# Patient Record
Sex: FEMALE | Race: WHITE | NOT HISPANIC OR LATINO | Employment: PART TIME | URBAN - METROPOLITAN AREA
[De-identification: names, ages, dates, MRNs, and addresses within clinical notes are randomized per-mention and may not be internally consistent; named-entity substitution may affect disease eponyms.]

---
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
## 2019-03-10 ENCOUNTER — HOSPITAL ENCOUNTER (EMERGENCY)
Facility: HOSPITAL | Age: 19
Discharge: HOME | End: 2019-03-10
Attending: EMERGENCY MEDICINE | Admitting: EMERGENCY MEDICINE
Payer: COMMERCIAL

## 2019-03-10 VITALS
RESPIRATION RATE: 18 BRPM | DIASTOLIC BLOOD PRESSURE: 88 MMHG | OXYGEN SATURATION: 97 % | HEART RATE: 89 BPM | TEMPERATURE: 98.4 F | BODY MASS INDEX: 22.13 KG/M2 | WEIGHT: 141 LBS | SYSTOLIC BLOOD PRESSURE: 130 MMHG | HEIGHT: 67 IN

## 2019-03-10 DIAGNOSIS — R21 RASH: Primary | ICD-10-CM

## 2019-03-10 PROCEDURE — 99282 EMERGENCY DEPT VISIT SF MDM: CPT

## 2019-03-10 RX ORDER — FAMOTIDINE 20 MG/1
20 TABLET, FILM COATED ORAL 2 TIMES DAILY
Qty: 10 TABLET | Refills: 0 | Status: SHIPPED | OUTPATIENT
Start: 2019-03-10 | End: 2019-03-10 | Stop reason: SDUPTHER

## 2019-03-10 RX ORDER — PREDNISONE 20 MG/1
60 TABLET ORAL ONCE
Status: COMPLETED | OUTPATIENT
Start: 2019-03-10 | End: 2019-03-10

## 2019-03-10 RX ORDER — PREDNISONE 20 MG/1
40 TABLET ORAL DAILY
Qty: 10 TABLET | Refills: 0 | Status: SHIPPED | OUTPATIENT
Start: 2019-03-10 | End: 2019-03-10 | Stop reason: SDUPTHER

## 2019-03-10 RX ORDER — PREDNISONE 20 MG/1
40 TABLET ORAL DAILY
Qty: 10 TABLET | Refills: 0 | Status: SHIPPED | OUTPATIENT
Start: 2019-03-10 | End: 2019-03-15

## 2019-03-10 RX ORDER — FAMOTIDINE 20 MG/1
20 TABLET, FILM COATED ORAL 2 TIMES DAILY
Qty: 10 TABLET | Refills: 0 | Status: SHIPPED | OUTPATIENT
Start: 2019-03-10 | End: 2019-12-10 | Stop reason: ALTCHOICE

## 2019-03-10 RX ORDER — FAMOTIDINE 20 MG/1
20 TABLET, FILM COATED ORAL ONCE
Status: COMPLETED | OUTPATIENT
Start: 2019-03-10 | End: 2019-03-10

## 2019-03-10 RX ADMIN — FAMOTIDINE 20 MG: 20 TABLET ORAL at 18:16

## 2019-03-10 RX ADMIN — PREDNISONE 60 MG: 20 TABLET ORAL at 18:16

## 2019-03-10 NOTE — DISCHARGE INSTRUCTIONS
Discontinue use of use new medication as an outpatient  Use Benadryl 25 mg every 6 hours as needed for itching  Return to ED if any change or worsening condition, difficulty breathing, shortness of breath or lip or tongue swelling

## 2019-03-10 NOTE — ED TRIAGE NOTES
Patient arrives with complaints of redness to face for the past 3 days with itching  Does state tongue was slightly swollen yesterday but diminished after benadryl  Did start Bactroban topically 4 days ago from lesion behind ear

## 2019-03-10 NOTE — ED PROVIDER NOTES
History  Chief Complaint   Patient presents with    Rash     30-year-old female with history of hyperlipidemia presents for evaluation of rash starting 1 day prior  Patient reports that she initiated use of Bactroban 1 day prior for skin infection and started having rash on face and neck shortly after  Patient with itching in the face at this time and reports swelling of tongue 1 day prior  Patient with no swelling, stridor or wheezing at this time but does continue to report itching  Patient took Benadryl at home with little relief  Otherwise patient no other new exposures, new foods, new detergents or perfumes  History provided by:  Patient   used: No    Rash   Associated symptoms: no abdominal pain, no diarrhea, no fever, no headaches, no myalgias, no nausea, no shortness of breath, no sore throat and not vomiting        Prior to Admission Medications   Prescriptions Last Dose Informant Patient Reported? Taking?    EPINEPHrine (EPIPEN) 0 3 mg/0 3 mL SOAJ   Yes No   Sig: Inject 0 3 mg into a muscle   fexofenadine (ALLEGRA) 180 MG tablet   Yes No   Sig: Take 180 mg by mouth   fluticasone (FLONASE) 50 mcg/act nasal spray   Yes No   Si sprays into each nostril   norgestimate-ethinyl estradiol (ORTHO TRI-CYCLEN,TRINESSA) 0 18/0 215/0 25 MG-35 MCG per tablet   No No   Sig: Take 1 tablet by mouth daily   pediatric multivitamin-iron (POLY-VI-SOL with IRON) 15 MG chewable tablet   Yes No   Sig: Chew 1 tablet      Facility-Administered Medications: None       Past Medical History:   Diagnosis Date    High cholesterol        Past Surgical History:   Procedure Laterality Date    TONSILECTOMY AND ADNOIDECTOMY      TYMPANOSTOMY TUBE PLACEMENT      WISDOM TOOTH EXTRACTION         Family History   Problem Relation Age of Onset    Fibroids Mother     GI problems Father     Seizures Sister     Arthritis Maternal Grandmother      I have reviewed and agree with the history as documented  Social History     Tobacco Use    Smoking status: Never Smoker    Smokeless tobacco: Never Used   Substance Use Topics    Alcohol use: Yes     Comment: socially     Drug use: No        Review of Systems   Constitutional: Negative for chills and fever  HENT: Negative for rhinorrhea and sore throat  Eyes: Negative for visual disturbance  Respiratory: Negative for cough and shortness of breath  Cardiovascular: Negative for chest pain and leg swelling  Gastrointestinal: Negative for abdominal pain, diarrhea, nausea and vomiting  Genitourinary: Negative for dysuria  Musculoskeletal: Negative for back pain and myalgias  Skin: Positive for rash  Neurological: Negative for dizziness and headaches  Psychiatric/Behavioral: Negative for confusion  All other systems reviewed and are negative  Physical Exam  Physical Exam   Constitutional: She is oriented to person, place, and time  She appears well-developed and well-nourished  HENT:   Nose: Nose normal    Mouth/Throat: Oropharynx is clear and moist  No oropharyngeal exudate  Eyes: Pupils are equal, round, and reactive to light  Conjunctivae and EOM are normal  No scleral icterus  Neck: Normal range of motion  Neck supple  No JVD present  No tracheal deviation present  Cardiovascular: Normal rate, regular rhythm and normal heart sounds  No murmur heard  Pulmonary/Chest: Effort normal and breath sounds normal  No respiratory distress  She has no wheezes  She has no rales  Abdominal: Soft  Bowel sounds are normal  There is no tenderness  There is no guarding  Musculoskeletal: Normal range of motion  She exhibits no edema or tenderness  Neurological: She is alert and oriented to person, place, and time  No cranial nerve deficit or sensory deficit  She exhibits normal muscle tone  5/5 motor, nl sens   Skin: Skin is warm and dry  Very mild erythematous rash on the face and neck     Psychiatric: She has a normal mood and affect  Her behavior is normal    Nursing note and vitals reviewed  Vital Signs  ED Triage Vitals [03/10/19 1800]   Temperature Pulse Respirations Blood Pressure SpO2   98 4 °F (36 9 °C) 89 18 130/88 97 %      Temp Source Heart Rate Source Patient Position - Orthostatic VS BP Location FiO2 (%)   Tympanic Monitor Sitting Left arm --      Pain Score       --           Vitals:    03/10/19 1800   BP: 130/88   Pulse: 89   Patient Position - Orthostatic VS: Sitting       Visual Acuity      ED Medications  Medications   predniSONE tablet 60 mg (60 mg Oral Given 3/10/19 1816)   famotidine (PEPCID) tablet 20 mg (20 mg Oral Given 3/10/19 1816)       Diagnostic Studies  Results Reviewed     None                 No orders to display              Procedures  Procedures       Phone Contacts  ED Phone Contact    ED Course  ED Course as of Mar 10 1819   Galo Rhodes Mar 10, 2019   1757 Patient seen and examined at bedside  Patient given dose of prednisone and Pepcid in ED  Patient took Benadryl prior to ED arrival       1810 Discussed with patient discharge plan and instructions  Patient to follow up with primary care physician as an outpatient  Patient to return to ED if any change or worsening condition: increased pain, difficulty breathing, shortness of breath, wheezing or tongue or lip swelling  MDM    Disposition  Final diagnoses:   Rash     Time reflects when diagnosis was documented in both MDM as applicable and the Disposition within this note     Time User Action Codes Description Comment    3/10/2019  6:08 PM Arturo Carpenter Add [R21] Rash       ED Disposition     ED Disposition Condition Date/Time Comment    Discharge Stable Sun Mar 10, 2019  6:08 PM Octavio Salgado discharge to home/self care              Follow-up Information     Follow up With Specialties Details Why Zeke Cisneros MD Family Medicine In 2 days As needed, If symptoms worsen [address]  [address] [facility] [address] [facility] Emergency Department Emergency Medicine  As needed, If symptoms worsen [address] [facility]  [phone]          Discharge Medication List as of 3/10/2019  6:10 PM      START taking these medications    Details   famotidine (PEPCID) 20 mg tablet Take 1 tablet (20 mg total) by mouth 2 (two) times a day, Starting Sun 3/10/2019, Normal      predniSONE 20 mg tablet Take 2 tablets (40 mg total) by mouth daily for 5 days, Starting Sun 3/10/2019, Until Fri 3/15/2019, Normal         CONTINUE these medications which have NOT CHANGED    Details   EPINEPHrine (EPIPEN) 0 3 mg/0 3 mL SOAJ Inject 0 3 mg into a muscle, Historical Med      fexofenadine (ALLEGRA) 180 MG tablet Take 180 mg by mouth, Historical Med      fluticasone (FLONASE) 50 mcg/act nasal spray 2 sprays into each nostril, Starting Tue 10/9/2018, Until Wed 10/9/2019, Historical Med      norgestimate-ethinyl estradiol (ORTHO TRI-CYCLEN,TRINESSA) 0 18/0 215/0 25 MG-35 MCG per tablet Take 1 tablet by mouth daily, Starting Wed 12/19/2018, Until Thu 12/19/2019, Normal      pediatric multivitamin-iron (POLY-VI-SOL with IRON) 15 MG chewable tablet Chew 1 tablet, Historical Med           No discharge procedures on file      ED Provider  Electronically Signed by           Tk Khan, DO  03/10/19 [address] [employer] [facility], [facility]  03/10/19 8318

## 2019-09-07 ENCOUNTER — APPOINTMENT (OUTPATIENT)
Dept: URGENT CARE | Facility: CLINIC | Age: 19
End: 2019-09-07

## 2019-09-07 DIAGNOSIS — Z02.1 PRE-EMPLOYMENT EXAMINATION: Primary | ICD-10-CM

## 2019-09-07 PROCEDURE — 86480 TB TEST CELL IMMUN MEASURE: CPT | Performed by: NURSE PRACTITIONER

## 2019-09-11 LAB
GAMMA INTERFERON BACKGROUND BLD IA-ACNC: 0.05 IU/ML
M TB IFN-G BLD-IMP: NEGATIVE
M TB IFN-G CD4+ BCKGRND COR BLD-ACNC: -0.01 IU/ML
M TB IFN-G CD4+ BCKGRND COR BLD-ACNC: 0 IU/ML
MITOGEN IGNF BCKGRD COR BLD-ACNC: >10 IU/ML

## 2019-10-20 ENCOUNTER — OFFICE VISIT (OUTPATIENT)
Dept: URGENT CARE | Facility: HOSPITAL | Age: 19
End: 2019-10-20
Payer: COMMERCIAL

## 2019-10-20 VITALS
SYSTOLIC BLOOD PRESSURE: 118 MMHG | HEIGHT: 67 IN | OXYGEN SATURATION: 95 % | TEMPERATURE: 98.6 F | HEART RATE: 89 BPM | BODY MASS INDEX: 22.08 KG/M2 | RESPIRATION RATE: 18 BRPM | WEIGHT: 140.65 LBS | DIASTOLIC BLOOD PRESSURE: 68 MMHG

## 2019-10-20 DIAGNOSIS — R11.0 NAUSEA: ICD-10-CM

## 2019-10-20 DIAGNOSIS — J02.9 SORE THROAT: Primary | ICD-10-CM

## 2019-10-20 LAB — S PYO AG THROAT QL: NEGATIVE

## 2019-10-20 PROCEDURE — 99213 OFFICE O/P EST LOW 20 MIN: CPT | Performed by: NURSE PRACTITIONER

## 2019-10-20 PROCEDURE — 87070 CULTURE OTHR SPECIMN AEROBIC: CPT | Performed by: NURSE PRACTITIONER

## 2019-10-20 PROCEDURE — 87880 STREP A ASSAY W/OPTIC: CPT | Performed by: NURSE PRACTITIONER

## 2019-10-20 RX ORDER — ONDANSETRON 4 MG/1
4 TABLET, FILM COATED ORAL EVERY 8 HOURS PRN
Qty: 20 TABLET | Refills: 0 | Status: SHIPPED | OUTPATIENT
Start: 2019-10-20 | End: 2019-12-10 | Stop reason: ALTCHOICE

## 2019-10-20 NOTE — PROGRESS NOTES
[facility]        NAME: Jack Kearns is a 22 y o  female  : 2000    MRN: [id number]  DATE: 2019  TIME: 2:40 PM    Assessment and Plan   Sore throat [J02 9]  1  Sore throat  POCT rapid strepA    Throat culture   2  Nausea  ondansetron (ZOFRAN) 4 mg tablet         Patient Instructions     Patient Instructions     Take medication as needed for nausea and vomiting  If he develops any abdominal pain prolonged vomiting or vomiting blood, fever or any concerning symptoms proceed to ER  Explained viral vs bacterial  Supportive treatment  Drink plenty of fluids, rest, saltwater gargles, lozenges, hot tea with honey  Tylenol or motrin for pain  Return precautions discussed  Acute Nausea and Vomiting   WHAT YOU NEED TO KNOW:   Acute nausea and vomiting start suddenly, worsen quickly, and last a short time  DISCHARGE INSTRUCTIONS:   Return to the emergency department if:   · You see blood in your vomit or your bowel movements  · You have sudden, severe pain in your chest and upper abdomen after hard vomiting or retching  · You have swelling in your neck and chest      · You are dizzy, cold, and thirsty and your eyes and mouth are dry  · You are urinating very little or not at all  · You have muscle weakness, leg cramps, and trouble breathing  · Your heart is beating much faster than normal      · You continue to vomit for more than 48 hours  Contact your healthcare provider if:   · You have frequent dry heaves (vomiting but nothing comes out)  · Your nausea and vomiting does not get better or go away after you use medicine  · You have questions or concerns about your condition or treatment  Medicines: You may need any of the following:  · Medicines  may be given to calm your stomach and stop your vomiting  You may also need medicines to help you feel more relaxed or to stop nausea and vomiting caused by motion sickness      · Gastrointestinal stimulants are used to help empty your stomach and bowels  This may help decrease nausea and vomiting  · Take your medicine as directed  Contact your healthcare provider if you think your medicine is not helping or if you have side effects  Tell him or her if you are allergic to any medicine  Keep a list of the medicines, vitamins, and herbs you take  Include the amounts, and when and why you take them  Bring the list or the pill bottles to follow-up visits  Carry your medicine list with you in case of an emergency  Prevent or manage acute nausea and vomiting:   · Do not drink alcohol  Alcohol may upset or irritate your stomach  Too much alcohol can also cause acute nausea and vomiting  · Control stress  Headaches due to stress may cause nausea and vomiting  Find ways to relax and manage your stress  Get more rest and sleep  · Drink more liquids as directed  Vomiting can lead to dehydration  It is important to drink more liquids to help replace lost body fluids  Ask your healthcare provider how much liquid to drink each day and which liquids are best for you  Your provider may recommend that you drink an oral rehydration solution (ORS)  ORS contains water, salts, and sugar that are needed to replace the lost body fluids  Ask what kind of ORS to use, how much to drink, and where to get it  · Eat smaller meals, more often  Eat small amounts of food every 2 to 3 hours, even if you are not hungry  Food in your stomach may decrease your nausea  · Talk to your healthcare provider before you take over-the-counter (OTC) medicines  These medicines can cause serious problems if you use certain other medicines, or you have a medical condition  You may have problems if you use too much or use them for longer than the label says  Follow directions on the label carefully  Follow up with your healthcare provider as directed:  Write down your questions so you remember to ask them during your follow-up visits    © 2017 [address] [facility] Information is for End User's use only and may not be sold, redistributed or otherwise used for commercial purposes  All illustrations and images included in CareNotes® are the copyrighted property of [employer]  or [employer]  The above information is an  only  It is not intended as medical advice for individual conditions or treatments  Talk to your doctor, nurse or pharmacist before following any medical regimen to see if it is safe and effective for you  Pharyngitis   WHAT YOU NEED TO KNOW:   Pharyngitis, or sore throat, is inflammation of the tissues and structures in your pharynx (throat)  Pharyngitis is most often caused by bacteria  It may also be caused by a cold or flu virus  Other causes include smoking, allergies, or acid reflux  DISCHARGE INSTRUCTIONS:   Call 911 for any of the following:   · You have trouble breathing or swallowing because your throat is swollen or sore  Return to the emergency department if:   · You are drooling because it hurts too much to swallow  · Your fever is higher than 102? F (39?C) or lasts longer than 3 days  · You are confused  · You taste blood in your throat  Contact your healthcare provider if:   · Your throat pain gets worse  · You have a painful lump in your throat that does not go away after 5 days  · Your symptoms do not improve after 5 days  · You have questions or concerns about your condition or care  Medicines:  Viral pharyngitis will go away on its own without treatment  Your sore throat should start to feel better in 3 to 5 days for both viral and bacterial infections  You may need any of the following:  · Antibiotics  treat a bacterial infection  · NSAIDs , such as ibuprofen, help decrease swelling, pain, and fever  NSAIDs can cause stomach bleeding or kidney problems in certain people   If you take blood thinner medicine, always ask your healthcare provider if NSAIDs are safe for you  Always read the medicine label and follow directions  · Acetaminophen  decreases pain and fever  It is available without a doctor's order  Ask how much to take and how often to take it  Follow directions  Acetaminophen can cause liver damage if not taken correctly  · Take your medicine as directed  Contact your healthcare provider if you think your medicine is not helping or if you have side effects  Tell him or her if you are allergic to any medicine  Keep a list of the medicines, vitamins, and herbs you take  Include the amounts, and when and why you take them  Bring the list or the pill bottles to follow-up visits  Carry your medicine list with you in case of an emergency  Manage your symptoms:   · Gargle salt water  Mix ¼ teaspoon salt in an 8 ounce glass of warm water and gargle  This may help decrease swelling in your throat  · Drink liquids as directed  You may need to drink more liquids than usual  Liquids may help soothe your throat and prevent dehydration  Ask how much liquid to drink each day and which liquids are best for you  · Use a cool-steam humidifier  to help moisten the air in your room and calm your cough  · Soothe your throat  with cough drops, ice, soft foods, or popsicles  Prevent the spread of pharyngitis:  Cover your mouth and nose when you cough or sneeze  Do not share food or drinks  Wash your hands often  Use soap and water  If soap and water are unavailable, use an alcohol based hand   Follow up with your healthcare provider as directed:  Write down your questions so you remember to ask them during your visits  © 2017 [facility] Information is for End User's use only and may not be sold, redistributed or otherwise used for commercial purposes  All illustrations and images included in CareNotes® are the copyrighted property of [employer] , [facility]  or [employer]  The above information is an  only   It is not intended as medical advice for individual conditions or treatments  Talk to your doctor, nurse or pharmacist before following any medical regimen to see if it is safe and effective for you  Follow up with PCP in 3-5 days  Proceed to  ER if symptoms worsen  Chief Complaint     Chief Complaint   Patient presents with    Vomiting     started today     Diarrhea    Sore Throat    Earache         History of Present Illness       Patient is a 67-year-old female presents with a couple hour history of sore throat, nausea, vomiting and diarrhea  Patient states that she called out of work today and needs a work note  Patient denies any difficulty swallowing, breathing or maintaining secretions  Patient denies any abdominal pain  Notes a few episodes of vomiting and diarrhea  Denies any blood in vomit or stool  Denies any urinary complaints  Patient denies any fever, chills, or body aches  Denies any earache nasal congestion cough, chest pain, shortness of breath or palpitations  Patient did not try any over-the-counter medication prior to arrival   Patient denies any recent sick contacts  Denies eating any undercooked food yesterday  Review of Systems   Review of Systems   Constitutional: Negative for chills, diaphoresis, fatigue and fever  HENT: Positive for sore throat  Negative for congestion, ear pain, facial swelling, mouth sores, postnasal drip, rhinorrhea, sinus pressure, sinus pain and trouble swallowing  Eyes: Negative for photophobia and visual disturbance  Respiratory: Negative for cough, chest tightness and shortness of breath  Cardiovascular: Negative for chest pain and palpitations  Gastrointestinal: Positive for diarrhea, nausea and vomiting  Negative for abdominal pain  Genitourinary: Negative  Musculoskeletal: Negative for arthralgias, back pain, joint swelling, myalgias, neck pain and neck stiffness  Skin: Negative for rash     Neurological: Negative for dizziness, facial asymmetry, weakness, light-headedness, numbness and headaches  Current Medications       Current Outpatient Medications:     EPINEPHrine (EPIPEN) 0 3 mg/0 3 mL SOAJ, Inject 0 3 mg into a muscle, Disp: , Rfl:     famotidine (PEPCID) 20 mg tablet, Take 1 tablet (20 mg total) by mouth 2 (two) times a day, Disp: 10 tablet, Rfl: 0    fexofenadine (ALLEGRA) 180 MG tablet, Take 180 mg by mouth, Disp: , Rfl:     fluticasone (FLONASE) 50 mcg/act nasal spray, 2 sprays into each nostril, Disp: , Rfl:     norgestimate-ethinyl estradiol (ORTHO TRI-CYCLEN,TRINESSA) 0 18/0 215/0 25 MG-35 MCG per tablet, Take 1 tablet by mouth daily, Disp: 90 tablet, Rfl: 3    pediatric multivitamin-iron (POLY-VI-SOL with IRON) 15 MG chewable tablet, Chew 1 tablet, Disp: , Rfl:     ondansetron (ZOFRAN) 4 mg tablet, Take 1 tablet (4 mg total) by mouth every 8 (eight) hours as needed for nausea or vomiting, Disp: 20 tablet, Rfl: 0    Current Allergies     Allergies as of 10/20/2019 - Reviewed 10/20/2019   Allergen Reaction Noted    Mupirocin  03/18/2019    Shellfish-derived products Rash 03/10/2019    Sulfa antibiotics Rash 12/19/2018            The following portions of the patient's history were reviewed and updated as appropriate: allergies, current medications, past family history, past medical history, past social history, past surgical history and problem list      Past Medical History:   Diagnosis Date    High cholesterol        Past Surgical History:   Procedure Laterality Date    TONSILECTOMY AND ADNOIDECTOMY      TYMPANOSTOMY TUBE PLACEMENT      WISDOM TOOTH EXTRACTION         Family History   Problem Relation Age of Onset    Fibroids Mother     GI problems Father     Seizures Sister     Arthritis Maternal Grandmother          Medications have been verified          Objective   /68   Pulse 89   Temp 98 6 °F (37 °C) (Oral)   Resp 18   Ht 5' 7" (1 702 m)   Wt 63 8 kg (140 lb 10 5 oz) LMP 10/18/2019 (Exact Date)   SpO2 95%   BMI 22 03 kg/m²        Physical Exam     Physical Exam   Constitutional: She is oriented to person, place, and time  She appears well-developed and well-nourished  No distress  HENT:   Head: Normocephalic and atraumatic  Right Ear: Hearing, tympanic membrane, external ear and ear canal normal    Left Ear: Hearing, tympanic membrane, external ear and ear canal normal    Nose: Nose normal  Right sinus exhibits no maxillary sinus tenderness and no frontal sinus tenderness  Left sinus exhibits no maxillary sinus tenderness and no frontal sinus tenderness  Mouth/Throat: Uvula is midline and mucous membranes are normal  Posterior oropharyngeal erythema present  No oropharyngeal exudate, posterior oropharyngeal edema or tonsillar abscesses  Tonsils are 0 on the right  Tonsils are 0 on the left  Eyes: Pupils are equal, round, and reactive to light  Conjunctivae are normal    Cardiovascular: Regular rhythm, S1 normal, S2 normal, normal heart sounds, intact distal pulses and normal pulses  Pulmonary/Chest: Effort normal and breath sounds normal    Abdominal: Soft  Normal appearance and bowel sounds are normal  She exhibits no distension, no ascites and no mass  There is no tenderness  There is no rigidity, no rebound, no guarding, no CVA tenderness, no tenderness at McBurney's point and negative Kellogg's sign  Lymphadenopathy:     She has no cervical adenopathy  Neurological: She is alert and oriented to person, place, and time  Skin: Skin is warm and dry  Capillary refill takes less than 2 seconds

## 2019-10-20 NOTE — PATIENT INSTRUCTIONS
Take medication as needed for nausea and vomiting  If he develops any abdominal pain prolonged vomiting or vomiting blood, fever or any concerning symptoms proceed to ER  Explained viral vs bacterial  Supportive treatment  Drink plenty of fluids, rest, saltwater gargles, lozenges, hot tea with honey  Tylenol or motrin for pain  Return precautions discussed  Acute Nausea and Vomiting   WHAT YOU NEED TO KNOW:   Acute nausea and vomiting start suddenly, worsen quickly, and last a short time  DISCHARGE INSTRUCTIONS:   Return to the emergency department if:   · You see blood in your vomit or your bowel movements  · You have sudden, severe pain in your chest and upper abdomen after hard vomiting or retching  · You have swelling in your neck and chest      · You are dizzy, cold, and thirsty and your eyes and mouth are dry  · You are urinating very little or not at all  · You have muscle weakness, leg cramps, and trouble breathing  · Your heart is beating much faster than normal      · You continue to vomit for more than 48 hours  Contact your healthcare provider if:   · You have frequent dry heaves (vomiting but nothing comes out)  · Your nausea and vomiting does not get better or go away after you use medicine  · You have questions or concerns about your condition or treatment  Medicines: You may need any of the following:  · Medicines  may be given to calm your stomach and stop your vomiting  You may also need medicines to help you feel more relaxed or to stop nausea and vomiting caused by motion sickness  · Gastrointestinal stimulants  are used to help empty your stomach and bowels  This may help decrease nausea and vomiting  · Take your medicine as directed  Contact your healthcare provider if you think your medicine is not helping or if you have side effects  Tell him or her if you are allergic to any medicine  Keep a list of the medicines, vitamins, and herbs you take  Include the amounts, and when and why you take them  Bring the list or the pill bottles to follow-up visits  Carry your medicine list with you in case of an emergency  Prevent or manage acute nausea and vomiting:   · Do not drink alcohol  Alcohol may upset or irritate your stomach  Too much alcohol can also cause acute nausea and vomiting  · Control stress  Headaches due to stress may cause nausea and vomiting  Find ways to relax and manage your stress  Get more rest and sleep  · Drink more liquids as directed  Vomiting can lead to dehydration  It is important to drink more liquids to help replace lost body fluids  Ask your healthcare provider how much liquid to drink each day and which liquids are best for you  Your provider may recommend that you drink an oral rehydration solution (ORS)  ORS contains water, salts, and sugar that are needed to replace the lost body fluids  Ask what kind of ORS to use, how much to drink, and where to get it  · Eat smaller meals, more often  Eat small amounts of food every 2 to 3 hours, even if you are not hungry  Food in your stomach may decrease your nausea  · Talk to your healthcare provider before you take over-the-counter (OTC) medicines  These medicines can cause serious problems if you use certain other medicines, or you have a medical condition  You may have problems if you use too much or use them for longer than the label says  Follow directions on the label carefully  Follow up with your healthcare provider as directed:  Write down your questions so you remember to ask them during your follow-up visits  © 2017 [address] [employer] Information is for End User's use only and may not be sold, redistributed or otherwise used for commercial purposes  All illustrations and images included in CareNotes® are the copyrighted property of [employer]  or [employer]  The above information is an  only   It is not intended as medical advice for individual conditions or treatments  Talk to your doctor, nurse or pharmacist before following any medical regimen to see if it is safe and effective for you  Pharyngitis   WHAT YOU NEED TO KNOW:   Pharyngitis, or sore throat, is inflammation of the tissues and structures in your pharynx (throat)  Pharyngitis is most often caused by bacteria  It may also be caused by a cold or flu virus  Other causes include smoking, allergies, or acid reflux  DISCHARGE INSTRUCTIONS:   Call 911 for any of the following:   · You have trouble breathing or swallowing because your throat is swollen or sore  Return to the emergency department if:   · You are drooling because it hurts too much to swallow  · Your fever is higher than 102? F (39?C) or lasts longer than 3 days  · You are confused  · You taste blood in your throat  Contact your healthcare provider if:   · Your throat pain gets worse  · You have a painful lump in your throat that does not go away after 5 days  · Your symptoms do not improve after 5 days  · You have questions or concerns about your condition or care  Medicines:  Viral pharyngitis will go away on its own without treatment  Your sore throat should start to feel better in 3 to 5 days for both viral and bacterial infections  You may need any of the following:  · Antibiotics  treat a bacterial infection  · NSAIDs , such as ibuprofen, help decrease swelling, pain, and fever  NSAIDs can cause stomach bleeding or kidney problems in certain people  If you take blood thinner medicine, always ask your healthcare provider if NSAIDs are safe for you  Always read the medicine label and follow directions  · Acetaminophen  decreases pain and fever  It is available without a doctor's order  Ask how much to take and how often to take it  Follow directions  Acetaminophen can cause liver damage if not taken correctly  · Take your medicine as directed    Contact your healthcare provider if you think your medicine is not helping or if you have side effects  Tell him or her if you are allergic to any medicine  Keep a list of the medicines, vitamins, and herbs you take  Include the amounts, and when and why you take them  Bring the list or the pill bottles to follow-up visits  Carry your medicine list with you in case of an emergency  Manage your symptoms:   · Gargle salt water  Mix ¼ teaspoon salt in an 8 ounce glass of warm water and gargle  This may help decrease swelling in your throat  · Drink liquids as directed  You may need to drink more liquids than usual  Liquids may help soothe your throat and prevent dehydration  Ask how much liquid to drink each day and which liquids are best for you  · Use a cool-steam humidifier  to help moisten the air in your room and calm your cough  · Soothe your throat  with cough drops, ice, soft foods, or popsicles  Prevent the spread of pharyngitis:  Cover your mouth and nose when you cough or sneeze  Do not share food or drinks  Wash your hands often  Use soap and water  If soap and water are unavailable, use an alcohol based hand   Follow up with your healthcare provider as directed:  Write down your questions so you remember to ask them during your visits  © 2017 [address] [employer] Information is for End User's use only and may not be sold, redistributed or otherwise used for commercial purposes  All illustrations and images included in CareNotes® are the copyrighted property of [employer]  or [employer]  The above information is an  only  It is not intended as medical advice for individual conditions or treatments  Talk to your doctor, nurse or pharmacist before following any medical regimen to see if it is safe and effective for you

## 2019-10-20 NOTE — LETTER
October 20, 2019     Patient: Mrieya Morrison   YOB: 2000   Date of Visit: 10/20/2019       To Whom it May Concern:    Keyla Pina was seen in my clinic on 10/20/2019  She may return to work on 10/21/2019  If you have any questions or concerns, please don't hesitate to call           Sincerely,          PABLITO Collins        CC: No Recipients

## 2019-10-22 LAB — BACTERIA THROAT CULT: NORMAL

## 2019-12-10 ENCOUNTER — ANNUAL EXAM (OUTPATIENT)
Dept: OBGYN CLINIC | Facility: CLINIC | Age: 19
End: 2019-12-10
Payer: COMMERCIAL

## 2019-12-10 VITALS
WEIGHT: 146 LBS | BODY MASS INDEX: 23.46 KG/M2 | DIASTOLIC BLOOD PRESSURE: 60 MMHG | HEIGHT: 66 IN | SYSTOLIC BLOOD PRESSURE: 120 MMHG

## 2019-12-10 DIAGNOSIS — Z01.419 ENCOUNTER FOR GYNECOLOGICAL EXAMINATION WITHOUT ABNORMAL FINDING: ICD-10-CM

## 2019-12-10 DIAGNOSIS — Z01.419 ENCNTR FOR GYN EXAM (GENERAL) (ROUTINE) W/O ABN FINDINGS: Primary | ICD-10-CM

## 2019-12-10 PROCEDURE — S0612 ANNUAL GYNECOLOGICAL EXAMINA: HCPCS | Performed by: PHYSICIAN ASSISTANT

## 2019-12-10 RX ORDER — NORGESTIMATE AND ETHINYL ESTRADIOL 7DAYSX3 28
1 KIT ORAL DAILY
Qty: 90 TABLET | Refills: 3 | Status: SHIPPED | OUTPATIENT
Start: 2019-12-10 | End: 2020-09-29 | Stop reason: SDUPTHER

## 2019-12-10 NOTE — PROGRESS NOTES
Lizet Caceres  2000      CC:  Yearly exam    S:  20 y o  female here for yearly exam  She has no complaints  Her cycles are regular, not heavy or crampy  Sexual activity: She is not currently sexually active and uses TriSprintec for contraception  STD testing: She does not want STD testing today  Gardasil: She has completed the Gardasil series  We reviewed ASCCP guidelines for Pap testing today       Current Outpatient Medications:     EPINEPHrine (EPIPEN) 0 3 mg/0 3 mL SOAJ, Inject 0 3 mg into a muscle, Disp: , Rfl:     fexofenadine (ALLEGRA) 180 MG tablet, Take 180 mg by mouth, Disp: , Rfl:     norgestimate-ethinyl estradiol (ORTHO TRI-CYCLEN,TRINESSA) 0 18/0 215/0 25 MG-35 MCG per tablet, Take 1 tablet by mouth daily, Disp: 90 tablet, Rfl: 3    pediatric multivitamin-iron (POLY-VI-SOL with IRON) 15 MG chewable tablet, Chew 1 tablet, Disp: , Rfl:     fluticasone (FLONASE) 50 mcg/act nasal spray, 2 sprays into each nostril, Disp: , Rfl:   Social History     Socioeconomic History    Marital status: Single     Spouse name: Not on file    Number of children: Not on file    Years of education: Not on file    Highest education level: Not on file   Occupational History    Not on file   Social Needs    Financial resource strain: Not on file    Food insecurity:     Worry: Not on file     Inability: Not on file    Transportation needs:     Medical: Not on file     Non-medical: Not on file   Tobacco Use    Smoking status: Never Smoker    Smokeless tobacco: Never Used   Substance and Sexual Activity    Alcohol use: Not Currently     Comment: socially     Drug use: No    Sexual activity: Yes     Partners: Male     Birth control/protection: OCP   Lifestyle    Physical activity:     Days per week: Not on file     Minutes per session: Not on file    Stress: Not on file   Relationships    Social connections:     Talks on phone: Not on file     Gets together: Not on file     Attends Yarsani service: Not on file     Active member of club or organization: Not on file     Attends meetings of clubs or organizations: Not on file     Relationship status: Not on file    Intimate partner violence:     Fear of current or ex partner: Not on file     Emotionally abused: Not on file     Physically abused: Not on file     Forced sexual activity: Not on file   Other Topics Concern    Not on file   Social History Narrative    Not on file     Family History   Problem Relation Age of Onset    Fibroids Mother     GI problems Father     Seizures Sister     Arthritis Maternal Grandmother      Past Medical History:   Diagnosis Date    High cholesterol        Review of Systems   Respiratory: Negative  Cardiovascular: Negative  Gastrointestinal: Negative for constipation and diarrhea  Genitourinary: Negative for difficulty urinating, pelvic pain, vaginal bleeding, vaginal discharge, itching or odor  O:   Blood pressure 120/60, height 5' 6 14" (1 68 m), weight 66 2 kg (146 lb), last menstrual period 11/27/2019  Patient appears well and is not in distress  Neck is supple without masses  Breasts are symmetrical without mass, tenderness, nipple discharge, skin changes or adenopathy  Abdomen is soft and nontender without masses  A:  Yearly exam      P:   Rx OrthoTriCyclen sent to pharmacy  Consistent condom use recommended if sexually active  She will return in one year for her yearly exam or sooner as needed

## 2020-05-13 ENCOUNTER — HOSPITAL ENCOUNTER (EMERGENCY)
Facility: HOSPITAL | Age: 20
Discharge: HOME | End: 2020-05-13
Attending: EMERGENCY MEDICINE
Payer: COMMERCIAL

## 2020-05-13 ENCOUNTER — APPOINTMENT (EMERGENCY)
Dept: RADIOLOGY | Facility: HOSPITAL | Age: 20
End: 2020-05-13
Payer: COMMERCIAL

## 2020-05-13 VITALS
DIASTOLIC BLOOD PRESSURE: 77 MMHG | SYSTOLIC BLOOD PRESSURE: 137 MMHG | RESPIRATION RATE: 20 BRPM | OXYGEN SATURATION: 96 % | TEMPERATURE: 97.2 F | BODY MASS INDEX: 21.19 KG/M2 | HEART RATE: 107 BPM | WEIGHT: 135 LBS | HEIGHT: 67 IN

## 2020-05-13 DIAGNOSIS — S63.501A WRIST SPRAIN, RIGHT, INITIAL ENCOUNTER: ICD-10-CM
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
## 2020-05-13 DIAGNOSIS — S30.0XXA CONTUSION OF COCCYX, INITIAL ENCOUNTER: ICD-10-CM

## 2020-05-13 PROCEDURE — 73110 X-RAY EXAM OF WRIST: CPT

## 2020-05-13 PROCEDURE — 99282 EMERGENCY DEPT VISIT SF MDM: CPT | Performed by: EMERGENCY MEDICINE

## 2020-05-13 PROCEDURE — 99283 EMERGENCY DEPT VISIT LOW MDM: CPT

## 2020-05-13 PROCEDURE — 72220 X-RAY EXAM SACRUM TAILBONE: CPT

## 2020-05-13 RX ORDER — ACETAMINOPHEN 325 MG/1
650 TABLET ORAL ONCE
Status: COMPLETED | OUTPATIENT
Start: 2020-05-13 | End: 2020-05-13

## 2020-05-13 RX ADMIN — ACETAMINOPHEN 650 MG: 325 TABLET ORAL at 21:38

## 2020-09-29 DIAGNOSIS — Z01.419 ENCOUNTER FOR GYNECOLOGICAL EXAMINATION WITHOUT ABNORMAL FINDING: ICD-10-CM

## 2020-09-29 RX ORDER — NORGESTIMATE AND ETHINYL ESTRADIOL 7DAYSX3 28
1 KIT ORAL DAILY
Qty: 90 TABLET | Refills: 0 | Status: SHIPPED | OUTPATIENT
Start: 2020-09-29 | End: 2020-12-30 | Stop reason: SDUPTHER

## 2020-11-15 ENCOUNTER — TRANSCRIBE ORDERS (OUTPATIENT)
Dept: URGENT CARE | Facility: CLINIC | Age: 20
End: 2020-11-15

## 2020-11-15 ENCOUNTER — APPOINTMENT (OUTPATIENT)
Dept: RADIOLOGY | Facility: CLINIC | Age: 20
End: 2020-11-15
Payer: COMMERCIAL

## 2020-11-15 ENCOUNTER — OFFICE VISIT (OUTPATIENT)
Dept: URGENT CARE | Facility: CLINIC | Age: 20
End: 2020-11-15
Payer: COMMERCIAL

## 2020-11-15 VITALS
RESPIRATION RATE: 18 BRPM | TEMPERATURE: 98 F | HEART RATE: 84 BPM | OXYGEN SATURATION: 97 % | SYSTOLIC BLOOD PRESSURE: 121 MMHG | DIASTOLIC BLOOD PRESSURE: 70 MMHG

## 2020-11-15 DIAGNOSIS — R07.9 CHEST PAIN, UNSPECIFIED TYPE: ICD-10-CM

## 2020-11-15 DIAGNOSIS — R07.9 CHEST PAIN, UNSPECIFIED TYPE: Primary | ICD-10-CM

## 2020-11-15 DIAGNOSIS — R07.89 CHEST PAIN, MUSCULOSKELETAL: Primary | ICD-10-CM

## 2020-11-15 PROCEDURE — 99212 OFFICE O/P EST SF 10 MIN: CPT | Performed by: PHYSICIAN ASSISTANT

## 2020-11-15 PROCEDURE — 71046 X-RAY EXAM CHEST 2 VIEWS: CPT

## 2020-11-16 PROCEDURE — U0003 INFECTIOUS AGENT DETECTION BY NUCLEIC ACID (DNA OR RNA); SEVERE ACUTE RESPIRATORY SYNDROME CORONAVIRUS 2 (SARS-COV-2) (CORONAVIRUS DISEASE [COVID-19]), AMPLIFIED PROBE TECHNIQUE, MAKING USE OF HIGH THROUGHPUT TECHNOLOGIES AS DESCRIBED BY CMS-2020-01-R: HCPCS | Performed by: INTERNAL MEDICINE

## 2020-11-17 ENCOUNTER — LAB REQUISITION (OUTPATIENT)
Dept: LAB | Facility: HOSPITAL | Age: 20
End: 2020-11-17
Payer: COMMERCIAL

## 2020-11-17 DIAGNOSIS — Z03.818 ENCOUNTER FOR OBSERVATION FOR SUSPECTED EXPOSURE TO OTHER BIOLOGICAL AGENTS RULED OUT: ICD-10-CM

## 2020-11-17 DIAGNOSIS — Z11.59 ENCOUNTER FOR SCREENING FOR OTHER VIRAL DISEASES: ICD-10-CM

## 2020-11-19 LAB — SARS-COV-2 RNA SPEC QL NAA+PROBE: NOT DETECTED

## 2020-11-30 ENCOUNTER — OFFICE VISIT (OUTPATIENT)
Dept: GASTROENTEROLOGY | Facility: MEDICAL CENTER | Age: 20
End: 2020-11-30
Payer: COMMERCIAL

## 2020-11-30 VITALS
TEMPERATURE: 48.6 F | WEIGHT: 128 LBS | BODY MASS INDEX: 20.09 KG/M2 | SYSTOLIC BLOOD PRESSURE: 115 MMHG | HEART RATE: 90 BPM | HEIGHT: 67 IN | DIASTOLIC BLOOD PRESSURE: 76 MMHG

## 2020-11-30 DIAGNOSIS — R07.89 ATYPICAL CHEST PAIN: Primary | ICD-10-CM

## 2020-11-30 PROCEDURE — 99203 OFFICE O/P NEW LOW 30 MIN: CPT | Performed by: INTERNAL MEDICINE

## 2020-11-30 RX ORDER — PREDNISONE 10 MG/1
TABLET ORAL
COMMUNITY
Start: 2020-11-18 | End: 2020-12-30 | Stop reason: ALTCHOICE

## 2020-11-30 RX ORDER — FAMOTIDINE 20 MG/1
20 TABLET, FILM COATED ORAL 2 TIMES DAILY
Qty: 60 TABLET | Refills: 3 | Status: SHIPPED | OUTPATIENT
Start: 2020-11-30 | End: 2020-12-30 | Stop reason: ALTCHOICE

## 2020-12-11 ENCOUNTER — LAB (OUTPATIENT)
Dept: LAB | Facility: CLINIC | Age: 20
End: 2020-12-11
Payer: COMMERCIAL

## 2020-12-11 DIAGNOSIS — R07.89 ATYPICAL CHEST PAIN: ICD-10-CM

## 2020-12-11 PROCEDURE — 87338 HPYLORI STOOL AG IA: CPT

## 2020-12-12 LAB — H PYLORI AG STL QL IA: NEGATIVE

## 2020-12-14 ENCOUNTER — TRANSCRIBE ORDERS (OUTPATIENT)
Dept: ADMINISTRATIVE | Facility: HOSPITAL | Age: 20
End: 2020-12-14

## 2020-12-16 ENCOUNTER — TELEPHONE (OUTPATIENT)
Dept: GASTROENTEROLOGY | Facility: MEDICAL CENTER | Age: 20
End: 2020-12-16

## 2020-12-22 ENCOUNTER — LAB REQUISITION (OUTPATIENT)
Dept: LAB | Facility: HOSPITAL | Age: 20
End: 2020-12-22
Payer: COMMERCIAL

## 2020-12-22 DIAGNOSIS — Z11.59 ENCOUNTER FOR SCREENING FOR OTHER VIRAL DISEASES: ICD-10-CM

## 2020-12-22 DIAGNOSIS — Z03.818 ENCOUNTER FOR OBSERVATION FOR SUSPECTED EXPOSURE TO OTHER BIOLOGICAL AGENTS RULED OUT: ICD-10-CM

## 2020-12-22 PROCEDURE — U0003 INFECTIOUS AGENT DETECTION BY NUCLEIC ACID (DNA OR RNA); SEVERE ACUTE RESPIRATORY SYNDROME CORONAVIRUS 2 (SARS-COV-2) (CORONAVIRUS DISEASE [COVID-19]), AMPLIFIED PROBE TECHNIQUE, MAKING USE OF HIGH THROUGHPUT TECHNOLOGIES AS DESCRIBED BY CMS-2020-01-R: HCPCS | Performed by: INTERNAL MEDICINE

## 2020-12-23 LAB — SARS-COV-2 RNA SPEC QL NAA+PROBE: NOT DETECTED

## 2020-12-24 ENCOUNTER — LAB REQUISITION (OUTPATIENT)
Dept: LAB | Facility: HOSPITAL | Age: 20
End: 2020-12-24
Payer: COMMERCIAL

## 2020-12-24 DIAGNOSIS — Z03.818 ENCOUNTER FOR OBSERVATION FOR SUSPECTED EXPOSURE TO OTHER BIOLOGICAL AGENTS RULED OUT: ICD-10-CM

## 2020-12-24 DIAGNOSIS — Z11.59 ENCOUNTER FOR SCREENING FOR OTHER VIRAL DISEASES: ICD-10-CM

## 2020-12-24 PROCEDURE — U0003 INFECTIOUS AGENT DETECTION BY NUCLEIC ACID (DNA OR RNA); SEVERE ACUTE RESPIRATORY SYNDROME CORONAVIRUS 2 (SARS-COV-2) (CORONAVIRUS DISEASE [COVID-19]), AMPLIFIED PROBE TECHNIQUE, MAKING USE OF HIGH THROUGHPUT TECHNOLOGIES AS DESCRIBED BY CMS-2020-01-R: HCPCS | Performed by: INTERNAL MEDICINE

## 2020-12-25 LAB — SARS-COV-2 RNA SPEC QL NAA+PROBE: NOT DETECTED

## 2020-12-30 ENCOUNTER — ANNUAL EXAM (OUTPATIENT)
Dept: OBGYN CLINIC | Facility: CLINIC | Age: 20
End: 2020-12-30
Payer: COMMERCIAL

## 2020-12-30 VITALS
WEIGHT: 128 LBS | SYSTOLIC BLOOD PRESSURE: 132 MMHG | BODY MASS INDEX: 20.57 KG/M2 | DIASTOLIC BLOOD PRESSURE: 70 MMHG | HEIGHT: 66 IN

## 2020-12-30 DIAGNOSIS — Z01.419 ENCOUNTER FOR GYNECOLOGICAL EXAMINATION WITHOUT ABNORMAL FINDING: ICD-10-CM

## 2020-12-30 DIAGNOSIS — Z01.419 ENCNTR FOR GYN EXAM (GENERAL) (ROUTINE) W/O ABN FINDINGS: Primary | ICD-10-CM

## 2020-12-30 PROCEDURE — G0145 SCR C/V CYTO,THINLAYER,RESCR: HCPCS | Performed by: PHYSICIAN ASSISTANT

## 2020-12-30 PROCEDURE — S0612 ANNUAL GYNECOLOGICAL EXAMINA: HCPCS | Performed by: PHYSICIAN ASSISTANT

## 2020-12-30 RX ORDER — NORGESTIMATE AND ETHINYL ESTRADIOL 7DAYSX3 28
1 KIT ORAL DAILY
Qty: 90 TABLET | Refills: 3 | Status: SHIPPED | OUTPATIENT
Start: 2020-12-30 | End: 2021-10-22 | Stop reason: SDUPTHER

## 2020-12-31 ENCOUNTER — LAB REQUISITION (OUTPATIENT)
Dept: LAB | Facility: HOSPITAL | Age: 20
End: 2020-12-31
Payer: COMMERCIAL

## 2020-12-31 ENCOUNTER — TELEPHONE (OUTPATIENT)
Dept: OBGYN CLINIC | Facility: CLINIC | Age: 20
End: 2020-12-31

## 2020-12-31 DIAGNOSIS — Z11.59 ENCOUNTER FOR SCREENING FOR OTHER VIRAL DISEASES: ICD-10-CM

## 2020-12-31 DIAGNOSIS — Z03.818 ENCOUNTER FOR OBSERVATION FOR SUSPECTED EXPOSURE TO OTHER BIOLOGICAL AGENTS RULED OUT: ICD-10-CM

## 2020-12-31 PROCEDURE — U0003 INFECTIOUS AGENT DETECTION BY NUCLEIC ACID (DNA OR RNA); SEVERE ACUTE RESPIRATORY SYNDROME CORONAVIRUS 2 (SARS-COV-2) (CORONAVIRUS DISEASE [COVID-19]), AMPLIFIED PROBE TECHNIQUE, MAKING USE OF HIGH THROUGHPUT TECHNOLOGIES AS DESCRIBED BY CMS-2020-01-R: HCPCS | Performed by: INTERNAL MEDICINE

## 2020-12-31 NOTE — TELEPHONE ENCOUNTER
Patient saw [facility] yesterday at [facility]  Brandon Rai was supposed to send in refill for birth control (which I do see was sent)  Patients mom said she called [facility] and they do not have the script  Patients mom Is calling on the patients behalf because she is at work and their new deductible starts tomorrow- want to get it picked up today   Mother- Stephanie Mckeon [phone]

## 2021-01-01 LAB — SARS-COV-2 RNA SPEC QL NAA+PROBE: NOT DETECTED

## 2021-01-04 LAB
LAB AP GYN PRIMARY INTERPRETATION: NORMAL
Lab: NORMAL

## 2021-06-12 ENCOUNTER — OFFICE VISIT (OUTPATIENT)
Dept: URGENT CARE | Facility: CLINIC | Age: 21
End: 2021-06-12
Payer: COMMERCIAL

## 2021-06-12 VITALS
OXYGEN SATURATION: 97 % | WEIGHT: 125 LBS | TEMPERATURE: 97.8 F | RESPIRATION RATE: 18 BRPM | HEIGHT: 67 IN | HEART RATE: 94 BPM | BODY MASS INDEX: 19.62 KG/M2

## 2021-06-12 DIAGNOSIS — J20.8 VIRAL BRONCHITIS: Primary | ICD-10-CM

## 2021-06-12 PROCEDURE — 99213 OFFICE O/P EST LOW 20 MIN: CPT | Performed by: PHYSICIAN ASSISTANT

## 2021-06-12 RX ORDER — BROMPHENIRAMINE MALEATE, PSEUDOEPHEDRINE HYDROCHLORIDE, AND DEXTROMETHORPHAN HYDROBROMIDE 2; 30; 10 MG/5ML; MG/5ML; MG/5ML
10 SYRUP ORAL 3 TIMES DAILY PRN
Qty: 120 ML | Refills: 0 | Status: SHIPPED | OUTPATIENT
Start: 2021-06-12 | End: 2022-04-29 | Stop reason: ALTCHOICE

## 2021-06-12 RX ORDER — ALBUTEROL SULFATE 90 UG/1
2 AEROSOL, METERED RESPIRATORY (INHALATION) EVERY 6 HOURS PRN
Qty: 8.5 G | Refills: 0 | Status: SHIPPED | OUTPATIENT
Start: 2021-06-12 | End: 2022-04-29 | Stop reason: ALTCHOICE

## 2021-06-12 NOTE — LETTER
June 12, 2021     Patient: Delmer Hernández   YOB: 2000   Date of Visit: 6/12/2021       To Whom It May Concern: It is my medical opinion that Trina Rolle should remain out of work until 6/15/2021  If you have any questions or concerns, please don't hesitate to call           Sincerely,        Violet Castrejon PA-C    CC: Delmer Edgar

## 2021-06-12 NOTE — PROGRESS NOTES
[facility]        NAME: Kana Lopez is a 23 y o  female  : 2000    MRN: [id number]  DATE: 2021  TIME: 5:45 PM    Assessment and Plan   Viral bronchitis [J20 8]  1  Viral bronchitis  albuterol (ProAir HFA) 90 mcg/act inhaler    brompheniramine-pseudoephedrine-DM 30-2-10 MG/5ML syrup       Declined COVID test      Patient Instructions     Take Bromfed DM and albuterol inhaler as prescribed  Take over the counter Mucinex during the day  Fluids and rest (Warm water with honey and lemon)  Tylenol/Ibuprofen for pain fever  Follow up with PCP in 3-5 days  Proceed to  ER if symptoms worsen  Chief Complaint     Chief Complaint   Patient presents with    Cough     chest congeston, started thursday     Sore Throat         History of Present Illness       Patient had received both COVID vaccines  URI   This is a new problem  The current episode started in the past 7 days  There has been no fever  Associated symptoms include coughing and a sore throat (resolved)  Pertinent negatives include no abdominal pain, congestion, diarrhea, ear pain, headaches, nausea, rash, rhinorrhea, sinus pain, sneezing, vomiting or wheezing  Treatments tried: advil cold and sinus  Review of Systems   Review of Systems   Constitutional: Positive for fatigue  Negative for activity change, appetite change, chills and fever  HENT: Positive for sore throat (resolved)  Negative for congestion, ear discharge, ear pain, postnasal drip, rhinorrhea, sinus pressure, sinus pain, sneezing and trouble swallowing  Respiratory: Positive for cough  Negative for chest tightness, shortness of breath and wheezing  Gastrointestinal: Negative for abdominal pain, diarrhea, nausea and vomiting  Musculoskeletal: Negative for myalgias  Skin: Negative for rash  Neurological: Negative for light-headedness and headaches           Current Medications       Current Outpatient Medications:     EPINEPHrine (EPIPEN) 0 3 mg/0 3 mL SOAJ, Inject 0 3 mg into a muscle, Disp: , Rfl:     fexofenadine (ALLEGRA) 180 MG tablet, Take 180 mg by mouth, Disp: , Rfl:     norgestimate-ethinyl estradiol (ORTHO TRI-CYCLEN,TRINESSA) 0 18/0 215/0 25 MG-35 MCG per tablet, Take 1 tablet by mouth daily, Disp: 90 tablet, Rfl: 3    pediatric multivitamin-iron (POLY-VI-SOL with IRON) 15 MG chewable tablet, Chew 1 tablet, Disp: , Rfl:     albuterol (ProAir HFA) 90 mcg/act inhaler, Inhale 2 puffs every 6 (six) hours as needed (bronchitis), Disp: 8 5 g, Rfl: 0    brompheniramine-pseudoephedrine-DM 30-2-10 MG/5ML syrup, Take 10 mL by mouth 3 (three) times a day as needed for cough, Disp: 120 mL, Rfl: 0    Current Allergies     Allergies as of 06/12/2021 - Reviewed 06/12/2021   Allergen Reaction Noted    Mupirocin  03/18/2019    Shellfish-derived products - food allergy Rash 03/10/2019    Sulfa antibiotics Rash 12/19/2018            The following portions of the patient's history were reviewed and updated as appropriate: allergies, current medications, past family history, past medical history, past social history, past surgical history and problem list      Past Medical History:   Diagnosis Date    High cholesterol        Past Surgical History:   Procedure Laterality Date    TONSILECTOMY AND ADNOIDECTOMY      TYMPANOSTOMY TUBE PLACEMENT      WISDOM TOOTH EXTRACTION         Family History   Problem Relation Age of Onset    Fibroids Mother     GI problems Father     Seizures Sister     Arthritis Maternal Grandmother          Medications have been verified  Objective   Pulse 94   Temp 97 8 °F (36 6 °C) (Temporal)   Resp 18   Ht 5' 7" (1 702 m)   Wt 56 7 kg (125 lb)   LMP 06/02/2021 (Approximate)   SpO2 97%   BMI 19 58 kg/m²   Patient's last menstrual period was 06/02/2021 (approximate)  Physical Exam     Physical Exam  Vitals signs reviewed  Constitutional:       General: She is not in acute distress       Appearance: She is well-developed  She is not diaphoretic  HENT:      Head: Normocephalic  Right Ear: Tympanic membrane and external ear normal       Left Ear: Tympanic membrane and external ear normal       Nose: Nose normal       Mouth/Throat:      Pharynx: No oropharyngeal exudate or posterior oropharyngeal erythema  Tonsils: No tonsillar exudate or tonsillar abscesses  Eyes:      Conjunctiva/sclera: Conjunctivae normal    Cardiovascular:      Rate and Rhythm: Normal rate and regular rhythm  Heart sounds: Normal heart sounds  No murmur  No friction rub  No gallop  Pulmonary:      Effort: Pulmonary effort is normal  No respiratory distress  Breath sounds: Normal breath sounds  No wheezing or rales  Chest:      Chest wall: No tenderness  Lymphadenopathy:      Cervical: No cervical adenopathy  Skin:     General: Skin is warm  Neurological:      Mental Status: She is alert and oriented to person, place, and time  Psychiatric:         Behavior: Behavior normal          Thought Content:  Thought content normal          Judgment: Judgment normal

## 2021-06-12 NOTE — PATIENT INSTRUCTIONS
Take Bromfed DM and albuterol inhaler as prescribed  Take over the counter Mucinex during the day  Fluids and rest (Warm water with honey and lemon)  Tylenol/Ibuprofen for pain fever  Follow up with PCP in 3-5 days  Proceed to  ER if symptoms worsen  Acute Bronchitis   WHAT YOU NEED TO KNOW:   Acute bronchitis is swelling and irritation in your lungs  It is usually caused by a virus and most often happens in the winter  Bronchitis may also be caused by bacteria or by a chemical irritant, such as smoke  DISCHARGE INSTRUCTIONS:   Return to the emergency department if:   · You cough up blood  · Your lips or fingernails turn blue  · You feel like you are not getting enough air when you breathe  Call your doctor if:   · Your symptoms do not go away or get worse, even after treatment  · Your cough does not get better within 4 weeks  · You have questions or concerns about your condition or care  Medicines: You may  need any of the following:  · Cough suppressants  decrease your urge to cough  · Decongestants  help loosen mucus in your lungs and make it easier to cough up  This can help you breathe easier  · Inhalers  may be given  Your healthcare provider may give you one or more inhalers to help you breathe easier and cough less  An inhaler gives your medicine to open your airways  Ask your healthcare provider to show you how to use your inhaler correctly  · Acetaminophen  decreases pain and fever  It is available without a doctor's order  Ask how much to take and how often to take it  Follow directions  Read the labels of all other medicines you are using to see if they also contain acetaminophen, or ask your doctor or pharmacist  Acetaminophen can cause liver damage if not taken correctly  Do not use more than 4 grams (4,000 milligrams) total of acetaminophen in one day  · NSAIDs  help decrease swelling and pain or fever   This medicine is available with or without a doctor's order  NSAIDs can cause stomach bleeding or kidney problems in certain people  If you take blood thinner medicine, always ask your healthcare provider if NSAIDs are safe for you  Always read the medicine label and follow directions  · Take your medicine as directed  Contact your healthcare provider if you think your medicine is not helping or if you have side effects  Tell him of her if you are allergic to any medicine  Keep a list of the medicines, vitamins, and herbs you take  Include the amounts, and when and why you take them  Bring the list or the pill bottles to follow-up visits  Carry your medicine list with you in case of an emergency  Self-care:   · Drink liquids as directed  You may need to drink more liquids than usual to stay hydrated  Ask how much liquid to drink each day and which liquids are best for you  · Use a cool mist humidifier  to increase air moisture in your home  This may make it easier for you to breathe and help decrease your cough  · Get more rest   Rest helps your body to heal  Slowly start to do more each day  Rest when you feel it is needed  · Avoid irritants in the air  Avoid chemicals, fumes, and dust  Wear a face mask if you must work around dust or fumes  Stay inside on days when air pollution levels are high  If you have allergies, stay inside when pollen counts are high  Do not use aerosol products, such as spray-on deodorant, bug spray, and hair spray  · Do not smoke or be around others who are smoking  Nicotine and other chemicals in cigarettes and cigars can cause lung damage  Ask your healthcare provider for information if you currently smoke and need help to quit  E-cigarettes or smokeless tobacco still contain nicotine  Talk to your healthcare provider before you use these products  Prevent acute bronchitis:       · Get the vaccinations you need  Ask your healthcare provider if you should get the flu or pneumonia vaccines      · Prevent the spread of germs  You can decrease your risk for acute bronchitis and other illnesses by doing the following:     ? Wash your hands often with soap and water  Carry germ-killing hand lotion or gel with you  You can use the lotion or gel to clean your hands when soap and water are not available  ? Do not touch your eyes, nose, or mouth unless you have washed your hands first     ? Always cover your mouth when you cough to prevent the spread of germs  It is best to cough into a tissue or your shirt sleeve instead of into your hand  Ask those around you to cover their mouths when they cough  ? Try to avoid people who have a cold or the flu  If you are sick, stay away from others as much as possible  Follow up with your doctor as directed:  Write down questions you have so you will remember to ask them during your follow-up visits  © Copyright [facility] [address] Information is for End User's use only and may not be sold, redistributed or otherwise used for commercial purposes  All illustrations and images included in CareNotes® are the copyrighted property of A D A M , Inc  or [facility] Dorina Clark   The above information is an  only  It is not intended as medical advice for individual conditions or treatments  Talk to your doctor, nurse or pharmacist before following any medical regimen to see if it is safe and effective for you

## 2021-06-13 ENCOUNTER — TELEPHONE (OUTPATIENT)
Dept: URGENT CARE | Facility: CLINIC | Age: 21
End: 2021-06-13

## 2021-06-13 NOTE — TELEPHONE ENCOUNTER
Patient called to ask for an abx for bronchitis  I informed her that she has viral bronchitis  Abx will not tx condition but may set patient up for C  Diff infection or abx resistance  The coughs worsen for the first 2-3 weeks but improve/linger for total 4-6 weeks  Monitor for new fever, new/worsening SOB, or sudden worsening cough  If this occurs, proceed to medical facility for further evaluation  Recommend continuing tx plan including albuterol, mucinex during the day and cough medication at night  Patient verbalized understanding

## 2021-10-22 ENCOUNTER — TELEPHONE (OUTPATIENT)
Dept: OBGYN CLINIC | Facility: CLINIC | Age: 21
End: 2021-10-22

## 2021-10-22 DIAGNOSIS — Z01.419 ENCOUNTER FOR GYNECOLOGICAL EXAMINATION WITHOUT ABNORMAL FINDING: ICD-10-CM

## 2021-10-22 RX ORDER — NORGESTIMATE AND ETHINYL ESTRADIOL 7DAYSX3 28
1 KIT ORAL DAILY
Qty: 90 TABLET | Refills: 1 | Status: SHIPPED | OUTPATIENT
Start: 2021-10-22 | End: 2022-04-04 | Stop reason: SDUPTHER

## 2022-01-06 ENCOUNTER — TELEPHONE (OUTPATIENT)
Dept: OBGYN CLINIC | Facility: CLINIC | Age: 22
End: 2022-01-06

## 2022-01-06 NOTE — TELEPHONE ENCOUNTER
Pt wants to know if she can get a sample of birth control, her prescription cant be filled until the 20th because of her HRA card and she is running out      Please advise

## 2022-04-04 ENCOUNTER — TELEPHONE (OUTPATIENT)
Dept: OBGYN CLINIC | Facility: CLINIC | Age: 22
End: 2022-04-04

## 2022-04-04 DIAGNOSIS — Z01.419 ENCOUNTER FOR GYNECOLOGICAL EXAMINATION WITHOUT ABNORMAL FINDING: ICD-10-CM

## 2022-04-04 RX ORDER — NORGESTIMATE AND ETHINYL ESTRADIOL 7DAYSX3 28
1 KIT ORAL DAILY
Qty: 28 TABLET | Refills: 0 | Status: SHIPPED | OUTPATIENT
Start: 2022-04-04 | End: 2022-04-29 | Stop reason: SDUPTHER

## 2022-04-29 ENCOUNTER — ANNUAL EXAM (OUTPATIENT)
Dept: OBGYN CLINIC | Facility: CLINIC | Age: 22
End: 2022-04-29
Payer: COMMERCIAL

## 2022-04-29 VITALS
BODY MASS INDEX: 21.75 KG/M2 | WEIGHT: 138.6 LBS | DIASTOLIC BLOOD PRESSURE: 62 MMHG | HEIGHT: 67 IN | SYSTOLIC BLOOD PRESSURE: 120 MMHG

## 2022-04-29 DIAGNOSIS — Z01.419 ENCNTR FOR GYN EXAM (GENERAL) (ROUTINE) W/O ABN FINDINGS: Primary | ICD-10-CM

## 2022-04-29 DIAGNOSIS — Z01.419 ENCOUNTER FOR GYNECOLOGICAL EXAMINATION WITHOUT ABNORMAL FINDING: ICD-10-CM

## 2022-04-29 PROCEDURE — S0612 ANNUAL GYNECOLOGICAL EXAMINA: HCPCS | Performed by: PHYSICIAN ASSISTANT

## 2022-04-29 RX ORDER — NORGESTIMATE AND ETHINYL ESTRADIOL 7DAYSX3 28
1 KIT ORAL DAILY
Qty: 90 TABLET | Refills: 4 | Status: SHIPPED | OUTPATIENT
Start: 2022-04-29 | End: 2023-04-29

## 2022-04-29 NOTE — PROGRESS NOTES
Domikaren Saldana  2000    CC:  Yearly exam    S:  24 y o  female here for yearly exam  Her cycles are regular, not heavy or crampy  Sexual activity: She is sexually active with a newer partner of 6 months without pain, bleeding or dryness  They met 6 months ago and then he immediately deployed with the [employer] and has been gone since  Contraception:  She uses OrthoTriCyclen for contraception  STD testing:  She does not request STD testing today  She had this done recently  Gardasil:  She has had the Gardasil series  We reviewed ASCCP guidelines for Pap testing       Last Pap 12/30/20 neg    Family hx of breast cancer: no  Family hx of ovarian cancer: no  Family hx of colon cancer: no      Current Outpatient Medications:     EPINEPHrine (EPIPEN) 0 3 mg/0 3 mL SOAJ, Inject 0 3 mg into a muscle, Disp: , Rfl:     fexofenadine (ALLEGRA) 180 MG tablet, Take 180 mg by mouth, Disp: , Rfl:     norgestimate-ethinyl estradiol (ORTHO TRI-CYCLEN,TRINESSA) 0 18/0 215/0 25 MG-35 MCG per tablet, Take 1 tablet by mouth daily, Disp: 28 tablet, Rfl: 0    pediatric multivitamin-iron (POLY-VI-SOL with IRON) 15 MG chewable tablet, Chew 1 tablet, Disp: , Rfl:   Social History     Socioeconomic History    Marital status: Single     Spouse name: Not on file    Number of children: Not on file    Years of education: Not on file    Highest education level: Not on file   Occupational History    Not on file   Tobacco Use    Smoking status: Never Smoker    Smokeless tobacco: Never Used   Vaping Use    Vaping Use: Never used   Substance and Sexual Activity    Alcohol use: Yes     Comment: socially     Drug use: No    Sexual activity: Yes     Partners: Male     Birth control/protection: OCP   Other Topics Concern    Not on file   Social History Narrative    Not on file     Social Determinants of Health     Financial Resource Strain: Not on file   Food Insecurity: Not on file   Transportation Needs: Not on file   Physical Activity: Not on file   Stress: Not on file   Social Connections: Not on file   Intimate Partner Violence: Not on file   Housing Stability: Not on file     Family History   Problem Relation Age of Onset    Fibroids Mother     GI problems Father     Seizures Sister     Arthritis Maternal Grandmother      Past Medical History:   Diagnosis Date    High cholesterol        Review of Systems   Respiratory: Negative  Cardiovascular: Negative  Gastrointestinal: Negative for constipation and diarrhea  Genitourinary: Negative for difficulty urinating, pelvic pain, vaginal bleeding, vaginal discharge, itching or odor  O:  Blood pressure 120/62, height 5' 6 93" (1 7 m), weight 62 9 kg (138 lb 9 6 oz), last menstrual period 04/29/2022  Patient appears well and is not in distress  Neck is supple without masses  Breasts are symmetrical without mass, tenderness, nipple discharge, skin changes or adenopathy  Abdomen is soft and nontender without masses  External genitals are normal without lesions or rashes  Urethra and urethral meatus are normal  Bladder is normal to palpation  Vagina is normal without discharge or bleeding  Cervix is normal without discharge or lesion  Uterus is normal, mobile, nontender without palpable mass  Adnexa are normal, nontender, without palpable mass  A:   Yearly exam      P:   Pap 2023   OrthoTriCyclen sent to pharmacy     RTO one year for yearly exam or sooner as needed

## 2022-05-04 ENCOUNTER — TELEPHONE (OUTPATIENT)
Dept: OBGYN CLINIC | Facility: CLINIC | Age: 22
End: 2022-05-04

## 2022-06-27 ENCOUNTER — APPOINTMENT (OUTPATIENT)
Dept: URGENT CARE | Facility: CLINIC | Age: 22
End: 2022-06-27
Payer: COMMERCIAL

## 2022-06-27 DIAGNOSIS — Z02.1 PRE-EMPLOYMENT HEALTH SCREENING EXAMINATION: Primary | ICD-10-CM

## 2022-06-27 LAB — RUBV IGG SERPL IA-ACNC: 35.7 IU/ML

## 2022-06-27 PROCEDURE — 86765 RUBEOLA ANTIBODY: CPT | Performed by: NURSE PRACTITIONER

## 2022-06-27 PROCEDURE — 86480 TB TEST CELL IMMUN MEASURE: CPT | Performed by: NURSE PRACTITIONER

## 2022-06-27 PROCEDURE — 86787 VARICELLA-ZOSTER ANTIBODY: CPT | Performed by: NURSE PRACTITIONER

## 2022-06-27 PROCEDURE — 86762 RUBELLA ANTIBODY: CPT | Performed by: NURSE PRACTITIONER

## 2022-06-27 PROCEDURE — 86735 MUMPS ANTIBODY: CPT | Performed by: NURSE PRACTITIONER

## 2022-06-28 LAB
GAMMA INTERFERON BACKGROUND BLD IA-ACNC: 0.02 IU/ML
M TB IFN-G BLD-IMP: NEGATIVE
M TB IFN-G CD4+ BCKGRND COR BLD-ACNC: 0.01 IU/ML
M TB IFN-G CD4+ BCKGRND COR BLD-ACNC: 0.01 IU/ML
MEV IGG SER QL IA: ABNORMAL
MITOGEN IGNF BCKGRD COR BLD-ACNC: >10 IU/ML
MUV IGG SER QL IA: NORMAL
VZV IGG SER QL IA: ABNORMAL

## 2022-07-28 ENCOUNTER — TELEPHONE (OUTPATIENT)
Dept: OBGYN CLINIC | Facility: CLINIC | Age: 22
End: 2022-07-28

## 2022-07-28 NOTE — TELEPHONE ENCOUNTER
Per communication consent lm for pt that pharmacy might have subbed it for a different generic or  but should be the same dose

## 2022-07-28 NOTE — TELEPHONE ENCOUNTER
Pt's mother, Carol Gonzales, called on Sheila's behalf bc pt is at work about [facility] refill  Pt's dad picked up [facility] and mother realized that it is a different name medicine then previous and wanted to make sure it was correct  Carol Gonzales said the prescription number is the same  Please call pt's mother to discuss as pt is at work

## 2022-09-09 ENCOUNTER — APPOINTMENT (OUTPATIENT)
Dept: URGENT CARE | Facility: CLINIC | Age: 22
End: 2022-09-09

## 2022-09-09 DIAGNOSIS — Z23 ENCOUNTER FOR IMMUNIZATION: Primary | ICD-10-CM

## 2022-09-09 PROCEDURE — 90715 TDAP VACCINE 7 YRS/> IM: CPT

## 2022-10-19 ENCOUNTER — OFFICE VISIT (OUTPATIENT)
Dept: FAMILY MEDICINE CLINIC | Facility: CLINIC | Age: 22
End: 2022-10-19
Payer: COMMERCIAL

## 2022-10-19 VITALS
SYSTOLIC BLOOD PRESSURE: 126 MMHG | TEMPERATURE: 98.6 F | HEIGHT: 67 IN | OXYGEN SATURATION: 98 % | BODY MASS INDEX: 23.39 KG/M2 | WEIGHT: 149 LBS | HEART RATE: 86 BPM | DIASTOLIC BLOOD PRESSURE: 78 MMHG

## 2022-10-19 DIAGNOSIS — H60.501 ACUTE OTITIS EXTERNA OF RIGHT EAR, UNSPECIFIED TYPE: ICD-10-CM

## 2022-10-19 DIAGNOSIS — H66.90 ACUTE OTITIS MEDIA, UNSPECIFIED OTITIS MEDIA TYPE: Primary | ICD-10-CM

## 2022-10-19 DIAGNOSIS — Z86.69 HISTORY OF RECURRENT EAR INFECTION: ICD-10-CM

## 2022-10-19 DIAGNOSIS — F41.9 ANXIETY: ICD-10-CM

## 2022-10-19 PROCEDURE — 99204 OFFICE O/P NEW MOD 45 MIN: CPT | Performed by: FAMILY MEDICINE

## 2022-10-19 RX ORDER — OFLOXACIN 3 MG/ML
10 SOLUTION AURICULAR (OTIC) DAILY
Qty: 10 ML | Refills: 0 | Status: SHIPPED | OUTPATIENT
Start: 2022-10-19 | End: 2022-10-26

## 2022-10-19 RX ORDER — AMOXICILLIN AND CLAVULANATE POTASSIUM 875; 125 MG/1; MG/1
1 TABLET, FILM COATED ORAL EVERY 12 HOURS SCHEDULED
Qty: 14 TABLET | Refills: 0 | Status: SHIPPED | OUTPATIENT
Start: 2022-10-19 | End: 2022-10-26

## 2022-10-19 RX ORDER — ESCITALOPRAM OXALATE 10 MG/1
10 TABLET ORAL DAILY
Qty: 30 TABLET | Refills: 1 | Status: SHIPPED | OUTPATIENT
Start: 2022-10-19
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
## 2022-10-22 PROBLEM — F41.9 ANXIETY: Status: ACTIVE | Noted: 2022-10-22

## 2022-10-22 PROBLEM — Z86.69 HISTORY OF RECURRENT EAR INFECTION: Status: ACTIVE | Noted: 2022-10-22

## 2022-10-26 DIAGNOSIS — Z86.69 HISTORY OF RECURRENT EAR INFECTION: Primary | ICD-10-CM

## 2022-11-04 ENCOUNTER — OFFICE VISIT (OUTPATIENT)
Dept: INTERNAL MEDICINE CLINIC | Facility: CLINIC | Age: 22
End: 2022-11-04

## 2022-11-04 VITALS
DIASTOLIC BLOOD PRESSURE: 82 MMHG | SYSTOLIC BLOOD PRESSURE: 122 MMHG | TEMPERATURE: 98.5 F | BODY MASS INDEX: 22.82 KG/M2 | OXYGEN SATURATION: 98 % | WEIGHT: 142 LBS | HEIGHT: 66 IN | HEART RATE: 84 BPM

## 2022-11-04 DIAGNOSIS — Z00.00 ANNUAL PHYSICAL EXAM: Primary | ICD-10-CM

## 2022-11-04 DIAGNOSIS — Z86.69 HISTORY OF RECURRENT EAR INFECTION: ICD-10-CM

## 2022-11-04 DIAGNOSIS — F41.1 GAD (GENERALIZED ANXIETY DISORDER): ICD-10-CM

## 2022-11-04 DIAGNOSIS — F90.9 ADULT ADHD: ICD-10-CM

## 2022-11-04 DIAGNOSIS — Z13.220 SCREENING FOR HYPERLIPIDEMIA: ICD-10-CM

## 2022-11-04 RX ORDER — DEXTROAMPHETAMINE SACCHARATE, AMPHETAMINE ASPARTATE MONOHYDRATE, DEXTROAMPHETAMINE SULFATE AND AMPHETAMINE SULFATE 1.25; 1.25; 1.25; 1.25 MG/1; MG/1; MG/1; MG/1
5 CAPSULE, EXTENDED RELEASE ORAL EVERY MORNING
Qty: 30 CAPSULE | Refills: 0 | Status: SHIPPED | OUTPATIENT
Start: 2022-11-04 | End: 2022-11-07 | Stop reason: SDUPTHER

## 2022-11-04 NOTE — ASSESSMENT & PLAN NOTE
Patient has Based upon the Diagnostic and Statistical Manual of Mental Health Disorders (DSM-5 guide for mental health disorders)    Patient has positive findings as listed on her Adult ADHD-RS-IV with Adult Prompts Document  How to take this test:    Choose from: no/none; mild; moderate; severe  [place] the choice that applies  Once you have taken the test, resubmit the answers to us and we will enter them into your chart  1  He/She has had ________ findings listed under the carelessness topic:  Is making increased mistakes, rushing through work, not checking work, messy workspace  [place]:  mild  2  He/She has had _________ findings listed under the difficulty sustaining attention in activities topics:    Where he/she has trouble paying attention, able to stay focused on his/her work, takes longer to complete his/her tasks as normal, and has trouble remembering what he/she has read  [place]:  severe  3  He/She has had _________ findings listed under the does not listen topics:   Others have complained that he/she does not seem to listen or respond when spoken to, he/she needs people to repeat directions, and has noticed that he/she misses key parts of conversations  [place]: mild;   4  He/She has ________ findings listed under the no follow-through topic:    He/She has trouble finishing things such as work and chores, he/she does leave things half done or finished, he/she has trouble following instructions which are complex and multi steps, and he/she needs to frequently write things down or he/she will forget them  [place]:  moderate;   5  He/She has _________ findings listed under the can not organized topic:    He/She has trouble organizing tasks and prioritizing work and chores, he/she does require assistance of others to help plan for him/her, he/she does have trouble with time management, and he/she does procrastinate  [place]: moderate;   6   He/She has _________ findings listed under the avoid/dislikes task requiring sustained mental effort topic:    He/She avoids tasks that are difficult or lengthy, he/she has to force himself/herself to finish these tasks, he/she finds it extremely hard, and he/she continues to procrastinate  [place]:  moderate;   7  He/She has __________ findings listed under the lose important items topic:  He/She does frequently lose things, and is always looking for important items  He/She does seem to get in trouble for this  He/She also seems to put items in a safe place that he/she is unable to find later  [place]: no/none;   8  He/She has severe findings listed under the easily distractible topic:  He/She is easily distracted by other conversations, television, radio  He/She does have to remain in isolation of some sort in order to get any work done  Finds it difficult to get back on track once he/she is off track, and will find other things to do rather than his/her chores or work  [place]:  moderate;   9  He/She has severe findings listed under the forgetful in daily activities topic:  He/She forgets his/her daily routine items, and forgets to bring things to and from work on a daily basis  He/She does have to make numerous notes  [place]: no/none;   10  He/She has _________ findings listed under the score was and fidgets topic:  He/She is unable to sit still, and always is fidgeting in his/her chair  He/She does tap and move his/her feet or pen or pencil  He/She does always seem to be fussing either with his/her hair or clothing and he/she states that he/she cannot stop moving it seems automatic  [place]:  moderate;  11  He/She has _________ findings listed under the can not stay seated topic:  He/She does have trouble sitting in 1 section/seat for any length of time  He/She states that he/she would rather be up and standing or able to walk rather than sitting  He/She does have to force himself/herself to stay seated    He/She does usually stay away from any requirements where he/she will need to be seated for long periods of time  [place]:  mild;    12  He/She has _________ findings listed under the runs/climbs excessively topic:  He/She was feels physically restless, and does seem more agitated when he/she has made to sit still  [place]:  mild;   13  He/She has severe findings listed under the can not play/work quietly topic:  He/She is unable to sit and read a book, needs to be doing some type of physical activity  [place]:  moderate;  14  He/She has severe findings listed under the on the go, driven by a motor topic:  He/She feels hard to slow down, and that he/she always has a lot of energy and is always on the go  He/She is unable to relax  [place]:  moderate;   15  He/She has severe findings listed under the talks excessively topic:  He/She talks a lot, all the time, definitely more than other people  He/She also finds herself to be much louder than other people  [place]:  mild;    16  He/She has _________ findings listed under the blurts out answers topic:  He/She states that he/she does blurred out answers, is always having a hard time finding the rationale to wait his/her turn  [place]: no/none;  16  He/She has __________ findings listed under the can not wait for turn topic:  Again he/she finds it hard to weight his/her turn, he/she feels frustrated when there are delays or when he/she has to wait in lines  [place]: no/none;   18  He/She has ___________ findings listed under the intrudes/interrupts others topic:  He/She does butt into other people's conversations and seems to interrupt others  [place]:  mild; This patient was educated on side effects, risks of taking this type of medication which include abuse, misuse, dependence, addiction and tolerance  All questions were answered including different dosing levels, increasing and decreasing of of this medication    We discussed their continued open discussions with the PCP in order to make sure that they are on the correct dose  We reviewed the potential side effects of the medications including, but are not limited to, constipation, diarrhea, nausea/upset stomach, drowsiness, fatigue, dizziness, urinary retention, visual changes, insomnia, headaches, nightmares, slowed breathing while sleeping, UTI issues, impaired judgment, addiction issues and the risk of fatal overdose if not taken as prescribed  We discussed the importance of notifying the office/provider immediately of any side effects  We discussed the importance of immediate notification if there are any feelings of suicidality thoughts or behaviors   We discussed the importance of contacting the office if he has any tachycardia or fainting situations  The patient was warned against driving while taking sedation medications  We discussed that sharing medications is a felony  We discussed other side effects such as QT prolongation, risks of Myocardial Infarction (heart attack), stroke and sudden death  We discussed immediate notification if there is any seizure-like activity  We discussed issues with angioedema as an anaphylactic reactions  I have personally reviewed the [facility] website prior to prescribing this medication  The patient understands and agrees to use these medications only as prescribed  At this point in time, the patient is showing no signs of addiction, abuse, diversion or suicidal ideation  All the patient's questions were answered to their satisfaction  [address] [place] [address] Prescription Drug Monitoring Program report was reviewed and was appropriate   All the patient's questions were answered to his/her satisfaction      · Will start the patient on adderall and monitor

## 2022-11-04 NOTE — PROGRESS NOTES
[id number]    NAME: Paul Sifuentes  AGE: 24 y o  SEX: female  : 2000     DATE: 2022     Assessment and Plan:     Problem List Items Addressed This Visit        Other    History of recurrent ear infection     2022  Left ear without issues  Right ear with significant pain 8/10 while lying on her ear on her pillow  Right sided posterior auricular lymph nodes have been enlarged with harness and non-moveable areas in the past  Positive Right sided TMJ area pains - especially with jaw movement  Positive submandibular pain, anterior cervical pain, posterior cervical pain and preauricular pain  Will order her cortisporin ear drops as this has helped her in the recent past  Will also order her a CT scan soft tissue and neck and CT head   Okay to take tylenol and/or motrin          Relevant Medications    neomycin-polymyxin-hydrocortisone (CORTISPORIN) otic solution    Other Relevant Orders    CT head wo contrast    CT soft tissue neck w wo contrast    CIARA (generalized anxiety disorder)     Continue lexapro         Relevant Medications    amphetamine-dextroamphetamine (ADDERALL XR, 5MG,) 5 MG 24 hr capsule    Annual physical exam - Primary     Lifestyle modification, diet and exercise discussed  Medications discussed and refilled appropriately  Labs discussed and ordered   Hepatitis C screening discussed  HIV screening discussed  Depression screening performed  Anxiety screening performed  BMI discussed           Relevant Orders    CBC and differential    Comprehensive metabolic panel    Screening for hyperlipidemia    Relevant Orders    Lipid panel    Adult ADHD     Patient has Based upon the Diagnostic and Statistical Manual of Mental Health Disorders (DSM-5 guide for mental health disorders)    Patient has positive findings as listed on her Adult ADHD-RS-IV with Adult Prompts Document      How to take this test:    Choose from: no/none; mild; moderate; severe  [place] the choice that applies  Once you have taken the test, resubmit the answers to us and we will enter them into your chart  1  He/She has had ________ findings listed under the carelessness topic:  Is making increased mistakes, rushing through work, not checking work, messy workspace  [place]:  mild  2  He/She has had _________ findings listed under the difficulty sustaining attention in activities topics:    Where he/she has trouble paying attention, able to stay focused on his/her work, takes longer to complete his/her tasks as normal, and has trouble remembering what he/she has read  [place]:  severe  3  He/She has had _________ findings listed under the does not listen topics:   Others have complained that he/she does not seem to listen or respond when spoken to, he/she needs people to repeat directions, and has noticed that he/she misses key parts of conversations  [place]: mild;   4  He/She has ________ findings listed under the no follow-through topic:    He/She has trouble finishing things such as work and chores, he/she does leave things half done or finished, he/she has trouble following instructions which are complex and multi steps, and he/she needs to frequently write things down or he/she will forget them  [place]:  moderate;   5  He/She has _________ findings listed under the can not organized topic:    He/She has trouble organizing tasks and prioritizing work and chores, he/she does require assistance of others to help plan for him/her, he/she does have trouble with time management, and he/she does procrastinate  [place]: moderate;   6  He/She has _________ findings listed under the avoid/dislikes task requiring sustained mental effort topic:    He/She avoids tasks that are difficult or lengthy, he/she has to force himself/herself to finish these tasks, he/she finds it extremely hard, and he/she continues to procrastinate    [place]:  moderate; 7  He/She has __________ findings listed under the lose important items topic:  He/She does frequently lose things, and is always looking for important items  He/She does seem to get in trouble for this  He/She also seems to put items in a safe place that he/she is unable to find later  [place]: no/none;   8  He/She has severe findings listed under the easily distractible topic:  He/She is easily distracted by other conversations, television, radio  He/She does have to remain in isolation of some sort in order to get any work done  Finds it difficult to get back on track once he/she is off track, and will find other things to do rather than his/her chores or work  [place]:  moderate;   9  He/She has severe findings listed under the forgetful in daily activities topic:  He/She forgets his/her daily routine items, and forgets to bring things to and from work on a daily basis  He/She does have to make numerous notes  [place]: no/none;   10  He/She has _________ findings listed under the score was and fidgets topic:  He/She is unable to sit still, and always is fidgeting in his/her chair  He/She does tap and move his/her feet or pen or pencil  He/She does always seem to be fussing either with his/her hair or clothing and he/she states that he/she cannot stop moving it seems automatic  [place]:  moderate;  11  He/She has _________ findings listed under the can not stay seated topic:  He/She does have trouble sitting in 1 section/seat for any length of time  He/She states that he/she would rather be up and standing or able to walk rather than sitting  He/She does have to force himself/herself to stay seated  He/She does usually stay away from any requirements where he/she will need to be seated for long periods of time    [place]:  mild;    12  He/She has _________ findings listed under the runs/climbs excessively topic:  He/She was feels physically restless, and does seem more agitated when he/she has made to sit still  [place]:  mild;   13  He/She has severe findings listed under the can not play/work quietly topic:  He/She is unable to sit and read a book, needs to be doing some type of physical activity  [place]:  moderate;  14  He/She has severe findings listed under the on the go, driven by a motor topic:  He/She feels hard to slow down, and that he/she always has a lot of energy and is always on the go  He/She is unable to relax  [place]:  moderate;   15  He/She has severe findings listed under the talks excessively topic:  He/She talks a lot, all the time, definitely more than other people  He/She also finds herself to be much louder than other people  [place]:  mild;    16  He/She has _________ findings listed under the blurts out answers topic:  He/She states that he/she does blurred out answers, is always having a hard time finding the rationale to wait his/her turn  [place]: no/none;  16  He/She has __________ findings listed under the can not wait for turn topic:  Again he/she finds it hard to weight his/her turn, he/she feels frustrated when there are delays or when he/she has to wait in lines  [place]: no/none;   18  He/She has ___________ findings listed under the intrudes/interrupts others topic:  He/She does butt into other people's conversations and seems to interrupt others  [place]:  mild; This patient was educated on side effects, risks of taking this type of medication which include abuse, misuse, dependence, addiction and tolerance  All questions were answered including different dosing levels, increasing and decreasing of of this medication  We discussed their continued open discussions with the PCP in order to make sure that they are on the correct dose       We reviewed the potential side effects of the medications including, but are not limited to, constipation, diarrhea, nausea/upset stomach, drowsiness, fatigue, dizziness, urinary retention, visual changes, insomnia, headaches, nightmares, slowed breathing while sleeping, UTI issues, impaired judgment, addiction issues and the risk of fatal overdose if not taken as prescribed  We discussed the importance of notifying the office/provider immediately of any side effects  We discussed the importance of immediate notification if there are any feelings of suicidality thoughts or behaviors   We discussed the importance of contacting the office if he has any tachycardia or fainting situations  The patient was warned against driving while taking sedation medications  We discussed that sharing medications is a felony  We discussed other side effects such as QT prolongation, risks of Myocardial Infarction (heart attack), stroke and sudden death  We discussed immediate notification if there is any seizure-like activity  We discussed issues with angioedema as an anaphylactic reactions  I have personally reviewed the [address] [place] website prior to prescribing this medication  The patient understands and agrees to use these medications only as prescribed  At this point in time, the patient is showing no signs of addiction, abuse, diversion or suicidal ideation  All the patient's questions were answered to their satisfaction  [place] Prescription Drug Monitoring Program report was reviewed and was appropriate   All the patient's questions were answered to his/her satisfaction  Will start the patient on adderall and monitor          Relevant Medications    amphetamine-dextroamphetamine (ADDERALL XR, 5MG,) 5 MG 24 hr capsule          Immunizations and preventive care screenings were discussed with patient today  Appropriate education was printed on patient's after visit summary  Counseling:  Alcohol/drug use: discussed moderation in alcohol intake, the recommendations for healthy alcohol use, and avoidance of illicit drug use    Dental Health: discussed importance of regular tooth brushing, flossing, and dental visits  Injury prevention: discussed safety/seat belts, safety helmets, smoke detectors, carbon dioxide detectors, and smoking near bedding or upholstery  Sexual health: discussed sexually transmitted diseases, partner selection, use of condoms, avoidance of unintended pregnancy, and contraceptive alternatives  Exercise: the importance of regular exercise/physical activity was discussed  Recommend exercise 3-5 times per week for at least 30 minutes  BMI Counseling: Body mass index is 22 92 kg/m²  The BMI is above normal  Nutrition recommendations include decreasing portion sizes, encouraging healthy choices of fruits and vegetables, decreasing fast food intake, consuming healthier snacks, limiting drinks that contain sugar, moderation in carbohydrate intake, increasing intake of lean protein, reducing intake of saturated and trans fat and reducing intake of cholesterol  Exercise recommendations include exercising 3-5 times per week  No pharmacotherapy was ordered  Rationale for BMI follow-up plan is due to patient being overweight or obese  Depression Screening and Follow-up Plan: Patient was screened for depression during today's encounter  They screened negative with a PHQ-2 score of 0  Return in about 4 weeks (around 12/2/2022) for Recheck right ear pain and ADHD  Chief Complaint:     Chief Complaint   Patient presents with   • Annual Exam     Pt has bilateral ear pain,  discuss meds      History of Present Illness:     Adult Annual Physical   Patient here for a comprehensive physical exam  The patient reports problems - as discussed   Diet and Physical Activity  Diet/Nutrition: well balanced diet  Exercise: 3-4 times a week on average        Depression Screening  PHQ-2/9 Depression Screening    Little interest or pleasure in doing things: 0 - not at all  Feeling down, depressed, or hopeless: 0 - not at all  PHQ-2 Score: 0  PHQ-2 Interpretation: Negative depression screen       General Health  Sleep: sleeps well  Hearing: decreased - right  Vision: wears glasses  Dental: regular dental visits  /GYN Health  Last menstrual period: 10/18/2022  Contraceptive method: oral contraceptives  History of STDs?: no      Review of Systems:     Review of Systems   Constitutional: Negative for activity change, chills, fatigue and fever  HENT: Positive for ear pain (right > left )  Negative for rhinorrhea and sore throat  Eyes: Negative for pain  Respiratory: Negative for cough and shortness of breath  Cardiovascular: Negative for chest pain, palpitations and leg swelling  Gastrointestinal: Negative for abdominal pain, constipation, diarrhea, nausea and vomiting  Genitourinary: Negative for difficulty urinating, flank pain, frequency and urgency  Musculoskeletal: Positive for neck pain (right side)  Negative for gait problem, joint swelling and myalgias  Skin: Negative for color change  Neurological: Negative for dizziness, weakness, light-headedness and headaches  Psychiatric/Behavioral: Negative for sleep disturbance  The patient is not nervous/anxious  All other systems reviewed and are negative  Past Medical History:     History reviewed  No pertinent past medical history     Past Surgical History:     Past Surgical History:   Procedure Laterality Date   • TONSILECTOMY AND ADNOIDECTOMY     • TYMPANOSTOMY TUBE PLACEMENT      6 sets - last time as a child   • WISDOM TOOTH EXTRACTION        Social History:     Social History     Socioeconomic History   • Marital status: Single     Spouse name: None   • Number of children: None   • Years of education: None   • Highest education level: None   Occupational History   • None   Tobacco Use   • Smoking status: Never Smoker   • Smokeless tobacco: Never Used   Vaping Use   • Vaping Use: Never used   Substance and Sexual Activity   • Alcohol use: Yes     Comment: socially    • Drug use: No   • Sexual activity: Yes     Partners: Male     Birth control/protection: OCP   Other Topics Concern   • None   Social History Narrative   • None     Social Determinants of Health     Financial Resource Strain: Not on file   Food Insecurity: Not on file   Transportation Needs: Not on file   Physical Activity: Not on file   Stress: Not on file   Social Connections: Not on file   Intimate Partner Violence: Not on file   Housing Stability: Not on file      Family History:     Family History   Problem Relation Age of Onset   • Fibroids Mother    • GI problems Father    • Seizures Sister    • Arthritis Maternal Grandmother       Current Medications:     Current Outpatient Medications   Medication Sig Dispense Refill   • amphetamine-dextroamphetamine (ADDERALL XR, 5MG,) 5 MG 24 hr capsule Take 1 capsule (5 mg total) by mouth every morning Max Daily Amount: 5 mg 30 capsule 0   • EPINEPHrine (EPIPEN) 0 3 mg/0 3 mL SOAJ Inject 0 3 mg into a muscle     • escitalopram (Lexapro) 10 mg tablet Take 1 tablet (10 mg total) by mouth daily 30 tablet 1   • fexofenadine (ALLEGRA) 180 MG tablet Take 180 mg by mouth     • mometasone (ELOCON) 0 1 % cream Apply topically daily 45 g 0   • neomycin-polymyxin-hydrocortisone (CORTISPORIN) otic solution Administer 4 drops into both ears every 6 (six) hours 10 mL 1   • norgestimate-ethinyl estradiol (ORTHO TRI-CYCLEN,TRINESSA) 0 18/0 215/0 25 MG-35 MCG per tablet Take 1 tablet by mouth daily 90 tablet 4     No current facility-administered medications for this visit  Allergies: Allergies   Allergen Reactions   • Mupirocin      tongue swollen   • Sulfa Antibiotics Rash      Physical Exam:     /82 (BP Location: Left arm, Patient Position: Sitting, Cuff Size: Standard)   Pulse 84   Temp 98 5 °F (36 9 °C)   Ht 5' 6" (1 676 m)   Wt 64 4 kg (142 lb)   SpO2 98%   BMI 22 92 kg/m²     Physical Exam  Vitals and nursing note reviewed  Constitutional:       General: She is awake   She is not in acute distress  Appearance: Normal appearance  She is well-developed and overweight  HENT:      Head: Normocephalic and atraumatic  Comments: Locations of pain      Right Ear: Decreased hearing noted  Swelling and tenderness present  No drainage  Tympanic membrane is erythematous and bulging  Nose: Nose normal       Mouth/Throat:      Mouth: Mucous membranes are moist    Eyes:      Conjunctiva/sclera: Conjunctivae normal    Cardiovascular:      Rate and Rhythm: Normal rate and regular rhythm  Pulses: Normal pulses  Heart sounds: Normal heart sounds  No murmur heard  Pulmonary:      Effort: Pulmonary effort is normal  No respiratory distress  Breath sounds: Normal breath sounds  Abdominal:      General: Bowel sounds are normal       Palpations: Abdomen is soft  Tenderness: There is no abdominal tenderness  Musculoskeletal:      Cervical back: Neck supple  Right lower leg: No edema  Left lower leg: No edema  Skin:     General: Skin is warm and dry  Neurological:      Mental Status: She is alert and oriented to person, place, and time  Psychiatric:         Attention and Perception: Attention normal          Mood and Affect: Mood is anxious  Speech: Speech normal          Behavior: Behavior normal  Behavior is cooperative            PABLITO Land   [facility]

## 2022-11-04 NOTE — ASSESSMENT & PLAN NOTE
Lifestyle modification, diet and exercise discussed  Medications discussed and refilled appropriately  Labs discussed and ordered   Hepatitis C screening discussed  HIV screening discussed  Depression screening performed  Anxiety screening performed  BMI discussed

## 2022-11-04 NOTE — PATIENT INSTRUCTIONS
Problem List Items Addressed This Visit          Other    History of recurrent ear infection     11/4/2022  Left ear without issues  Right ear with significant pain 8/10 while lying on her ear on her pillow  Right sided posterior auricular lymph nodes have been enlarged with harness and non-moveable areas in the past  Positive Right sided TMJ area pains - especially with jaw movement  Positive submandibular pain, anterior cervical pain, posterior cervical pain and preauricular pain  Will order her cortisporin ear drops as this has helped her in the recent past  Will also order her a CT scan soft tissue and neck and CT head   Okay to take tylenol and/or motrin            Relevant Medications    neomycin-polymyxin-hydrocortisone (CORTISPORIN) otic solution    Other Relevant Orders    CT head wo contrast    CT soft tissue neck w wo contrast    CIARA (generalized anxiety disorder)     Continue lexapro           Relevant Medications    amphetamine-dextroamphetamine (ADDERALL XR, 5MG,) 5 MG 24 hr capsule    Annual physical exam - Primary     Lifestyle modification, diet and exercise discussed  Medications discussed and refilled appropriately  Labs discussed and ordered   Hepatitis C screening discussed  HIV screening discussed  Depression screening performed  Anxiety screening performed  BMI discussed             Relevant Orders    CBC and differential    Comprehensive metabolic panel    Screening for hyperlipidemia    Relevant Orders    Lipid panel    Adult ADHD     Patient has Based upon the Diagnostic and Statistical Manual of Mental Health Disorders (DSM-5 guide for mental health disorders)    Patient has positive findings as listed on her Adult ADHD-RS-IV with Adult Prompts Document  How to take this test:    Choose from: no/none; mild; moderate; severe  [place] the choice that applies  Once you have taken the test, resubmit the answers to us and we will enter them into your chart         1  He/She has had ________ findings listed under the carelessness topic:  Is making increased mistakes, rushing through work, not checking work, messy workspace  [place]:  mild  2  He/She has had _________ findings listed under the difficulty sustaining attention in activities topics:    Where he/she has trouble paying attention, able to stay focused on his/her work, takes longer to complete his/her tasks as normal, and has trouble remembering what he/she has read  [place]:  severe  3  He/She has had _________ findings listed under the does not listen topics:   Others have complained that he/she does not seem to listen or respond when spoken to, he/she needs people to repeat directions, and has noticed that he/she misses key parts of conversations  [place]: mild;   4  He/She has ________ findings listed under the no follow-through topic:    He/She has trouble finishing things such as work and chores, he/she does leave things half done or finished, he/she has trouble following instructions which are complex and multi steps, and he/she needs to frequently write things down or he/she will forget them  [place]:  moderate;   5  He/She has _________ findings listed under the can not organized topic:    He/She has trouble organizing tasks and prioritizing work and chores, he/she does require assistance of others to help plan for him/her, he/she does have trouble with time management, and he/she does procrastinate  [place]: moderate;   6  He/She has _________ findings listed under the avoid/dislikes task requiring sustained mental effort topic:    He/She avoids tasks that are difficult or lengthy, he/she has to force himself/herself to finish these tasks, he/she finds it extremely hard, and he/she continues to procrastinate  [place]:  moderate;   7  He/She has __________ findings listed under the lose important items topic:  He/She does frequently lose things, and is always looking for important items    He/She does seem to get in trouble for this  He/She also seems to put items in a safe place that he/she is unable to find later  [place]: no/none;   8  He/She has severe findings listed under the easily distractible topic:  He/She is easily distracted by other conversations, television, radio  He/She does have to remain in isolation of some sort in order to get any work done  Finds it difficult to get back on track once he/she is off track, and will find other things to do rather than his/her chores or work  [place]:  moderate;   9  He/She has severe findings listed under the forgetful in daily activities topic:  He/She forgets his/her daily routine items, and forgets to bring things to and from work on a daily basis  He/She does have to make numerous notes  [place]: no/none;   10  He/She has _________ findings listed under the score was and fidgets topic:  He/She is unable to sit still, and always is fidgeting in his/her chair  He/She does tap and move his/her feet or pen or pencil  He/She does always seem to be fussing either with his/her hair or clothing and he/she states that he/she cannot stop moving it seems automatic  [place]:  moderate;  11  He/She has _________ findings listed under the can not stay seated topic:  He/She does have trouble sitting in 1 section/seat for any length of time  He/She states that he/she would rather be up and standing or able to walk rather than sitting  He/She does have to force himself/herself to stay seated  He/She does usually stay away from any requirements where he/she will need to be seated for long periods of time  [place]:  mild;    12  He/She has _________ findings listed under the runs/climbs excessively topic:  He/She was feels physically restless, and does seem more agitated when he/she has made to sit still    [place]:  mild;   13  He/She has severe findings listed under the can not play/work quietly topic:  He/She is unable to sit and read a book, needs to be doing some type of physical activity  [place]:  moderate;  14  He/She has severe findings listed under the on the go, driven by a motor topic:  He/She feels hard to slow down, and that he/she always has a lot of energy and is always on the go  He/She is unable to relax  [place]:  moderate;   15  He/She has severe findings listed under the talks excessively topic:  He/She talks a lot, all the time, definitely more than other people  He/She also finds herself to be much louder than other people  [place]:  mild;    16  He/She has _________ findings listed under the blurts out answers topic:  He/She states that he/she does blurred out answers, is always having a hard time finding the rationale to wait his/her turn  [place]: no/none;  16  He/She has __________ findings listed under the can not wait for turn topic:  Again he/she finds it hard to weight his/her turn, he/she feels frustrated when there are delays or when he/she has to wait in lines  [place]: no/none;   18  He/She has ___________ findings listed under the intrudes/interrupts others topic:  He/She does butt into other people's conversations and seems to interrupt others  [place]:  mild; This patient was educated on side effects, risks of taking this type of medication which include abuse, misuse, dependence, addiction and tolerance  All questions were answered including different dosing levels, increasing and decreasing of of this medication  We discussed their continued open discussions with the PCP in order to make sure that they are on the correct dose  We reviewed the potential side effects of the medications including, but are not limited to, constipation, diarrhea, nausea/upset stomach, drowsiness, fatigue, dizziness, urinary retention, visual changes, insomnia, headaches, nightmares, slowed breathing while sleeping, UTI issues, impaired judgment, addiction issues and the risk of fatal overdose if not taken as prescribed     We discussed the importance of notifying the office/provider immediately of any side effects  We discussed the importance of immediate notification if there are any feelings of suicidality thoughts or behaviors   We discussed the importance of contacting the office if he has any tachycardia or fainting situations  The patient was warned against driving while taking sedation medications  We discussed that sharing medications is a felony  We discussed other side effects such as QT prolongation, risks of Myocardial Infarction (heart attack), stroke and sudden death  We discussed immediate notification if there is any seizure-like activity  We discussed issues with angioedema as an anaphylactic reactions  I have personally reviewed the [address] [place] [address] ([facility]) website prior to prescribing this medication  The patient understands and agrees to use these medications only as prescribed  At this point in time, the patient is showing no signs of addiction, abuse, diversion or suicidal ideation  All the patient's questions were answered to their satisfaction  [place] Prescription Drug Monitoring Program report was reviewed and was appropriate   All the patient's questions were answered to his/her satisfaction      Will start the patient on adderall and monitor          Relevant Medications    amphetamine-dextroamphetamine (ADDERALL XR, 5MG,) 5 MG 24 hr capsule

## 2022-11-04 NOTE — ASSESSMENT & PLAN NOTE
· 11/4/2022  · Left ear without issues  · Right ear with significant pain 8/10 while lying on her ear on her pillow  · Right sided posterior auricular lymph nodes have been enlarged with harness and non-moveable areas in the past  · Positive Right sided TMJ area pains - especially with jaw movement  · Positive submandibular pain, anterior cervical pain, posterior cervical pain and preauricular pain  · Will order her cortisporin ear drops as this has helped her in the recent past  · Will also order her a CT scan soft tissue and neck and CT head   · Okay to take tylenol and/or motrin

## 2022-11-07 DIAGNOSIS — F90.9 ADULT ADHD: ICD-10-CM

## 2022-11-08 DIAGNOSIS — F90.9 ADULT ADHD: ICD-10-CM

## 2022-11-08 RX ORDER — DEXTROAMPHETAMINE SACCHARATE, AMPHETAMINE ASPARTATE MONOHYDRATE, DEXTROAMPHETAMINE SULFATE AND AMPHETAMINE SULFATE 1.25; 1.25; 1.25; 1.25 MG/1; MG/1; MG/1; MG/1
5 CAPSULE, EXTENDED RELEASE ORAL EVERY MORNING
Qty: 30 CAPSULE | Refills: 0 | Status: SHIPPED | OUTPATIENT
Start: 2022-11-08 | End: 2022-11-08

## 2022-11-08 RX ORDER — DEXTROAMPHETAMINE SACCHARATE, AMPHETAMINE ASPARTATE MONOHYDRATE, DEXTROAMPHETAMINE SULFATE AND AMPHETAMINE SULFATE 1.25; 1.25; 1.25; 1.25 MG/1; MG/1; MG/1; MG/1
5 CAPSULE, EXTENDED RELEASE ORAL EVERY MORNING
Qty: 30 CAPSULE | Refills: 0 | Status: SHIPPED | OUTPATIENT
Start: 2022-11-08

## 2022-11-11 DIAGNOSIS — F41.9 ANXIETY: ICD-10-CM

## 2022-11-11 RX ORDER — ESCITALOPRAM OXALATE 10 MG/1
TABLET ORAL
Qty: 90 TABLET | Refills: 1 | Status: SHIPPED | OUTPATIENT
Start: 2022-11-11

## 2022-12-02 ENCOUNTER — OFFICE VISIT (OUTPATIENT)
Dept: INTERNAL MEDICINE CLINIC | Facility: CLINIC | Age: 22
End: 2022-12-02

## 2022-12-02 VITALS
BODY MASS INDEX: 23.89 KG/M2 | WEIGHT: 148 LBS | TEMPERATURE: 99 F | HEART RATE: 95 BPM | SYSTOLIC BLOOD PRESSURE: 110 MMHG | DIASTOLIC BLOOD PRESSURE: 78 MMHG | OXYGEN SATURATION: 98 %

## 2022-12-02 DIAGNOSIS — F41.9 ANXIETY: ICD-10-CM

## 2022-12-02 DIAGNOSIS — F41.1 GAD (GENERALIZED ANXIETY DISORDER): ICD-10-CM

## 2022-12-02 DIAGNOSIS — F90.9 ADULT ADHD: ICD-10-CM

## 2022-12-02 DIAGNOSIS — Z86.69 HISTORY OF RECURRENT EAR INFECTION: Primary | ICD-10-CM

## 2022-12-02 RX ORDER — DEXTROAMPHETAMINE SACCHARATE, AMPHETAMINE ASPARTATE MONOHYDRATE, DEXTROAMPHETAMINE SULFATE AND AMPHETAMINE SULFATE 2.5; 2.5; 2.5; 2.5 MG/1; MG/1; MG/1; MG/1
10 CAPSULE, EXTENDED RELEASE ORAL EVERY MORNING
Qty: 30 CAPSULE | Refills: 0 | Status: SHIPPED | OUTPATIENT
Start: 2022-12-02

## 2022-12-02 RX ORDER — ESCITALOPRAM OXALATE 10 MG/1
10 TABLET ORAL DAILY
Qty: 90 TABLET | Refills: 3 | Status: SHIPPED | OUTPATIENT
Start: 2022-12-02

## 2022-12-02 RX ORDER — PENICILLIN V POTASSIUM 500 MG/1
500 TABLET ORAL EVERY 12 HOURS
Qty: 20 TABLET | Refills: 0 | Status: SHIPPED | OUTPATIENT
Start: 2022-12-02 | End: 2022-12-12

## 2022-12-02 NOTE — PROGRESS NOTES
Assessment/Plan:     Problem List Items Addressed This Visit        Other    History of recurrent ear infection - Primary     • 11/4/2022  • Left ear without issues  • Right ear with significant pain 8/10 while lying on her ear on her pillow  • Right sided posterior auricular lymph nodes have been enlarged with harness and non-moveable areas in the past  • Positive Right sided TMJ area pains - especially with jaw movement  • Positive submandibular pain, anterior cervical pain, posterior cervical pain and preauricular pain  • Will order her cortisporin ear drops as this has helped her in the recent past  • Will also order her a CT scan soft tissue and neck and CT head - these were denied by her insurance provider  • Okay to take tylenol and/or motrin   • 12/2/2022  • Beginning on 11/24/2022 she started with right ear pain, sinus pain, sore throat and head congestion  • This has been an issue every 3 weeks despite being on oral antibiotics and ear drops  • After reviewing the patient's chart further; it is determined that she has a strong history for allergic rhinitis  I have reviewed this with the patient in detail  • The patient has failed treatment with over-the-counter and prescription medications  · There are signs of acute ear infection  · Treatment options include:  Intranasal steroids, intranasal antihistamines, oral antihistamines, oral antibiotics, oral antifungals, control of underlying allergies, nasal decongestants (short term), oral decongestants, and oral steroids  · She also has chronic right ear and head pain for the past 3 months duration of uncertain etiology  · I ordered a ct scan for further workup  · Her right ear canal is much smaller than her left canal  · Her left ear is not red, the TM is normal  · Her right hear is erythematous, tender to the touch, her outer ear feels warm, and her TM is bulging    · I am concerned that she has a mastoiditis due to the increased number of acute otitis media infections she has had in the last 3-4 months  · She has followed with ENT for this situation already  · She also has had eye pain due to the pain in her ear  · Will start her on penicillin and monitor her pain level which is currently 8/10              Relevant Medications    penicillin V potassium (VEETID) 500 mg tablet    Other Relevant Orders    CT orbits/temporal bones/skull base wo contrast    CIARA (generalized anxiety disorder)     · Continue lexapro          Relevant Medications    escitalopram (LEXAPRO) 10 mg tablet    amphetamine-dextroamphetamine (ADDERALL XR, 10MG,) 10 MG 24 hr capsule    Adult ADHD     · Continue adderall, but increase her dose to 10mg an continue to montior her  Relevant Medications    escitalopram (LEXAPRO) 10 mg tablet    amphetamine-dextroamphetamine (ADDERALL XR, 10MG,) 10 MG 24 hr capsule   Other Visit Diagnoses     Anxiety        Relevant Medications    escitalopram (LEXAPRO) 10 mg tablet          Subjective:      Patient ID: Bruce Marshall is a 20 y o  female  The patient is here today to discuss her constant right ear pain that radiates into her sinuses and into her throat  Please continue to the [facility] section of the note for details of today's visit  The following portions of the patient's history were reviewed and updated as appropriate:     Past Medical History:  She has no past medical history on file ,  _______________________________________________________________________  Medical Problems:  does not have any pertinent problems on file ,  _______________________________________________________________________  Past Surgical History:   has a past surgical history that includes Tympanostomy tube placement; TONSILECTOMY AND ADNOIDECTOMY; and [place] tooth extraction  ,  _______________________________________________________________________  Family History:  family history includes Arthritis in her maternal grandmother; Fibroids in her mother; GI problems in her father; Seizures in her sister  ,  _______________________________________________________________________  Social History:   reports that she has never smoked  She has never used smokeless tobacco  She reports current alcohol use  She reports that she does not use drugs  ,  _______________________________________________________________________  Allergies:  is allergic to mupirocin and sulfa antibiotics     _______________________________________________________________________  Current Outpatient Medications   Medication Sig Dispense Refill   • amphetamine-dextroamphetamine (ADDERALL XR, 10MG,) 10 MG 24 hr capsule Take 1 capsule (10 mg total) by mouth every morning Max Daily Amount: 10 mg 30 capsule 0   • escitalopram (LEXAPRO) 10 mg tablet Take 1 tablet (10 mg total) by mouth daily 90 tablet 3   • fexofenadine (ALLEGRA) 180 MG tablet Take 180 mg by mouth     • mometasone (ELOCON) 0 1 % cream Apply topically daily 45 g 0   • neomycin-polymyxin-hydrocortisone (CORTISPORIN) otic solution Administer 4 drops into both ears every 6 (six) hours 10 mL 1   • norgestimate-ethinyl estradiol (ORTHO TRI-CYCLEN,TRINESSA) 0 18/0 215/0 25 MG-35 MCG per tablet Take 1 tablet by mouth daily 90 tablet 4   • penicillin V potassium (VEETID) 500 mg tablet Take 1 tablet (500 mg total) by mouth every 12 (twelve) hours for 10 days 20 tablet 0   • EPINEPHrine (EPIPEN) 0 3 mg/0 3 mL SOAJ Inject 0 3 mg into a muscle (Patient not taking: Reported on 12/2/2022)       No current facility-administered medications for this visit      _______________________________________________________________________      Review of Systems   Constitutional: Negative for activity change, chills, fatigue and fever  HENT: Positive for congestion, ear discharge, ear pain, hearing loss, sinus pressure, sinus pain and sore throat  Negative for rhinorrhea  Eyes: Positive for pain     Respiratory: Negative for cough and shortness of breath  Cardiovascular: Negative for chest pain, palpitations and leg swelling  Gastrointestinal: Negative for abdominal pain, constipation, diarrhea, nausea and vomiting  Genitourinary: Negative for difficulty urinating, flank pain, frequency and urgency  Musculoskeletal: Negative for gait problem, joint swelling and myalgias  Skin: Negative for color change  Neurological: Negative for dizziness, weakness, light-headedness and headaches  Psychiatric/Behavioral: Positive for decreased concentration  Negative for sleep disturbance  The patient is not nervous/anxious  All other systems reviewed and are negative  Objective:  Vitals:    12/02/22 1255   BP: 110/78   BP Location: Left arm   Patient Position: Sitting   Cuff Size: Standard   Pulse: 95   Temp: 99 °F (37 2 °C)   SpO2: 98%   Weight: 67 1 kg (148 lb)     Body mass index is 23 89 kg/m²  Physical Exam  Vitals reviewed  Constitutional:       General: She is awake  Appearance: Normal appearance  She is well-developed and normal weight  HENT:      Head: Normocephalic and atraumatic  Right Ear: Decreased hearing noted  Swelling and tenderness present  Tympanic membrane is erythematous and bulging  Left Ear: Hearing, tympanic membrane, ear canal and external ear normal       Nose: Nose normal       Mouth/Throat:      Mouth: Mucous membranes are moist       Comments: Right side sore throat  Eyes:      Extraocular Movements: Extraocular movements intact  Comments: Right eye pain    Cardiovascular:      Rate and Rhythm: Normal rate and regular rhythm  Pulses: Normal pulses  Heart sounds: Normal heart sounds  Pulmonary:      Effort: Pulmonary effort is normal       Breath sounds: Normal breath sounds  Abdominal:      General: Bowel sounds are normal       Palpations: Abdomen is soft  Musculoskeletal:         General: Normal range of motion  Cervical back: Normal range of motion        Right lower leg: No edema  Left lower leg: No edema  Skin:     General: Skin is warm and dry  Neurological:      Mental Status: She is alert and oriented to person, place, and time  Psychiatric:         Attention and Perception: Attention normal          Mood and Affect: Mood normal          Speech: Speech normal          Behavior: Behavior normal  Behavior is cooperative

## 2022-12-02 NOTE — ASSESSMENT & PLAN NOTE
• 11/4/2022  • Left ear without issues  • Right ear with significant pain 8/10 while lying on her ear on her pillow  • Right sided posterior auricular lymph nodes have been enlarged with harness and non-moveable areas in the past  • Positive Right sided TMJ area pains - especially with jaw movement  • Positive submandibular pain, anterior cervical pain, posterior cervical pain and preauricular pain  • Will order her cortisporin ear drops as this has helped her in the recent past  • Will also order her a CT scan soft tissue and neck and CT head - these were denied by her insurance provider  • Okay to take tylenol and/or motrin   • 12/2/2022  • Beginning on 11/24/2022 she started with right ear pain, sinus pain, sore throat and head congestion  • This has been an issue every 3 weeks despite being on oral antibiotics and ear drops  • After reviewing the patient's chart further; it is determined that she has a strong history for allergic rhinitis  I have reviewed this with the patient in detail  • The patient has failed treatment with over-the-counter and prescription medications  · There are signs of acute ear infection  · Treatment options include:  Intranasal steroids, intranasal antihistamines, oral antihistamines, oral antibiotics, oral antifungals, control of underlying allergies, nasal decongestants (short term), oral decongestants, and oral steroids  · She also has chronic right ear and head pain for the past 3 months duration of uncertain etiology  · I ordered a ct scan for further workup  · Her right ear canal is much smaller than her left canal  · Her left ear is not red, the TM is normal  · Her right hear is erythematous, tender to the touch, her outer ear feels warm, and her TM is bulging    · I am concerned that she has a mastoiditis due to the increased number of acute otitis media infections she has had in the last 3-4 months  · She has followed with ENT for this situation already  · She also has had eye pain due to the pain in her ear     · Will start her on penicillin and monitor her pain level which is currently 8/10

## 2022-12-02 NOTE — PATIENT INSTRUCTIONS
Problem List Items Addressed This Visit          Other    History of recurrent ear infection - Primary     11/4/2022  Left ear without issues  Right ear with significant pain 8/10 while lying on her ear on her pillow  Right sided posterior auricular lymph nodes have been enlarged with harness and non-moveable areas in the past  Positive Right sided TMJ area pains - especially with jaw movement  Positive submandibular pain, anterior cervical pain, posterior cervical pain and preauricular pain  Will order her cortisporin ear drops as this has helped her in the recent past  Will also order her a CT scan soft tissue and neck and CT head - these were denied by her insurance provider  Okay to take tylenol and/or motrin   12/2/2022  Beginning on 11/24/2022 she started with right ear pain, sinus pain, sore throat and head congestion  This has been an issue every 3 weeks despite being on oral antibiotics and ear drops  After reviewing the patient's chart further; it is determined that she has a strong history for allergic rhinitis  I have reviewed this with the patient in detail  The patient has failed treatment with over-the-counter and prescription medications  There are signs of acute ear infection  Treatment options include:  Intranasal steroids, intranasal antihistamines, oral antihistamines, oral antibiotics, oral antifungals, control of underlying allergies, nasal decongestants (short term), oral decongestants, and oral steroids  She also has chronic right ear and head pain for the past 3 months duration of uncertain etiology  I ordered a ct scan for further workup  Her right ear canal is much smaller than her left canal  Her left ear is not red, the TM is normal  Her right hear is erythematous, tender to the touch, her outer ear feels warm, and her TM is bulging    I am concerned that she has a mastoiditis due to the increased number of acute otitis media infections she has had in the last 3-4 months  She has followed with ENT for this situation already  She also has had eye pain due to the pain in her ear  Will start her on penicillin and monitor her pain level which is currently 8/10  Relevant Medications    penicillin V potassium (VEETID) 500 mg tablet    Other Relevant Orders    CT orbits/temporal bones/skull base wo contrast    CIARA (generalized anxiety disorder)     Continue lexapro          Relevant Medications    escitalopram (LEXAPRO) 10 mg tablet    amphetamine-dextroamphetamine (ADDERALL XR, 10MG,) 10 MG 24 hr capsule    Adult ADHD     Continue adderall, but increase her dose to 10mg an continue to montior her           Relevant Medications    escitalopram (LEXAPRO) 10 mg tablet    amphetamine-dextroamphetamine (ADDERALL XR, 10MG,) 10 MG 24 hr capsule     Other Visit Diagnoses       Anxiety        Relevant Medications    escitalopram (LEXAPRO) 10 mg tablet

## 2022-12-14 ENCOUNTER — HOSPITAL ENCOUNTER (OUTPATIENT)
Dept: CT IMAGING | Facility: HOSPITAL | Age: 22
Discharge: HOME | End: 2022-12-14

## 2022-12-14 DIAGNOSIS — Z86.69 HISTORY OF RECURRENT EAR INFECTION: ICD-10-CM

## 2022-12-22 ENCOUNTER — OFFICE VISIT (OUTPATIENT)
Dept: INTERNAL MEDICINE CLINIC | Facility: CLINIC | Age: 22
End: 2022-12-22

## 2022-12-22 VITALS
BODY MASS INDEX: 24.27 KG/M2 | TEMPERATURE: 98.2 F | OXYGEN SATURATION: 98 % | HEIGHT: 66 IN | WEIGHT: 151 LBS | DIASTOLIC BLOOD PRESSURE: 80 MMHG | HEART RATE: 100 BPM | SYSTOLIC BLOOD PRESSURE: 110 MMHG

## 2022-12-22 DIAGNOSIS — Z86.69 HISTORY OF RECURRENT EAR INFECTION: ICD-10-CM

## 2022-12-22 DIAGNOSIS — F90.9 ADULT ADHD: Primary | ICD-10-CM

## 2022-12-22 DIAGNOSIS — B02.21 NEURALGIA, GENICULATE: Primary | ICD-10-CM

## 2022-12-22 DIAGNOSIS — H66.006 RECURRENT ACUTE SUPPURATIVE OTITIS MEDIA WITHOUT SPONTANEOUS RUPTURE OF TYMPANIC MEMBRANE OF BOTH SIDES: ICD-10-CM

## 2022-12-22 DIAGNOSIS — J02.8 ACUTE PHARYNGITIS DUE TO OTHER SPECIFIED ORGANISMS: ICD-10-CM

## 2022-12-22 PROBLEM — G51.8 NEURALGIA, GENICULATE: Status: ACTIVE | Noted: 2022-12-22

## 2022-12-22 PROBLEM — R07.89 CHEST TIGHTNESS: Status: ACTIVE | Noted: 2022-12-22

## 2022-12-22 RX ORDER — PREDNISONE 10 MG/1
10 TABLET ORAL EVERY 8 HOURS SCHEDULED
Qty: 30 TABLET | Refills: 0 | Status: SHIPPED | OUTPATIENT
Start: 2022-12-22 | End: 2023-01-01

## 2022-12-22 RX ORDER — PENICILLIN V POTASSIUM 500 MG/1
500 TABLET ORAL EVERY 8 HOURS SCHEDULED
Qty: 30 TABLET | Refills: 0 | Status: SHIPPED | OUTPATIENT
Start: 2022-12-22 | End: 2023-01-01

## 2022-12-22 RX ORDER — DEXTROAMPHETAMINE SACCHARATE, AMPHETAMINE ASPARTATE MONOHYDRATE, DEXTROAMPHETAMINE SULFATE AND AMPHETAMINE SULFATE 3.75; 3.75; 3.75; 3.75 MG/1; MG/1; MG/1; MG/1
15 CAPSULE, EXTENDED RELEASE ORAL EVERY MORNING
Qty: 30 CAPSULE | Refills: 0 | Status: SHIPPED | OUTPATIENT
Start: 2022-12-22

## 2022-12-22 RX ORDER — METRONIDAZOLE 500 MG/1
500 TABLET ORAL EVERY 8 HOURS SCHEDULED
Qty: 30 TABLET | Refills: 0 | Status: SHIPPED | OUTPATIENT
Start: 2022-12-22 | End: 2023-01-01

## 2022-12-22 NOTE — LETTER
December 22, 2022     Patient: Sylvain York  YOB: 2000  Date of Visit: 12/22/2022      To Whom it May Concern:    William Lafleur is under my professional care  Sarah Beth Quiroz was seen in my office on 12/22/2022  Due to her issue at hand, I would advise her to not return to work today (12/22/2022)  Sarah Beth Quiroz may return to work on 12/27/2022  If you have any questions or concerns, please don't hesitate to call           Sincerely,          PABLITO Colon        CC: No Recipients

## 2022-12-22 NOTE — ASSESSMENT & PLAN NOTE
• 11/4/2022  • Left ear without issues  • Right ear with significant pain 8/10 while lying on her ear on her pillow  • Right sided posterior auricular lymph nodes have been enlarged with harness and non-moveable areas in the past  • Positive Right sided TMJ area pains - especially with jaw movement  • Positive submandibular pain, anterior cervical pain, posterior cervical pain and preauricular pain  • Will order her cortisporin ear drops as this has helped her in the recent past  • Will also order her a CT scan soft tissue and neck and CT head - these were denied by her insurance provider  • Okay to take tylenol and/or motrin   • 12/2/2022  • Beginning on 11/24/2022 she started with right ear pain, sinus pain, sore throat and head congestion  • This has been an issue every 3 weeks despite being on oral antibiotics and ear drops  • After reviewing the patient's chart further; it is determined that she has a strong history for allergic rhinitis  I have reviewed this with the patient in detail     • The patient has failed treatment with over-the-counter and prescription medications  • There are signs of acute ear infection  • Treatment options include:  Intranasal steroids, intranasal antihistamines, oral antihistamines, oral antibiotics, oral antifungals, control of underlying allergies, nasal decongestants (short term), oral decongestants, and oral steroids  • She also has chronic right ear and head pain for the past 3 months duration of uncertain etiology  • I ordered a ct scan for further workup  • Her right ear canal is much smaller than her left canal  • Her left ear is not red, the TM is normal  • Her right hear is erythematous, tender to the touch, her outer ear feels warm, and her TM is bulging    • I am concerned that she has a mastoiditis due to the increased number of acute otitis media infections she has had in the last 3-4 months  • She has followed with ENT for this situation already  • She also has had eye pain due to the pain in her ear  • Will start her on penicillin and monitor her pain level which is currently 8/10

## 2022-12-22 NOTE — PATIENT INSTRUCTIONS
Problem List Items Addressed This Visit          Digestive    Acute pharyngitis due to other specified organisms    Relevant Medications    penicillin V potassium (VEETID) 500 mg tablet    metroNIDAZOLE (FLAGYL) 500 mg tablet    predniSONE 10 mg tablet       Nervous and Auditory    Neuralgia, geniculate - Primary    Relevant Medications    penicillin V potassium (VEETID) 500 mg tablet    metroNIDAZOLE (FLAGYL) 500 mg tablet    predniSONE 10 mg tablet    Recurrent acute suppurative otitis media without spontaneous rupture of tympanic membrane of both sides     12/22/2022  With right > left pain of her outer ear with some pain of the inner ear  Positive right sided lymphadenopathy   Left ear is slightly TM bulging  Right ear canal is swollen with TM bulging  Will order her penicillin, flagyl and prednisone    She should continue with the ear drops          Relevant Medications    penicillin V potassium (VEETID) 500 mg tablet    metroNIDAZOLE (FLAGYL) 500 mg tablet    predniSONE 10 mg tablet       Other    History of recurrent ear infection     11/4/2022  Left ear without issues  Right ear with significant pain 8/10 while lying on her ear on her pillow  Right sided posterior auricular lymph nodes have been enlarged with harness and non-moveable areas in the past  Positive Right sided TMJ area pains - especially with jaw movement  Positive submandibular pain, anterior cervical pain, posterior cervical pain and preauricular pain  Will order her cortisporin ear drops as this has helped her in the recent past  Will also order her a CT scan soft tissue and neck and CT head - these were denied by her insurance provider  Okay to take tylenol and/or motrin   12/2/2022  Beginning on 11/24/2022 she started with right ear pain, sinus pain, sore throat and head congestion  This has been an issue every 3 weeks despite being on oral antibiotics and ear drops  After reviewing the patient's chart further; it is determined that she has a strong history for allergic rhinitis  I have reviewed this with the patient in detail  The patient has failed treatment with over-the-counter and prescription medications  There are signs of acute ear infection  Treatment options include:  Intranasal steroids, intranasal antihistamines, oral antihistamines, oral antibiotics, oral antifungals, control of underlying allergies, nasal decongestants (short term), oral decongestants, and oral steroids  She also has chronic right ear and head pain for the past 3 months duration of uncertain etiology  I ordered a ct scan for further workup  Her right ear canal is much smaller than her left canal  Her left ear is not red, the TM is normal  Her right hear is erythematous, tender to the touch, her outer ear feels warm, and her TM is bulging  I am concerned that she has a mastoiditis due to the increased number of acute otitis media infections she has had in the last 3-4 months  She has followed with ENT for this situation already  She also has had eye pain due to the pain in her ear  Will start her on penicillin and monitor her pain level which is currently 8/10            Relevant Medications    penicillin V potassium (VEETID) 500 mg tablet    metroNIDAZOLE (FLAGYL) 500 mg tablet    predniSONE 10 mg tablet

## 2022-12-22 NOTE — PROGRESS NOTES
Assessment/Plan:     Problem List Items Addressed This Visit        Digestive    Acute pharyngitis due to other specified organisms    Relevant Medications    penicillin V potassium (VEETID) 500 mg tablet    metroNIDAZOLE (FLAGYL) 500 mg tablet    predniSONE 10 mg tablet       Nervous and Auditory    Neuralgia, geniculate - Primary    Relevant Medications    penicillin V potassium (VEETID) 500 mg tablet    metroNIDAZOLE (FLAGYL) 500 mg tablet    predniSONE 10 mg tablet    Recurrent acute suppurative otitis media without spontaneous rupture of tympanic membrane of both sides     · 12/22/2022  · With right > left pain of her outer ear with some pain of the inner ear  · Positive right sided lymphadenopathy   · Left ear is slightly TM bulging  · Right ear canal is swollen with TM bulging  · Will order her penicillin, flagyl and prednisone    · She should continue with the ear drops          Relevant Medications    penicillin V potassium (VEETID) 500 mg tablet    metroNIDAZOLE (FLAGYL) 500 mg tablet    predniSONE 10 mg tablet       Other    History of recurrent ear infection     • 11/4/2022  • Left ear without issues  • Right ear with significant pain 8/10 while lying on her ear on her pillow  • Right sided posterior auricular lymph nodes have been enlarged with harness and non-moveable areas in the past  • Positive Right sided TMJ area pains - especially with jaw movement  • Positive submandibular pain, anterior cervical pain, posterior cervical pain and preauricular pain  • Will order her cortisporin ear drops as this has helped her in the recent past  • Will also order her a CT scan soft tissue and neck and CT head - these were denied by her insurance provider  • Okay to take tylenol and/or motrin   • 12/2/2022  • Beginning on 11/24/2022 she started with right ear pain, sinus pain, sore throat and head congestion  • This has been an issue every 3 weeks despite being on oral antibiotics and ear drops  • After reviewing the patient's chart further; it is determined that she has a strong history for allergic rhinitis  I have reviewed this with the patient in detail     • The patient has failed treatment with over-the-counter and prescription medications  • There are signs of acute ear infection  • Treatment options include:  Intranasal steroids, intranasal antihistamines, oral antihistamines, oral antibiotics, oral antifungals, control of underlying allergies, nasal decongestants (short term), oral decongestants, and oral steroids  • She also has chronic right ear and head pain for the past 3 months duration of uncertain etiology  • I ordered a ct scan for further workup  • Her right ear canal is much smaller than her left canal  • Her left ear is not red, the TM is normal  • Her right hear is erythematous, tender to the touch, her outer ear feels warm, and her TM is bulging  • I am concerned that she has a mastoiditis due to the increased number of acute otitis media infections she has had in the last 3-4 months  • She has followed with ENT for this situation already  • She also has had eye pain due to the pain in her ear  • Will start her on penicillin and monitor her pain level which is currently 8/10  Relevant Medications    penicillin V potassium (VEETID) 500 mg tablet    metroNIDAZOLE (FLAGYL) 500 mg tablet    predniSONE 10 mg tablet       Subjective:      Patient ID: Anuj Negron is a 20 y o  female  The patient is here today to discuss her recurrent right ear and throat pain  Please continue to the [facility] section of the note for details of today's visit          The following portions of the patient's history were reviewed and updated as appropriate:     Past Medical History:  She has no past medical history on file ,  _______________________________________________________________________  Medical Problems:  does not have any pertinent problems on file ,  _______________________________________________________________________  Past Surgical History:   has a past surgical history that includes Tympanostomy tube placement; TONSILECTOMY AND ADNOIDECTOMY; and [place] tooth extraction  ,  _______________________________________________________________________  Family History:  family history includes Arthritis in her maternal grandmother; Fibroids in her mother; GI problems in her father; Seizures in her sister  ,  _______________________________________________________________________  Social History:   reports that she has never smoked  She has never used smokeless tobacco  She reports current alcohol use  She reports that she does not use drugs  ,  _______________________________________________________________________  Allergies:  is allergic to mupirocin and sulfa antibiotics     _______________________________________________________________________  Current Outpatient Medications   Medication Sig Dispense Refill   • amphetamine-dextroamphetamine (ADDERALL XR, 15MG,) 15 MG 24 hr capsule Take 1 capsule (15 mg total) by mouth every morning Max Daily Amount: 15 mg 30 capsule 0   • escitalopram (LEXAPRO) 10 mg tablet Take 1 tablet (10 mg total) by mouth daily 90 tablet 3   • fexofenadine (ALLEGRA) 180 MG tablet Take 180 mg by mouth     • metroNIDAZOLE (FLAGYL) 500 mg tablet Take 1 tablet (500 mg total) by mouth every 8 (eight) hours for 10 days 30 tablet 0   • mometasone (ELOCON) 0 1 % cream Apply topically daily 45 g 0   • neomycin-polymyxin-hydrocortisone (CORTISPORIN) otic solution Administer 4 drops into both ears every 6 (six) hours 10 mL 1   • norgestimate-ethinyl estradiol (ORTHO TRI-CYCLEN,TRINESSA) 0 18/0 215/0 25 MG-35 MCG per tablet Take 1 tablet by mouth daily 90 tablet 4   • penicillin V potassium (VEETID) 500 mg tablet Take 1 tablet (500 mg total) by mouth every 8 (eight) hours for 10 days 30 tablet 0   • predniSONE 10 mg tablet Take 1 tablet (10 mg total) by mouth every 8 (eight) hours for 10 days 30 tablet 0   • EPINEPHrine (EPIPEN) 0 3 mg/0 3 mL SOAJ Inject 0 3 mg into a muscle (Patient not taking: Reported on 12/2/2022)       No current facility-administered medications for this visit      _______________________________________________________________________      Review of Systems   HENT: Positive for ear pain, facial swelling, sore throat and trouble swallowing  Objective:  Vitals:    12/22/22 1240   BP: 110/80   BP Location: Left arm   Patient Position: Sitting   Cuff Size: Standard   Pulse: 100   Temp: 98 2 °F (36 8 °C)   SpO2: 98%   Weight: 68 5 kg (151 lb)   Height: 5' 6" (1 676 m)     Body mass index is 24 37 kg/m²  Physical Exam  HENT:      Right Ear: Swelling and tenderness present  Tympanic membrane is erythematous and bulging  Left Ear: Swelling and tenderness present  Tympanic membrane is erythematous and bulging  Mouth/Throat:      Pharynx: Pharyngeal swelling and posterior oropharyngeal erythema present

## 2022-12-22 NOTE — ASSESSMENT & PLAN NOTE
· 12/22/2022  · With right > left pain of her outer ear with some pain of the inner ear  · Positive right sided lymphadenopathy   · Left ear is slightly TM bulging  · Right ear canal is swollen with TM bulging  · Will order her penicillin, flagyl and prednisone    · She should continue with the ear drops

## 2023-01-03 PROBLEM — Z13.220 SCREENING FOR HYPERLIPIDEMIA: Status: RESOLVED | Noted: 2022-11-04 | Resolved: 2023-01-03

## 2023-01-05 DIAGNOSIS — H83.01 LABYRINTHITIS OF RIGHT EAR: ICD-10-CM

## 2023-01-05 DIAGNOSIS — H83.01 LABYRINTHITIS OF RIGHT EAR: Primary | ICD-10-CM

## 2023-02-03 DIAGNOSIS — F90.9 ADULT ADHD: ICD-10-CM

## 2023-02-03 RX ORDER — DEXTROAMPHETAMINE SACCHARATE, AMPHETAMINE ASPARTATE MONOHYDRATE, DEXTROAMPHETAMINE SULFATE AND AMPHETAMINE SULFATE 3.75; 3.75; 3.75; 3.75 MG/1; MG/1; MG/1; MG/1
15 CAPSULE, EXTENDED RELEASE ORAL EVERY MORNING
Qty: 30 CAPSULE | Refills: 0 | Status: SHIPPED | OUTPATIENT
Start: 2023-02-03

## 2023-02-14 DIAGNOSIS — R68.89 FLU-LIKE SYMPTOMS: ICD-10-CM

## 2023-02-14 DIAGNOSIS — U07.1 COVID-19: Primary | ICD-10-CM

## 2023-02-15 LAB
FLUAV RNA RESP QL NAA+PROBE: NEGATIVE
FLUBV RNA RESP QL NAA+PROBE: NEGATIVE
SARS-COV-2 RNA RESP QL NAA+PROBE: NEGATIVE

## 2023-02-20 PROBLEM — J02.8 ACUTE PHARYNGITIS DUE TO OTHER SPECIFIED ORGANISMS: Status: RESOLVED | Noted: 2022-12-22 | Resolved: 2023-02-20

## 2023-02-20 PROBLEM — H66.006 RECURRENT ACUTE SUPPURATIVE OTITIS MEDIA WITHOUT SPONTANEOUS RUPTURE OF TYMPANIC MEMBRANE OF BOTH SIDES: Status: RESOLVED | Noted: 2022-12-22 | Resolved: 2023-02-20

## 2023-03-03 ENCOUNTER — OFFICE VISIT (OUTPATIENT)
Dept: NEUROSURGERY | Facility: CLINIC | Age: 23
End: 2023-03-03

## 2023-03-03 VITALS
BODY MASS INDEX: 22.76 KG/M2 | HEART RATE: 90 BPM | TEMPERATURE: 98.2 F | WEIGHT: 141.6 LBS | DIASTOLIC BLOOD PRESSURE: 74 MMHG | SYSTOLIC BLOOD PRESSURE: 109 MMHG | RESPIRATION RATE: 16 BRPM | HEIGHT: 66 IN

## 2023-03-03 DIAGNOSIS — Z86.69 HISTORY OF RECURRENT EAR INFECTION: ICD-10-CM

## 2023-03-03 DIAGNOSIS — H92.03 EAR PAIN, BILATERAL: Primary | ICD-10-CM

## 2023-03-03 DIAGNOSIS — B02.21 NEURALGIA, GENICULATE: ICD-10-CM

## 2023-03-03 NOTE — PROGRESS NOTES
Neurosurgery Office Note  Vijaya Corey 20 y o  female MRN: [id number]      Assessment/Plan     History of recurrent ear infection  Patient seen in outpatient office today as a referral by PCP for further work-up and evaluation of recurrent ear infections with unknown etiology  · Patient states she has had recurrent ear infections as a young child and had at least 6 tympanostomy tube placements last one as a child  · She reports at least getting 3 ear infections a month and currently has a ear infection in her right ear following with ENT  Imaging:    CT orbit/temporal bone/skull base without 12/14/2022: There is no abnormality evident  Plan:  · Reviewed imaging with patient  · Patient requires further work-up and evaluation of recurrent ear infections  Ordered MRI brain IAC for further evaluation of possible etiology given recurrent ear infections  · We will follow-up in the next few weeks once imaging completed or sooner symptoms worsen  · Patient made aware to seek care sooner if she develops any new or worsening neurological change or flag signs  · Patient made aware to contact neurosurgery with any further questions or concerns  Diagnoses and all orders for this visit:    Ear pain, bilateral  -     MRI brain IAC wo and w contrast; Future    Neuralgia, geniculate  -     Ambulatory Referral to Neurosurgery    History of recurrent ear infection  -     Ambulatory Referral to Neurosurgery  -     MRI brain IAC wo and w contrast; Future          I have spent a total time of 40 minutes on 03/05/23 in caring for this patient including Diagnostic results, Patient and family education, Impressions, Counseling / Coordination of care, Documenting in the medical record, Reviewing / ordering tests, medicine, procedures   and Obtaining or reviewing history          CHIEF COMPLAINT    Chief Complaint   Patient presents with   • Consult     Brain Intake, referral in chart as per anne faustin with [facility] HISTORY    History of Present Illness     20y o  year old female with past medical history significant for allergies, anxiety, ADHD, tympanostomy tube placement x6 and recurrent ear infections  Patient seen in outpatient office today as referral by PCP for further work-up and evaluation given recurrent ear infections  Patient states she was told she had recurrent ear infections as a young child and has had at least 6 tympanostomy tube placements last one as a child because they kept falling out  She states she at least gets 3 ear infections a month and currently has 1 in her right ear and is following with ENT  Patient endorses bilateral ear pain right worse than left with current infection in her right ear with some loss of hearing secondary to infection  Patient states her right ear when pain severe is tender to touch  She also endorses a headache and reports her bilateral ear pain 6/10  She reports occasional headaches in between her eyes  She also endorses some abdominal pain but did recently get over the stomach flu  She denies any recent falls or traumas or difficulty with her balance  She denies any dizziness, blurry vision, chest pain, shortness of breath, nausea, vomiting, diarrhea, no problems with bowel or bladder, no new weakness or numbness/tingling  Unclear etiology of patient's recurrent ear infections, she requires further work-up and evaluation  HPI    See Discussion    REVIEW OF SYSTEMS    Review of Systems   Constitutional: Negative  HENT: Positive for ear pain (bilateral ear pain, more right then left, rates 6/10 pain) and hearing loss (right ear only during the time of an ear infection)  Current ear infection in right ear   Eyes: Negative  Respiratory: Negative  Cardiovascular: Negative  Gastrointestinal: Negative  Endocrine: Negative  Genitourinary: Negative  Musculoskeletal: Negative  Skin: Negative      Allergic/Immunologic: Positive for environmental allergies  Neurological: Positive for headaches  Negative for dizziness, tremors, seizures, syncope, speech difficulty, weakness, light-headedness and numbness  Hematological: Negative  Psychiatric/Behavioral: Positive for sleep disturbance (due to headaches and ear pain)  ROS reviewed with patient and agree and changes were made as needed    Meds/Allergies     Current Outpatient Medications   Medication Sig Dispense Refill   • amphetamine-dextroamphetamine (ADDERALL XR, 15MG,) 15 MG 24 hr capsule Take 1 capsule (15 mg total) by mouth every morning Max Daily Amount: 15 mg 30 capsule 0   • EPINEPHrine (EPIPEN) 0 3 mg/0 3 mL SOAJ Inject 0 3 mg into a muscle as needed     • escitalopram (LEXAPRO) 10 mg tablet Take 1 tablet (10 mg total) by mouth daily 90 tablet 3   • fexofenadine (ALLEGRA) 180 MG tablet Take 180 mg by mouth as needed     • norgestimate-ethinyl estradiol (ORTHO TRI-CYCLEN,TRINESSA) 0 18/0 215/0 25 MG-35 MCG per tablet Take 1 tablet by mouth daily 90 tablet 4   • mometasone (ELOCON) 0 1 % cream Apply topically daily (Patient not taking: Reported on 3/3/2023) 45 g 0   • Pramoxine-HC-Chloroxylenol 10-10-1 MG/ML SOLN Administer 4 drops into ears 4 (four) times a day Into right ear (Patient not taking: Reported on 3/3/2023) 15 mL 3     No current facility-administered medications for this visit  Allergies   Allergen Reactions   • Mupirocin      tongue swollen   • Sulfa Antibiotics Rash       PAST HISTORY    History reviewed  No pertinent past medical history      Past Surgical History:   Procedure Laterality Date   • TONSILECTOMY AND ADNOIDECTOMY     • TYMPANOSTOMY TUBE PLACEMENT      6 sets - last time as a child   • WISDOM TOOTH EXTRACTION         Social History     Tobacco Use   • Smoking status: Never   • Smokeless tobacco: Never   Vaping Use   • Vaping Use: Never used   Substance Use Topics   • Alcohol use: Yes     Comment: socially    • Drug use: No       Family History Problem Relation Age of Onset   • Fibroids Mother    • GI problems Father    • Seizures Sister    • Arthritis Maternal Grandmother          Above history personally reviewed  EXAM    Vitals:Blood pressure 109/74, pulse 90, temperature 98 2 °F (36 8 °C), resp  rate 16, height 5' 6" (1 676 m), weight 64 2 kg (141 lb 9 6 oz)  ,Body mass index is 22 85 kg/m²  Physical Exam  Vitals reviewed  Constitutional:       General: She is awake  She is not in acute distress  Appearance: Normal appearance  She is not ill-appearing  HENT:      Head: Normocephalic and atraumatic  Ears:      Comments: Looked in patient's bilateral ears no obvious abnormality some wax noted  Eyes:      Extraocular Movements: Extraocular movements intact and EOM normal       Conjunctiva/sclera: Conjunctivae normal       Pupils: Pupils are equal, round, and reactive to light  Cardiovascular:      Rate and Rhythm: Normal rate  Pulmonary:      Effort: Pulmonary effort is normal  No respiratory distress  Chest:      Chest wall: No tenderness  Abdominal:      General: There is no distension  Palpations: Abdomen is soft  Tenderness: There is no abdominal tenderness  Musculoskeletal:         General: Normal range of motion  Cervical back: Normal range of motion and neck supple  Skin:     General: Skin is warm and dry  Neurological:      Mental Status: She is alert and oriented to person, place, and time  Motor: Motor strength is normal       Coordination: Finger-Nose-Finger Test normal       Gait: Gait is intact  Deep Tendon Reflexes:      Reflex Scores:       Bicep reflexes are 2+ on the right side and 2+ on the left side  Patellar reflexes are 2+ on the right side and 2+ on the left side    Psychiatric:         Attention and Perception: Attention and perception normal          Mood and Affect: Mood and affect normal          Speech: Speech normal          Behavior: Behavior normal  Behavior is cooperative  Thought Content: Thought content normal          Cognition and Memory: Cognition and memory normal          Judgment: Judgment normal          Neurologic Exam     Mental Status   Oriented to person, place, and time  Follows 2 step commands  Attention: normal  Concentration: normal    Speech: speech is normal   Level of consciousness: alert  Knowledge: good  Able to perform simple calculations  Able to name object  Normal comprehension  Cranial Nerves     CN III, IV, VI   Pupils are equal, round, and reactive to light  Extraocular motions are normal    CN III: no CN III palsy  CN VI: no CN VI palsy  Nystagmus: none   Diplopia: none  Conjugate gaze: present    CN V   Facial sensation intact  CN VII   Facial expression full, symmetric  CN VIII   CN VIII normal    Hearing: intact    CN IX, X   CN IX normal      CN XI   CN XI normal      CN XII   CN XII normal      Motor Exam   Muscle bulk: normal  Overall muscle tone: normal  Right arm pronator drift: absent  Left arm pronator drift: absent    Strength   Strength 5/5 throughout  Sensory Exam   Light touch normal    Proprioception normal    JPS and DST intact     Gait, Coordination, and Reflexes     Gait  Gait: normal    Coordination   Finger to nose coordination: normal    Tremor   Resting tremor: absent  Intention tremor: absent  Action tremor: absent    Reflexes   Right biceps: 2+  Left biceps: 2+  Right patellar: 2+  Left patellar: 2+  Right Basilio: absent  Left Basilio: absent  Right ankle clonus: absent  Left ankle clonus: absent        MEDICAL DECISION MAKING    Imaging Studies:     No results found  I have personally reviewed pertinent reports     and I have personally reviewed pertinent films in PACS

## 2023-03-05 NOTE — PATIENT INSTRUCTIONS
Patient will follow-up in the next few weeks once MRI brain completed or sooner symptoms worsen    Continue follow-up with ENT for ear infection

## 2023-03-05 NOTE — ASSESSMENT & PLAN NOTE
Patient seen in outpatient office today as a referral by PCP for further work-up and evaluation of recurrent ear infections with unknown etiology  · Patient states she has had recurrent ear infections as a young child and had at least 6 tympanostomy tube placements last one as a child  · She reports at least getting 3 ear infections a month and currently has a ear infection in her right ear following with ENT  Imaging:    CT orbit/temporal bone/skull base without 12/14/2022: There is no abnormality evident  Plan:  · Reviewed imaging with patient  · Patient requires further work-up and evaluation of recurrent ear infections  Ordered MRI brain IAC for further evaluation of possible etiology given recurrent ear infections  · We will follow-up in the next few weeks once imaging completed or sooner symptoms worsen  · Patient made aware to seek care sooner if she develops any new or worsening neurological change or flag signs  · Patient made aware to contact neurosurgery with any further questions or concerns

## 2023-03-07 ENCOUNTER — OFFICE VISIT (OUTPATIENT)
Dept: GASTROENTEROLOGY | Facility: CLINIC | Age: 23
End: 2023-03-07

## 2023-03-07 VITALS
BODY MASS INDEX: 22.66 KG/M2 | DIASTOLIC BLOOD PRESSURE: 76 MMHG | WEIGHT: 141 LBS | SYSTOLIC BLOOD PRESSURE: 128 MMHG | RESPIRATION RATE: 18 BRPM | OXYGEN SATURATION: 99 % | HEIGHT: 66 IN | HEART RATE: 99 BPM

## 2023-03-07 DIAGNOSIS — R10.9 ABDOMINAL PAIN, UNSPECIFIED ABDOMINAL LOCATION: Primary | ICD-10-CM

## 2023-03-07 DIAGNOSIS — R19.4 CHANGE IN BOWEL HABITS: ICD-10-CM

## 2023-03-07 DIAGNOSIS — R11.0 NAUSEA: ICD-10-CM

## 2023-03-07 RX ORDER — DICYCLOMINE HYDROCHLORIDE 10 MG/1
10 CAPSULE ORAL 3 TIMES DAILY PRN
Qty: 30 CAPSULE | Refills: 2 | Status: SHIPPED | OUTPATIENT
Start: 2023-03-07

## 2023-03-07 NOTE — PROGRESS NOTES
[facility] - Outpatient Consultation  Anuj Negron 20 y o  female MRN: [id number]  Encounter: [id number]          ASSESSMENT AND PLAN:      1  Abdominal pain, unspecified abdominal location  - Giardia Antigen with Reflex to Ova and Parasites; Future  - Celiac Disease Panel; Future  - Calprotectin,Fecal; Future  - US abdomen complete; Future  - dicyclomine (BENTYL) 10 mg capsule; Take 1 capsule (10 mg total) by mouth 3 (three) times a day as needed (abdominal pain)  Dispense: 30 capsule; Refill: 2  2  Change in bowel habits  - Giardia Antigen with Reflex to Ova and Parasites; Future  - Celiac Disease Panel; Future  - Calprotectin,Fecal; Future  - dicyclomine (BENTYL) 10 mg capsule; Take 1 capsule (10 mg total) by mouth 3 (three) times a day as needed (abdominal pain)  Dispense: 30 capsule; Refill: 2  3  Nausea    Overall 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, less likely Celiac disease, giardia, inflammatory bowel disease  Given intermittent RLQ pain unrelated to bowel movements, would also want to rule out ovarian/appendiceal etiology for pain  Plan for abdominal US, and non-invasive testing to start including stool studies and Celiac disease panel  Prior H pylori testing negative in 2020  If non-invasive testing unremarkable and persistent symptoms particularly nausea/vomiting, may then plan for EGD  Can also consider trial of Xifaxin if symptoms continue  Will plan for as needed Bentyl    ______________________________________________________________________    HPI:  Gilda Gill is a pleastant 19 y/o female presenting for evaluation of persistent abdominal pain, nausea with occasional vomiting, and change in bowel habits after recent gastroenteritis  Roughly 3-4 weeks ago she had gastroenteritis that recovered quickly   However she has had persistent intolerances to food resulting in upset stomach, lower abdominal pain and intermittent nausea with vomiting  In addition she has had change in bowel habits with more persistent loose stools  Even prior to bout of gastroenteritis she has had intermittent abdominal pain and fluctuation in bowel habits  Denies rectal bleeding  Reports mother with stomach issues, no family history of GI malignancies  REVIEW OF SYSTEMS:    CONSTITUTIONAL: Denies any fever, chills, rigors, and weight loss  HEENT: Denies odynophagia, tinnitus  CARDIOVASCULAR: No chest pain or palpitations  RESPIRATORY: Denies any cough, hemoptysis, shortness of breath or dyspnea on exertion  GASTROINTESTINAL: As noted in the History of Present Illness  GENITOURINARY: No problems with urination  Denies any hematuria or dysuria  NEUROLOGIC: No dizziness or vertigo, denies headaches  MUSCULOSKELETAL: Denies any muscle or joint pain  SKIN: Denies skin rashes or itching  ENDOCRINE:  Denies intolerance to heat or cold  PSYCHOSOCIAL: Denies depression or anxiety  Denies any recent memory loss  Historical Information   History reviewed  No pertinent past medical history    Past Surgical History:   Procedure Laterality Date   • TONSILECTOMY AND ADNOIDECTOMY     • TYMPANOSTOMY TUBE PLACEMENT      6 sets - last time as a child   • WISDOM TOOTH EXTRACTION       Social History   Social History     Substance and Sexual Activity   Alcohol Use Yes    Comment: socially      Social History     Substance and Sexual Activity   Drug Use No     Social History     Tobacco Use   Smoking Status Never   Smokeless Tobacco Never     Family History   Problem Relation Age of Onset   • Fibroids Mother    • GI problems Father    • Seizures Sister    • Arthritis Maternal Grandmother        Meds/Allergies       Current Outpatient Medications:   •  amphetamine-dextroamphetamine (ADDERALL XR, 15MG,) 15 MG 24 hr capsule  •  dicyclomine (BENTYL) 10 mg capsule  •  EPINEPHrine (EPIPEN) 0 3 mg/0 3 mL SOAJ  •  escitalopram (LEXAPRO) 10 mg tablet  •  fexofenadine (ALLEGRA) 180 MG tablet  •  norgestimate-ethinyl estradiol (ORTHO TRI-CYCLEN,TRINESSA) 0 18/0 215/0 25 MG-35 MCG per tablet  •  mometasone (ELOCON) 0 1 % cream  •  Pramoxine-HC-Chloroxylenol 10-10-1 MG/ML SOLN    Allergies   Allergen Reactions   • Mupirocin      tongue swollen   • Sulfa Antibiotics Rash           Objective     Blood pressure 128/76, pulse 99, resp  rate 18, height 5' 6" (1 676 m), weight 64 kg (141 lb), SpO2 99 %  Body mass index is 22 76 kg/m²  PHYSICAL EXAM:      General Appearance:   Alert, cooperative, no distress   HEENT:   Normocephalic, atraumatic, anicteric  Neck:  Supple, symmetrical, trachea midline   Lungs:   Clear to auscultation bilaterally; no rales, rhonchi or wheezing; respirations unlabored    Heart[de-identified]   Regular rate and rhythm; no murmur, rub, or gallop  Abdomen:   Soft, non-tender, non-distended; normal bowel sounds; no masses, no organomegaly    Genitalia:   Deferred    Rectal:   Deferred    Extremities:  No cyanosis, clubbing or edema    Pulses:  2+ and symmetric    Skin:  No jaundice, rashes, or lesions          Lab Results:   No visits with results within 1 Day(s) from this visit  Latest known visit with results is:   Orders Only on 02/14/2023   Component Date Value   • SARS-CoV-2 02/14/2023 Negative    • INFLUENZA A PCR 02/14/2023 Negative    • INFLUENZA B PCR 02/14/2023 Negative          Radiology Results:   No results found

## 2023-03-20 ENCOUNTER — TELEPHONE (OUTPATIENT)
Dept: OBGYN CLINIC | Facility: CLINIC | Age: 23
End: 2023-03-20

## 2023-03-20 ENCOUNTER — TELEPHONE (OUTPATIENT)
Dept: NEUROSURGERY | Facility: CLINIC | Age: 23
End: 2023-03-20

## 2023-03-20 NOTE — TELEPHONE ENCOUNTER
Pt told to stop ocs and have menses this week   Restart new pack on Sun  Pt going on vacation again next week  She presently is on 5th week of active pills

## 2023-03-20 NOTE — TELEPHONE ENCOUNTER
3/20/23- LMOM TO R/S CX'ED 3/28 F/U APT  PT NO SHOW HER 3/17 MRI IAC   THIS IS WILL NEED TO BE COMPLETED PRIOR TO NEXT OFFICE VISIT    Appointment canceled for Eugene Castanon ([id number])   Visit Type: [facility]   Date        Time      Length    Provider                  Department   3/28/2023   10:00 AM  15 mins  Wood Olvera MD [facility] [place]   3/28/2023    9:15 AM  45 mins   PABLITO Griffiths  [facility]      Reason for Cancellation: Canceled via [address]

## 2023-03-29 ENCOUNTER — TELEPHONE (OUTPATIENT)
Dept: NEUROSURGERY | Facility: CLINIC | Age: 23
End: 2023-03-29

## 2023-03-29 NOTE — TELEPHONE ENCOUNTER
"3/29/23:  LMOM FOR PATIENT TO CALL OFFICE TO RESCHEDULE    WAS \"NO SHOW\" FOR MRI BRAIN ON 3/17/22    CANCELLED [facility] LILLIAM GRAHAM/ [facility] ON 3/28/23 - VIA [facility]    "

## 2023-03-30 DIAGNOSIS — F90.9 ADULT ADHD: ICD-10-CM

## 2023-03-30 RX ORDER — DEXTROAMPHETAMINE SACCHARATE, AMPHETAMINE ASPARTATE MONOHYDRATE, DEXTROAMPHETAMINE SULFATE AND AMPHETAMINE SULFATE 3.75; 3.75; 3.75; 3.75 MG/1; MG/1; MG/1; MG/1
15 CAPSULE, EXTENDED RELEASE ORAL EVERY MORNING
Qty: 30 CAPSULE | Refills: 0 | Status: SHIPPED | OUTPATIENT
Start: 2023-03-30

## 2023-05-16 ENCOUNTER — OFFICE VISIT (OUTPATIENT)
Dept: INTERNAL MEDICINE CLINIC | Facility: CLINIC | Age: 23
End: 2023-05-16

## 2023-05-16 VITALS
WEIGHT: 140.4 LBS | TEMPERATURE: 98.3 F | OXYGEN SATURATION: 97 % | HEART RATE: 112 BPM | BODY MASS INDEX: 22.66 KG/M2 | SYSTOLIC BLOOD PRESSURE: 116 MMHG | DIASTOLIC BLOOD PRESSURE: 74 MMHG

## 2023-05-16 DIAGNOSIS — Z92.89: ICD-10-CM

## 2023-05-16 DIAGNOSIS — R07.89 CHEST TIGHTNESS: ICD-10-CM

## 2023-05-16 DIAGNOSIS — F90.9 ADULT ADHD: ICD-10-CM

## 2023-05-16 DIAGNOSIS — F41.1 GAD (GENERALIZED ANXIETY DISORDER): Primary | ICD-10-CM

## 2023-05-16 DIAGNOSIS — R10.84 GENERALIZED ABDOMINAL PAIN: ICD-10-CM

## 2023-05-16 DIAGNOSIS — H66.006 RECURRENT ACUTE SUPPURATIVE OTITIS MEDIA WITHOUT SPONTANEOUS RUPTURE OF TYMPANIC MEMBRANE OF BOTH SIDES: ICD-10-CM

## 2023-05-16 DIAGNOSIS — Z86.69 HISTORY OF RECURRENT EAR INFECTION: ICD-10-CM

## 2023-05-16 DIAGNOSIS — J30.1 SEASONAL ALLERGIC RHINITIS DUE TO POLLEN: ICD-10-CM

## 2023-05-16 RX ORDER — ALBUTEROL SULFATE 90 UG/1
2 AEROSOL, METERED RESPIRATORY (INHALATION) EVERY 6 HOURS PRN
Qty: 18 G | Refills: 5 | Status: SHIPPED | OUTPATIENT
Start: 2023-05-16

## 2023-05-16 RX ORDER — DEXTROAMPHETAMINE SACCHARATE, AMPHETAMINE ASPARTATE MONOHYDRATE, DEXTROAMPHETAMINE SULFATE AND AMPHETAMINE SULFATE 5; 5; 5; 5 MG/1; MG/1; MG/1; MG/1
20 CAPSULE, EXTENDED RELEASE ORAL EVERY MORNING
Qty: 30 CAPSULE | Refills: 0 | Status: SHIPPED | OUTPATIENT
Start: 2023-05-16

## 2023-05-16 RX ORDER — PENICILLIN V POTASSIUM 500 MG/1
500 TABLET ORAL EVERY 8 HOURS SCHEDULED
Qty: 30 TABLET | Refills: 0 | Status: SHIPPED | OUTPATIENT
Start: 2023-05-16 | End: 2023-05-26

## 2023-05-16 RX ORDER — METRONIDAZOLE 500 MG/1
500 TABLET ORAL EVERY 8 HOURS SCHEDULED
Qty: 30 TABLET | Refills: 0 | Status: SHIPPED | OUTPATIENT
Start: 2023-05-16 | End: 2023-05-26

## 2023-05-16 NOTE — ASSESSMENT & PLAN NOTE
· Most likely due to seasonal allergies   · Will order her albuterol inhaler   · Continue to monitor her

## 2023-05-16 NOTE — ASSESSMENT & PLAN NOTE
· 12/14/2022 CT Orbits/Temporal Bones  FINDINGS:   RIGHT TEMPORAL BONE:  PERIAURICULAR SOFT TISSUES:  Normal    EXTERNAL AUDITORY CANAL: Normal    MIDDLE EAR: Normal    OSSICLES:Normal    MASTOID AIR CELLS: Normal    COCHLEA: Normal    OTIC CAPSULE: Normal mineralization    VESTIBULE: Normal    VESTIBULAR AND COCHLEAR AQUEDUCT: Normal   SEMICIRCULAR CANALS: Normal    INTERNAL AUDITORY CANAL: Normal    FACIAL NERVE CANAL: Normal    CAROTID CANAL: Normal    JUGULAR FORAMEN: Normal    LEFT TEMPORAL BONE:  PERIAURICULAR SOFT TISSUES:  Normal    EXTERNAL AUDITORY CANAL: Normal    MIDDLE EAR: Normal    OSSICLES:Normal    MASTOID AIR CELLS: Normal    COCHLEA: Normal    OTIC CAPSULE: Normal mineralization    VESTIBULE: Normal    VESTIBULAR AND COCHLEAR AQUEDUCT: Normal   SEMICIRCULAR CANALS: Normal    INTERNAL AUDITORY CANAL: Normal    FACIAL NERVE CANAL: Normal    CAROTID CANAL: Normal    JUGULAR FORAMEN: Normal    OTHER FINDINGS:  None    IMPRESSION:   There is no abnormality evident

## 2023-05-16 NOTE — ASSESSMENT & PLAN NOTE
· 10/19/2022  · Started on Lexapro 10mg    · 4/21/2023  · Started on Buspar 10mg  · You start with 1 pill daily for 3- 4 days, then take 1 pill twice a day for about 7 days  Add the 'middle of the day' pill only if you are still feeling anxious  Now, there are times when this medication is also used as a prn for when you start feeling anxious, you could try that as well       · 5/16/2023  · Doing well

## 2023-05-16 NOTE — ASSESSMENT & PLAN NOTE
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, less likely Celiac disease, giardia, inflammatory bowel disease  Given intermittent RLQ pain unrelated to bowel movements, would also want to rule out ovarian/appendiceal etiology for pain  ·    · Plan for abdominal US, and non-invasive testing to start including stool studies and Celiac disease panel  Prior H pylori testing negative in 2020  · If non-invasive testing unremarkable and persistent symptoms particularly nausea/vomiting, may then plan for EGD  · Can also consider trial of Xifaxin if symptoms continue    · Will plan for as needed Bentyl    · Ultrasound ordered - not complete as of today  · Stool studies ordered - not complete as of today    · 5/16/2023  · Doing well at this time

## 2023-05-16 NOTE — ASSESSMENT & PLAN NOTE
· 11/4/2022   · Started on Adderall 5mg  · 12/2/2022  · Increased to Adderall 10mg  · 12/22/2022  · Increased to Adderall 15mg  · 5/16/2023  · Increase Adderall 20mg   · As the patient has been noticing some decreased ability to concentrate

## 2023-05-16 NOTE — PROGRESS NOTES
Assessment/Plan:     Problem List Items Addressed This Visit        Respiratory    Allergic rhinitis due to pollen    Relevant Orders    Ambulatory Referral to Otolaryngology       Nervous and Auditory    Recurrent acute suppurative otitis media without spontaneous rupture of tympanic membrane of both sides     · 3/3/2023 Neurology  · Patient seen in outpatient office today as a referral by PCP for further work-up and evaluation of recurrent ear infections with unknown etiology  • Patient states she has had recurrent ear infections as a young child and had at least 6 tympanostomy tube placements last one as a child  • She reports at least getting 3 ear infections a month and currently has a ear infection in her right ear following with ENT  • Imaging:  • CT orbit/temporal bone/skull base without 12/14/2022: There is no abnormality evident  • Plan:  • Reviewed imaging with patient  • Patient requires further work-up and evaluation of recurrent ear infections  Ordered MRI brain IAC for further evaluation of possible etiology given recurrent ear infections  • We will follow-up in the next few weeks once imaging completed or sooner symptoms worsen  • Patient made aware to seek care sooner if she develops any new or worsening neurological change or flag signs  • Patient made aware to contact neurosurgery with any further questions or concerns      · 3/20/2023  · MRI ordered but patient cancelled     · 5/16/2023  · Referral to ENT  · Right ear is worse than Left ear   · Will start her on penicillin and flagyl          Relevant Medications    penicillin V potassium (VEETID) 500 mg tablet    metroNIDAZOLE (FLAGYL) 500 mg tablet       Other    History of recurrent ear infection     · 5/16/2023  · Will place a referral to ENT         Relevant Orders    Ambulatory Referral to Otolaryngology    CIARA (generalized anxiety disorder) - Primary     · 10/19/2022  · Started on Lexapro 10mg    · 4/21/2023  · Started on Buspar 10mg  · You start with 1 pill daily for 3- 4 days, then take 1 pill twice a day for about 7 days  Add the 'middle of the day' pill only if you are still feeling anxious  Now, there are times when this medication is also used as a prn for when you start feeling anxious, you could try that as well       · 5/16/2023  · Doing well         Relevant Medications    amphetamine-dextroamphetamine (ADDERALL XR, 20MG,) 20 MG 24 hr capsule    Adult ADHD     · 11/4/2022   · Started on Adderall 5mg  · 12/2/2022  · Increased to Adderall 10mg  · 12/22/2022  · Increased to Adderall 15mg  · 5/16/2023  · Increase Adderall 20mg   · As the patient has been noticing some decreased ability to concentrate          Relevant Medications    amphetamine-dextroamphetamine (ADDERALL XR, 20MG,) 20 MG 24 hr capsule    Chest tightness     · Most likely due to seasonal allergies   · Will order her albuterol inhaler   · Continue to monitor her         Relevant Medications    albuterol (Ventolin HFA) 90 mcg/act inhaler    History of CT scan of head     · 12/14/2022 CT Orbits/Temporal Bones  FINDINGS:   RIGHT TEMPORAL BONE:  PERIAURICULAR SOFT TISSUES:  Normal    EXTERNAL AUDITORY CANAL: Normal    MIDDLE EAR: Normal    OSSICLES:Normal    MASTOID AIR CELLS: Normal    COCHLEA: Normal    OTIC CAPSULE: Normal mineralization    VESTIBULE: Normal    VESTIBULAR AND COCHLEAR AQUEDUCT: Normal   SEMICIRCULAR CANALS: Normal    INTERNAL AUDITORY CANAL: Normal    FACIAL NERVE CANAL: Normal    CAROTID CANAL: Normal    JUGULAR FORAMEN: Normal    LEFT TEMPORAL BONE:  PERIAURICULAR SOFT TISSUES:  Normal    EXTERNAL AUDITORY CANAL: Normal    MIDDLE EAR: Normal    OSSICLES:Normal    MASTOID AIR CELLS: Normal    COCHLEA: Normal    OTIC CAPSULE: Normal mineralization    VESTIBULE: Normal    VESTIBULAR AND COCHLEAR AQUEDUCT: Normal   SEMICIRCULAR CANALS: Normal    INTERNAL AUDITORY CANAL: Normal    FACIAL NERVE CANAL: Normal    CAROTID CANAL: Normal    JUGULAR FORAMEN: Normal    OTHER FINDINGS:  None    IMPRESSION:   There is no abnormality evident  Generalized abdominal pain     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, less likely Celiac disease, giardia, inflammatory bowel disease  Given intermittent RLQ pain unrelated to bowel movements, would also want to rule out ovarian/appendiceal etiology for pain  ·    · Plan for abdominal US, and non-invasive testing to start including stool studies and Celiac disease panel  Prior H pylori testing negative in 2020  · If non-invasive testing unremarkable and persistent symptoms particularly nausea/vomiting, may then plan for EGD  · Can also consider trial of Xifaxin if symptoms continue  · Will plan for as needed Bentyl    · Ultrasound ordered - not complete as of today  · Stool studies ordered - not complete as of today    · 5/16/2023  · Doing well at this time             Subjective:      Patient ID: Alvin Sanchez is a 20 y o  female  The patient is here today to discuss her ear pain and chest tightness  Please continue to the [facility] section of the note for details of today's visit          The following portions of the patient's history were reviewed and updated as appropriate:     Past Medical History:  She has no past medical history on file ,  _______________________________________________________________________  Medical Problems:  does not have any pertinent problems on file ,  _______________________________________________________________________  Past Surgical History:   has a past surgical history that includes Tympanostomy tube placement; TONSILECTOMY AND ADNOIDECTOMY; and [place] tooth extraction  ,  _______________________________________________________________________  Family History:  family history includes Arthritis in her maternal grandmother; Fibroids in her mother; GI problems in her father; Seizures in her sister  ,  _______________________________________________________________________  Social History:   reports that she has never smoked  She has never used smokeless tobacco  She reports current alcohol use  She reports that she does not use drugs  ,  _______________________________________________________________________  Allergies:  is allergic to mupirocin and sulfa antibiotics     _______________________________________________________________________  Current Outpatient Medications   Medication Sig Dispense Refill   • albuterol (Ventolin HFA) 90 mcg/act inhaler Inhale 2 puffs every 6 (six) hours as needed for wheezing 18 g 5   • amphetamine-dextroamphetamine (ADDERALL XR, 20MG,) 20 MG 24 hr capsule Take 1 capsule (20 mg total) by mouth every morning Max Daily Amount: 20 mg 30 capsule 0   • busPIRone (BUSPAR) 10 mg tablet Take 1 tablet (10 mg total) by mouth 3 (three) times a day 90 tablet 1   • EPINEPHrine (EPIPEN) 0 3 mg/0 3 mL SOAJ Inject 0 3 mg into a muscle as needed     • escitalopram (LEXAPRO) 10 mg tablet Take 1 tablet (10 mg total) by mouth daily 90 tablet 3   • fexofenadine (ALLEGRA) 180 MG tablet Take 180 mg by mouth as needed     • metroNIDAZOLE (FLAGYL) 500 mg tablet Take 1 tablet (500 mg total) by mouth every 8 (eight) hours for 10 days 30 tablet 0   • norgestimate-ethinyl estradiol (ORTHO TRI-CYCLEN,TRINESSA) 0 18/0 215/0 25 MG-35 MCG per tablet Take 1 tablet by mouth daily 90 tablet 4   • penicillin V potassium (VEETID) 500 mg tablet Take 1 tablet (500 mg total) by mouth every 8 (eight) hours for 10 days 30 tablet 0   • Pramoxine-HC-Chloroxylenol 10-10-1 MG/ML SOLN Administer 4 drops into ears 4 (four) times a day Into right ear 15 mL 3   • dicyclomine (BENTYL) 10 mg capsule Take 1 capsule (10 mg total) by mouth 3 (three) times a day as needed (abdominal pain) 30 capsule 2   • mometasone (ELOCON) 0 1 % cream Apply topically daily (Patient not taking: Reported on 3/3/2023) 45 g 0     No current facility-administered medications for this visit      _______________________________________________________________________      Review of Systems   Constitutional: Negative for activity change, chills, fatigue and fever  HENT: Positive for ear pain  Negative for rhinorrhea and sore throat  Eyes: Negative for pain  Respiratory: Positive for chest tightness  Negative for cough and shortness of breath  Cardiovascular: Negative for chest pain, palpitations and leg swelling  Gastrointestinal: Negative for abdominal pain, constipation, diarrhea, nausea and vomiting  Genitourinary: Negative for difficulty urinating, flank pain, frequency and urgency  Musculoskeletal: Negative for gait problem, joint swelling and myalgias  Skin: Negative for color change  Neurological: Negative for dizziness, weakness, light-headedness and headaches  Psychiatric/Behavioral: Negative for sleep disturbance  The patient is not nervous/anxious  All other systems reviewed and are negative  Objective:  Vitals:    05/16/23 0949   BP: 116/74   BP Location: Left arm   Patient Position: Sitting   Cuff Size: Standard   Pulse: (!) 112   Temp: 98 3 °F (36 8 °C)   SpO2: 97%   Weight: 63 7 kg (140 lb 6 4 oz)     Body mass index is 22 66 kg/m²  Physical Exam  Vitals reviewed  Constitutional:       General: She is awake  Appearance: Normal appearance  She is well-developed and normal weight  HENT:      Head: Normocephalic and atraumatic  Right Ear: Swelling and tenderness present  Tympanic membrane is erythematous and bulging  Left Ear: Swelling and tenderness present  Tympanic membrane is erythematous and bulging  Nose: Nose normal       Mouth/Throat:      Mouth: Mucous membranes are moist    Eyes:      Extraocular Movements: Extraocular movements intact  Neck:     Cardiovascular:      Rate and Rhythm: Normal rate and regular rhythm  Pulses: Normal pulses  Heart sounds: Normal heart sounds  Pulmonary:      Effort: Pulmonary effort is normal       Breath sounds: Normal breath sounds  Abdominal:      General: Bowel sounds are normal       Palpations: Abdomen is soft  Musculoskeletal:         General: Normal range of motion  Cervical back: Normal range of motion  Right lower leg: No edema  Left lower leg: No edema  Skin:     General: Skin is warm and dry  Neurological:      Mental Status: She is alert and oriented to person, place, and time  Psychiatric:         Attention and Perception: Attention normal          Mood and Affect: Mood normal          Speech: Speech normal          Behavior: Behavior normal  Behavior is cooperative

## 2023-05-16 NOTE — ASSESSMENT & PLAN NOTE
· 3/3/2023 Neurology  · Patient seen in outpatient office today as a referral by PCP for further work-up and evaluation of recurrent ear infections with unknown etiology  • Patient states she has had recurrent ear infections as a young child and had at least 6 tympanostomy tube placements last one as a child  • She reports at least getting 3 ear infections a month and currently has a ear infection in her right ear following with ENT  • Imaging:  • CT orbit/temporal bone/skull base without 12/14/2022: There is no abnormality evident  • Plan:  • Reviewed imaging with patient  • Patient requires further work-up and evaluation of recurrent ear infections  Ordered MRI brain IAC for further evaluation of possible etiology given recurrent ear infections  • We will follow-up in the next few weeks once imaging completed or sooner symptoms worsen  • Patient made aware to seek care sooner if she develops any new or worsening neurological change or flag signs  • Patient made aware to contact neurosurgery with any further questions or concerns      · 3/20/2023  · MRI ordered but patient cancelled     · 5/16/2023  · Referral to ENT  · Right ear is worse than Left ear   · Will start her on penicillin and flagyl

## 2023-06-19 DIAGNOSIS — Z30.011 ENCOUNTER FOR PRESCRIPTION OF ORAL CONTRACEPTIVES: Primary | ICD-10-CM

## 2023-06-20 RX ORDER — NORGESTIMATE AND ETHINYL ESTRADIOL 7DAYSX3 28
1 KIT ORAL DAILY
Qty: 90 TABLET | Refills: 1 | Status: SHIPPED | OUTPATIENT
Start: 2023-06-20

## 2023-07-11 PROBLEM — G44.89 OTHER HEADACHE SYNDROME: Status: ACTIVE | Noted: 2023-07-11

## 2023-07-14 DIAGNOSIS — Z30.011 ENCOUNTER FOR PRESCRIPTION OF ORAL CONTRACEPTIVES: ICD-10-CM

## 2023-07-14 RX ORDER — NORGESTIMATE AND ETHINYL ESTRADIOL 7DAYSX3 28
1 KIT ORAL DAILY
Qty: 84 TABLET | Refills: 0 | Status: SHIPPED | OUTPATIENT
Start: 2023-07-14

## 2023-07-14 RX ORDER — NORETHINDRONE AND ETHINYL ESTRADIOL
KIT
Qty: 84 TABLET | Refills: 0 | Status: SHIPPED | OUTPATIENT
Start: 2023-07-14 | End: 2023-07-14

## 2023-07-15 PROBLEM — H66.006 RECURRENT ACUTE SUPPURATIVE OTITIS MEDIA WITHOUT SPONTANEOUS RUPTURE OF TYMPANIC MEMBRANE OF BOTH SIDES: Status: RESOLVED | Noted: 2022-12-22 | Resolved: 2023-07-15

## 2023-07-18 ENCOUNTER — OFFICE VISIT (OUTPATIENT)
Dept: GASTROENTEROLOGY | Facility: CLINIC | Age: 23
End: 2023-07-18
Payer: COMMERCIAL

## 2023-07-18 VITALS
WEIGHT: 139.4 LBS | OXYGEN SATURATION: 98 % | TEMPERATURE: 98.1 F | SYSTOLIC BLOOD PRESSURE: 120 MMHG | HEART RATE: 108 BPM | RESPIRATION RATE: 16 BRPM | DIASTOLIC BLOOD PRESSURE: 82 MMHG | HEIGHT: 66 IN | BODY MASS INDEX: 22.4 KG/M2

## 2023-07-18 DIAGNOSIS — R19.4 CHANGE IN BOWEL HABITS: Primary | ICD-10-CM

## 2023-07-18 DIAGNOSIS — R10.13 EPIGASTRIC PAIN: ICD-10-CM

## 2023-07-18 DIAGNOSIS — R10.9 ABDOMINAL PAIN, UNSPECIFIED ABDOMINAL LOCATION: ICD-10-CM

## 2023-07-18 DIAGNOSIS — R12 HEARTBURN: ICD-10-CM

## 2023-07-18 PROCEDURE — 99214 OFFICE O/P EST MOD 30 MIN: CPT | Performed by: PHYSICIAN ASSISTANT

## 2023-07-18 RX ORDER — DICYCLOMINE HCL 20 MG
20 TABLET ORAL EVERY 6 HOURS
Qty: 60 TABLET | Refills: 3 | Status: SHIPPED | OUTPATIENT
Start: 2023-07-18

## 2023-07-18 RX ORDER — FAMOTIDINE 20 MG/1
20 TABLET, FILM COATED ORAL 2 TIMES DAILY
Qty: 60 TABLET | Refills: 5 | Status: SHIPPED | OUTPATIENT
Start: 2023-07-18

## 2023-07-18 NOTE — PROGRESS NOTES
[facility] - Outpatient Follow-up Note  Ankur Roman 20 y.o. female MRN: [id number]  Encounter: [id number]    ASSESSMENT AND PLAN:      1. Change in bowel habits  2. Abdominal pain, unspecified abdominal location    Patient notes change in bowel habit following a presumed infectious gastroenteritis in 03/2023. She is having abdominal cramping and urgent loose stool multiple days out of the week. At times she is endorsing nocturnal BMs though this is a rare event. She previously had stool testing ordered though this was not completed. Given persistent symptoms, I do recommend following through with the stool testing as well as completing serologic evaluation at this time. I am suspicious that this represents a postinfectious irritable bowel syndrome versus longstanding IBS, though noninvasive investigation will evaluate for celiac disease, chronic infections, inflammatory bowel disease, etc.    I have encouraged patient to utilize the antispasmodic as needed, we have increased potency temporarily as this medication has been partially effective in the past.  We did review possible side effects including sedation, dizziness, and constipation. We also reviewed looking into the low FODMAP diet, trial of IBgard. Pending work-up and persistence of symptoms, we may need to consider colonoscopy in the future, though will defer at this time. Given chronicity of symptoms, low index of suspicion for appendiceal etiology, though this is on differential, as well as GYN etiology. If work-up is unremarkable, consider empiric course of Xifaxan for presumed IBS. - Calprotectin,Fecal; Future  - IgA; Future  - Tissue transglutaminase, IgA; Future  - TSH w/Reflex; Future  - Stool Enteric Bacterial Panel by PCR; Future  - Ova and parasite examination; Future  - H. pylori antigen, stool; Future  - Giardia antigen; Future  - Clostridium difficile toxin by PCR with EIA;  Future  - Comprehensive metabolic panel; Future  - CBC and differential; Future  - dicyclomine (BENTYL) 20 mg tablet; Take 1 tablet (20 mg total) by mouth every 6 (six) hours  Dispense: 60 tablet; Refill: 3  - Calprotectin,Fecal; Future    3. Epigastric pain  4. Heartburn    Patient does note intermittent heartburn and upper abdominal, Epigastric, substernal pain. She was evaluated for this dating back to 2020. We will trial her on famotidine twice daily and reevaluate symptoms in short interval.  We will also check abdominal ultrasound which was previously ordered by attending physician to evaluate for any biliary pathology. She is not having frequent nausea or emesis at this time we will continue to monitor for recurrence. If upper GI symptoms persist, may need to consider an upper endoscopy in the future. - famotidine (PEPCID) 20 mg tablet; Take 1 tablet (20 mg total) by mouth 2 (two) times a day  Dispense: 60 tablet; Refill: 5  - US abdomen complete; Future    We will follow-up in approximately 6 to 8 weeks to reassess her symptoms. ______________________________________________________________________    SUBJECTIVE: Patient is a 20 y.o. female who presents today for follow-up regarding abdominal pain and diarrhea. Past medical history significant for CIARA, ADHD, headache syndrome. Patient is new to me. She last saw Dr. Sally Peck in 03/2023 for these GI complaints. She had endorsed a gastroenteritis in early 03/2023 and subsequently development of intolerances to food resulting in upset stomach, lower abdominal pain, and intermittent nausea, emesis, and diarrhea. She had stool testing ordered, and abdominal ultrasound, and was given an antispasmodic to try. 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    07/18/23:     Patient shares that since last office visit, she continues to have intermittent abdominal pain and urgent loose stools.   The symptoms occur approximately 3 to 4 days out of the week. She has not been able to identify any specific dietary triggers, and she is working diligently on eliminating processed foods from her diet. She will have numerous loose to liquid stools during her symptoms days. Diarrhea is typically light brown to green, though more recently she had an episode with red specks in it, and she is uncertain as to whether it was something she ate versus GI bleeding. Abdominal cramping is worse on the right side. She notes very infrequent nocturnal BMs if she eats something close to bed. On the remainder of the days of the week, she has a soft brown bowel movement. Defecation typically improves abdominal discomfort. The antispasmodic previously prescribed was helpful to temporarily alleviate her abdominal pain. She has tried Gaviscon for her symptoms. She denies any large-volume hematochezia or melenic stool. She is lost approximately 3 pounds over the past couple of months. She denies any fevers, chills, night sweats, mouth ulcers, new rashes, joint swelling, recurrent eye infections. She also notes some intermittent heartburn, and chest pain at times. Feels like a sharp shooting pain in her epigastric region. Endorses some nausea though no emesis. At times has poor appetite. No dysphagia or odynophagia. She was last evaluated for atypical chest pain by GI in 11/2020 by Dr. Rupal Cadet. She had an H. pylori test completed which was negative and was recommended to take Pepcid at that time. NSAIDs: No regular use  Tobacco: None  EtOH: Socially    12/2020: Negative H. pylori    Review of Systems   Otherwise Per HPI    Historical Information   History reviewed. No pertinent past medical history.   Past Surgical History:   Procedure Laterality Date   • TONSILECTOMY AND ADNOIDECTOMY     • TYMPANOSTOMY TUBE PLACEMENT      6 sets - last time as a child   • WISDOM TOOTH EXTRACTION       Social History   Social History     Substance and Sexual Activity   Alcohol Use Yes    Comment: socially      Social History     Substance and Sexual Activity   Drug Use No     Social History     Tobacco Use   Smoking Status Never   Smokeless Tobacco Never     Family History   Problem Relation Age of Onset   • Fibroids Mother    • GI problems Father    • Seizures Sister    • Arthritis Maternal Grandmother        Meds/Allergies       Current Outpatient Medications:   •  amphetamine-dextroamphetamine (ADDERALL XR, 20MG,) 20 MG 24 hr capsule  •  busPIRone (BUSPAR) 10 mg tablet  •  dicyclomine (BENTYL) 10 mg capsule  •  EPINEPHrine (EPIPEN) 0.3 mg/0.3 mL SOAJ  •  fexofenadine (ALLEGRA) 180 MG tablet  •  norgestimate-ethinyl estradiol (Tri-Estarylla) 0.18/0.215/0.25 MG-35 MCG per tablet  •  albuterol (Ventolin HFA) 90 mcg/act inhaler  •  escitalopram (LEXAPRO) 10 mg tablet  •  mometasone (ELOCON) 0.1 % cream  •  Pramoxine-HC-Chloroxylenol 10-10-1 MG/ML SOLN    Allergies   Allergen Reactions   • Mupirocin      tongue swollen   • Sulfa Antibiotics Rash     Objective     Blood pressure 120/82, pulse (!) 108, temperature 98.1 °F (36.7 °C), temperature source Temporal, resp. rate 16, height 5' 6" (1.676 m), weight 63.2 kg (139 lb 6.4 oz), SpO2 98 %. Body mass index is 22.5 kg/m². Physical Exam  Vitals and nursing note reviewed. Constitutional:       General: She is not in acute distress. Appearance: Normal appearance. She is not toxic-appearing. HENT:      Head: Normocephalic and atraumatic. Eyes:      General: No scleral icterus. Conjunctiva/sclera: Conjunctivae normal.   Cardiovascular:      Rate and Rhythm: Normal rate. Pulmonary:      Effort: Pulmonary effort is normal. No respiratory distress. Abdominal:      General: Bowel sounds are normal. There is no distension. Palpations: Abdomen is soft. There is no mass. Tenderness: There is no abdominal tenderness. There is no guarding or rebound. Skin:     General: Skin is warm and dry. Coloration: Skin is not jaundiced. Neurological:      General: No focal deficit present. Mental Status: She is alert and oriented to person, place, and time. Psychiatric:         Mood and Affect: Mood normal.         Behavior: Behavior normal.         Lab Results:   No visits with results within 1 Day(s) from this visit. Latest known visit with results is:   Orders Only on 02/14/2023   Component Date Value   • SARS-CoV-2 02/14/2023 Negative    • INFLUENZA A PCR 02/14/2023 Negative    • INFLUENZA B PCR 02/14/2023 Negative      Radiology Results:   No results found. Reddy Zaldivar PA-C    **Please note:  Dictation voice to text software may have been used in the creation of this record. Occasional wrong word or “sound alike” substitutions may have occurred due to the inherent limitations of voice recognition software. Read the chart carefully and recognize, using context, where substitutions have occurred. **

## 2023-07-18 NOTE — PATIENT INSTRUCTIONS
I do think we are dealing with an irritable bowel syndrome or postinfectious IBS type presentation, however, given your symptoms are persisting we are going to investigate and look into possible chronic infections, malabsorption issues like celiac disease, and inflammation in the bowels. Please have the blood work and stool testing completed. Please have the ultrasound completed. We we will try you on Pepcid twice daily. We will continue the as needed Bentyl/dicyclomine, you can take 20 mg tablets up to 3 times daily for abdominal cramping and diarrhea. You may want to look into the low FODMAP diet which tends to help with some of the symptoms. You can also look into IBgard which is a natural supplement and concentrated peppermint oil which tends to help patients with an irritable bowel.

## 2023-08-09 DIAGNOSIS — F90.9 ADULT ADHD: ICD-10-CM

## 2023-08-10 RX ORDER — DEXTROAMPHETAMINE SACCHARATE, AMPHETAMINE ASPARTATE MONOHYDRATE, DEXTROAMPHETAMINE SULFATE AND AMPHETAMINE SULFATE 5; 5; 5; 5 MG/1; MG/1; MG/1; MG/1
20 CAPSULE, EXTENDED RELEASE ORAL EVERY MORNING
Qty: 30 CAPSULE | Refills: 0 | Status: SHIPPED | OUTPATIENT
Start: 2023-08-10

## 2023-08-15 ENCOUNTER — OFFICE VISIT (OUTPATIENT)
Dept: URGENT CARE | Facility: CLINIC | Age: 23
End: 2023-08-15
Payer: COMMERCIAL

## 2023-08-15 VITALS
RESPIRATION RATE: 18 BRPM | OXYGEN SATURATION: 97 % | HEIGHT: 66 IN | WEIGHT: 140 LBS | TEMPERATURE: 97.8 F | SYSTOLIC BLOOD PRESSURE: 130 MMHG | BODY MASS INDEX: 22.5 KG/M2 | HEART RATE: 102 BPM | DIASTOLIC BLOOD PRESSURE: 69 MMHG

## 2023-08-15 DIAGNOSIS — R05.1 ACUTE COUGH: ICD-10-CM

## 2023-08-15 DIAGNOSIS — J02.9 SORE THROAT: Primary | ICD-10-CM

## 2023-08-15 LAB
S PYO AG THROAT QL: NEGATIVE
SARS-COV-2 AG UPPER RESP QL IA: POSITIVE
VALID CONTROL: ABNORMAL

## 2023-08-15 PROCEDURE — 87070 CULTURE OTHR SPECIMN AEROBIC: CPT | Performed by: NURSE PRACTITIONER

## 2023-08-15 PROCEDURE — 87811 SARS-COV-2 COVID19 W/OPTIC: CPT | Performed by: NURSE PRACTITIONER

## 2023-08-15 PROCEDURE — 87880 STREP A ASSAY W/OPTIC: CPT | Performed by: NURSE PRACTITIONER

## 2023-08-15 PROCEDURE — 99213 OFFICE O/P EST LOW 20 MIN: CPT | Performed by: NURSE PRACTITIONER

## 2023-08-15 NOTE — PROGRESS NOTES
[facility]        NAME: Hannah Hansen is a 20 y.o. female  : 2000    MRN: [id number]  DATE: August 15, 2023  TIME: 9:37 AM    Assessment and Plan   Sore throat [J02.9]  1. Sore throat  POCT rapid strepA    Throat culture      2. Acute cough  Poct Covid 19 Rapid Antigen Test        Rapid covid positive. Patient Instructions     Patient Instructions   Patient instructed to self quarantine. Advised to avoid nsaids. If you develop prolonged high fever, worsening or productive cough, shortness of breath, difficulty breathing, chest pain, or any new or concerning symptoms please proceed ER. Instructed patient to call ER prior to arrival make them aware she is being test for covid. Patient verbalizes understanding. Start vitamin c 1g twice daily,vitamin d3 2000IU daily, and multivitamin. monitor pulse ox. Call PCP in 24 hours for f/u. Chief Complaint     Chief Complaint   Patient presents with   • Cold Like Symptoms     Patient started with cough, sore throat, and ear pain that started 3 days ago. History of Present Illness       Sore Throat   This is a new problem. Episode onset: 3 days ago. The problem has been gradually worsening. There has been no fever. The pain is moderate. Associated symptoms include coughing and ear pain. Pertinent negatives include no abdominal pain, congestion, diarrhea, drooling, ear discharge, headaches, hoarse voice, neck pain, shortness of breath, stridor, swollen glands, trouble swallowing or vomiting. She has had exposure to strep. She has tried NSAIDs for the symptoms. The treatment provided mild relief. Review of Systems   Review of Systems   Constitutional: Negative for chills, diaphoresis, fatigue and fever. HENT: Positive for ear pain and sore throat. Negative for congestion, drooling, ear discharge, facial swelling, hoarse voice, mouth sores, postnasal drip, rhinorrhea, sinus pressure, sinus pain and trouble swallowing. Eyes: Negative for photophobia and visual disturbance. Respiratory: Positive for cough. Negative for chest tightness, shortness of breath and stridor. Cardiovascular: Negative for chest pain. Gastrointestinal: Negative for abdominal pain, diarrhea, nausea and vomiting. Genitourinary: Negative. Musculoskeletal: Positive for myalgias. Negative for arthralgias, back pain, joint swelling, neck pain and neck stiffness. Skin: Negative for rash. Neurological: Negative for dizziness, facial asymmetry, weakness, light-headedness, numbness and headaches.          Current Medications       Current Outpatient Medications:   •  amphetamine-dextroamphetamine (ADDERALL XR) 20 MG 24 hr capsule, take 1 capsule by mouth every morning, Disp: 30 capsule, Rfl: 0  •  norgestimate-ethinyl estradiol (Tri-Estarylla) 0.18/0.215/0.25 MG-35 MCG per tablet, Take 1 tablet by mouth daily, Disp: 84 tablet, Rfl: 0  •  busPIRone (BUSPAR) 10 mg tablet, Take 1 tablet (10 mg total) by mouth 3 (three) times a day (Patient not taking: Reported on 8/15/2023), Disp: 90 tablet, Rfl: 1  •  dicyclomine (BENTYL) 20 mg tablet, Take 1 tablet (20 mg total) by mouth every 6 (six) hours (Patient not taking: Reported on 8/15/2023), Disp: 60 tablet, Rfl: 3  •  EPINEPHrine (EPIPEN) 0.3 mg/0.3 mL SOAJ, Inject 0.3 mg into a muscle as needed (Patient not taking: Reported on 8/15/2023), Disp: , Rfl:   •  famotidine (PEPCID) 20 mg tablet, Take 1 tablet (20 mg total) by mouth 2 (two) times a day (Patient not taking: Reported on 8/15/2023), Disp: 60 tablet, Rfl: 5  •  fexofenadine (ALLEGRA) 180 MG tablet, Take 180 mg by mouth as needed (Patient not taking: Reported on 8/15/2023), Disp: , Rfl:     Current Allergies     Allergies as of 08/15/2023 - Reviewed 08/15/2023   Allergen Reaction Noted   • Mupirocin  03/18/2019   • Sulfa antibiotics Rash 12/19/2018            The following portions of the patient's history were reviewed and updated as appropriate: allergies, current medications, past family history, past medical history, past social history, past surgical history and problem list.     History reviewed. No pertinent past medical history. Past Surgical History:   Procedure Laterality Date   • TONSILECTOMY AND ADNOIDECTOMY     • TYMPANOSTOMY TUBE PLACEMENT      6 sets - last time as a child   • WISDOM TOOTH EXTRACTION         Family History   Problem Relation Age of Onset   • Fibroids Mother    • GI problems Father    • Seizures Sister    • Arthritis Maternal Grandmother          Medications have been verified. Objective   /69   Pulse 102   Temp 97.8 °F (36.6 °C) (Temporal)   Resp 18   Ht 5' 6" (1.676 m)   Wt 63.5 kg (140 lb)   LMP 08/07/2023 (Exact Date)   SpO2 97%   BMI 22.60 kg/m²   Patient's last menstrual period was 08/07/2023 (exact date). Physical Exam     Physical Exam  Constitutional:       General: She is not in acute distress. Appearance: She is well-developed. HENT:      Head: Normocephalic and atraumatic. Right Ear: Hearing, tympanic membrane, ear canal and external ear normal.      Left Ear: Hearing, tympanic membrane, ear canal and external ear normal.      Nose: Rhinorrhea present. Right Sinus: No maxillary sinus tenderness or frontal sinus tenderness. Left Sinus: No maxillary sinus tenderness or frontal sinus tenderness. Mouth/Throat:      Pharynx: Oropharynx is clear. Uvula midline. No oropharyngeal exudate or posterior oropharyngeal erythema. Tonsils: 0 on the right. 0 on the left. Cardiovascular:      Rate and Rhythm: Normal rate and regular rhythm. Heart sounds: Normal heart sounds, S1 normal and S2 normal.   Pulmonary:      Effort: Pulmonary effort is normal.      Breath sounds: Normal breath sounds. Lymphadenopathy:      Cervical: No cervical adenopathy. Skin:     General: Skin is warm and dry. Capillary Refill: Capillary refill takes less than 2 seconds. Neurological:      Mental Status: She is alert and oriented to person, place, and time.

## 2023-08-15 NOTE — PATIENT INSTRUCTIONS
Patient instructed to self quarantine. Advised to avoid nsaids. If you develop prolonged high fever, worsening or productive cough, shortness of breath, difficulty breathing, chest pain, or any new or concerning symptoms please proceed ER. Instructed patient to call ER prior to arrival make them aware she is being test for covid. Patient verbalizes understanding. Start vitamin c 1g twice daily,vitamin d3 2000IU daily, and multivitamin. monitor pulse ox. Call PCP in 24 hours for f/u.

## 2023-08-15 NOTE — LETTER
[facility] [address] [address]  Dept: [phone]    August 15, 2023    Patient: Robyn Vogel  YOB: 2000    Robyn Vogel was seen and evaluated at our [facility]. They may return to work on 8/16/2023. Upon return, they must then adhere to strict masking for an additional 5 days.     Sincerely,    Daryll Dakin

## 2023-08-17 LAB — BACTERIA THROAT CULT: NORMAL

## 2023-10-05 DIAGNOSIS — Z30.011 ENCOUNTER FOR PRESCRIPTION OF ORAL CONTRACEPTIVES: ICD-10-CM

## 2023-10-05 RX ORDER — NORGESTIMATE AND ETHINYL ESTRADIOL 7DAYSX3 28
1 KIT ORAL DAILY
Qty: 84 TABLET | Refills: 0 | Status: SHIPPED | OUTPATIENT
Start: 2023-10-05

## 2023-11-17 DIAGNOSIS — Z13.1 SCREENING FOR DIABETES MELLITUS: ICD-10-CM

## 2023-11-17 DIAGNOSIS — E53.8 B12 DEFICIENCY: ICD-10-CM

## 2023-11-17 DIAGNOSIS — Z13.220 SCREENING FOR HYPERLIPIDEMIA: ICD-10-CM

## 2023-11-17 DIAGNOSIS — Z00.00 ANNUAL PHYSICAL EXAM: ICD-10-CM

## 2023-11-17 DIAGNOSIS — R53.83 OTHER FATIGUE: ICD-10-CM

## 2023-11-17 DIAGNOSIS — Z13.29 SCREENING FOR THYROID DISORDER: ICD-10-CM

## 2023-11-17 DIAGNOSIS — E55.9 VITAMIN D DEFICIENCY: Primary | ICD-10-CM

## 2023-11-28 ENCOUNTER — APPOINTMENT (OUTPATIENT)
Dept: LAB | Facility: CLINIC | Age: 23
End: 2023-11-28
Payer: COMMERCIAL

## 2023-11-28 DIAGNOSIS — E53.8 B12 DEFICIENCY: ICD-10-CM

## 2023-11-28 DIAGNOSIS — Z13.29 SCREENING FOR THYROID DISORDER: ICD-10-CM

## 2023-11-28 DIAGNOSIS — E55.9 VITAMIN D DEFICIENCY: ICD-10-CM

## 2023-11-28 DIAGNOSIS — Z00.00 ANNUAL PHYSICAL EXAM: ICD-10-CM

## 2023-11-28 DIAGNOSIS — R53.83 OTHER FATIGUE: ICD-10-CM

## 2023-11-28 LAB
25(OH)D3 SERPL-MCNC: 55.3 NG/ML (ref 30–100)
ALBUMIN SERPL BCP-MCNC: 4.1 G/DL (ref 3.5–5)
ALP SERPL-CCNC: 54 U/L (ref 34–104)
ALT SERPL W P-5'-P-CCNC: 13 U/L (ref 7–52)
ANION GAP SERPL CALCULATED.3IONS-SCNC: 10 MMOL/L
AST SERPL W P-5'-P-CCNC: 19 U/L (ref 13–39)
BASOPHILS # BLD AUTO: 0.04 THOUSANDS/ÂΜL (ref 0–0.1)
BASOPHILS NFR BLD AUTO: 1 % (ref 0–1)
BILIRUB SERPL-MCNC: 0.29 MG/DL (ref 0.2–1)
BUN SERPL-MCNC: 12 MG/DL (ref 5–25)
CALCIUM SERPL-MCNC: 8.9 MG/DL (ref 8.4–10.2)
CHLORIDE SERPL-SCNC: 103 MMOL/L (ref 96–108)
CO2 SERPL-SCNC: 26 MMOL/L (ref 21–32)
CREAT SERPL-MCNC: 0.61 MG/DL (ref 0.6–1.3)
EOSINOPHIL # BLD AUTO: 0.19 THOUSAND/ÂΜL (ref 0–0.61)
EOSINOPHIL NFR BLD AUTO: 3 % (ref 0–6)
ERYTHROCYTE [DISTWIDTH] IN BLOOD BY AUTOMATED COUNT: 12.7 % (ref 11.6–15.1)
FERRITIN SERPL-MCNC: 23 NG/ML (ref 11–307)
FOLATE SERPL-MCNC: 18.8 NG/ML
GFR SERPL CREATININE-BSD FRML MDRD: 127 ML/MIN/1.73SQ M
GLUCOSE P FAST SERPL-MCNC: 79 MG/DL (ref 65–99)
HCT VFR BLD AUTO: 40.8 % (ref 34.8–46.1)
HGB BLD-MCNC: 13.6 G/DL (ref 11.5–15.4)
IMM GRANULOCYTES # BLD AUTO: 0.01 THOUSAND/UL (ref 0–0.2)
IMM GRANULOCYTES NFR BLD AUTO: 0 % (ref 0–2)
IRON SATN MFR SERPL: 21 % (ref 15–50)
IRON SERPL-MCNC: 91 UG/DL (ref 50–212)
LYMPHOCYTES # BLD AUTO: 3.97 THOUSANDS/ÂΜL (ref 0.6–4.47)
LYMPHOCYTES NFR BLD AUTO: 51 % (ref 14–44)
MCH RBC QN AUTO: 29.2 PG (ref 26.8–34.3)
MCHC RBC AUTO-ENTMCNC: 33.3 G/DL (ref 31.4–37.4)
MCV RBC AUTO: 88 FL (ref 82–98)
MONOCYTES # BLD AUTO: 0.64 THOUSAND/ÂΜL (ref 0.17–1.22)
MONOCYTES NFR BLD AUTO: 9 % (ref 4–12)
NEUTROPHILS # BLD AUTO: 2.69 THOUSANDS/ÂΜL (ref 1.85–7.62)
NEUTS SEG NFR BLD AUTO: 36 % (ref 43–75)
NRBC BLD AUTO-RTO: 0 /100 WBCS
PLATELET # BLD AUTO: 254 THOUSANDS/UL (ref 149–390)
PMV BLD AUTO: 12.1 FL (ref 8.9–12.7)
POTASSIUM SERPL-SCNC: 4.2 MMOL/L (ref 3.5–5.3)
PROT SERPL-MCNC: 7.1 G/DL (ref 6.4–8.4)
RBC # BLD AUTO: 4.65 MILLION/UL (ref 3.81–5.12)
SODIUM SERPL-SCNC: 139 MMOL/L (ref 135–147)
T4 FREE SERPL-MCNC: 0.67 NG/DL (ref 0.61–1.12)
TIBC SERPL-MCNC: 437 UG/DL (ref 250–450)
TSH SERPL DL<=0.05 MIU/L-ACNC: 2.05 UIU/ML (ref 0.45–4.5)
UIBC SERPL-MCNC: 346 UG/DL (ref 155–355)
VIT B12 SERPL-MCNC: 274 PG/ML (ref 180–914)
WBC # BLD AUTO: 7.54 THOUSAND/UL (ref 4.31–10.16)

## 2023-11-28 PROCEDURE — 82607 VITAMIN B-12: CPT

## 2023-11-28 PROCEDURE — 82728 ASSAY OF FERRITIN: CPT

## 2023-11-28 PROCEDURE — 36415 COLL VENOUS BLD VENIPUNCTURE: CPT

## 2023-11-28 PROCEDURE — 80053 COMPREHEN METABOLIC PANEL: CPT

## 2023-11-28 PROCEDURE — 83540 ASSAY OF IRON: CPT

## 2023-11-28 PROCEDURE — 82746 ASSAY OF FOLIC ACID SERUM: CPT

## 2023-11-28 PROCEDURE — 84443 ASSAY THYROID STIM HORMONE: CPT

## 2023-11-28 PROCEDURE — 84439 ASSAY OF FREE THYROXINE: CPT

## 2023-11-28 PROCEDURE — 83550 IRON BINDING TEST: CPT

## 2023-11-28 PROCEDURE — 82306 VITAMIN D 25 HYDROXY: CPT

## 2023-11-28 PROCEDURE — 85025 COMPLETE CBC W/AUTO DIFF WBC: CPT

## 2023-11-29 ENCOUNTER — TELEPHONE (OUTPATIENT)
Dept: FAMILY MEDICINE CLINIC | Facility: CLINIC | Age: 23
End: 2023-11-29

## 2023-11-29 NOTE — TELEPHONE ENCOUNTER
Patient called the office wanting to be seen today due to fatigue concerns. Spoke with one of the providers and due to no appointments being available we are unable to see patient today. Left message for pt to call the office back to inform.

## 2023-11-30 ENCOUNTER — OFFICE VISIT (OUTPATIENT)
Dept: URGENT CARE | Facility: CLINIC | Age: 23
End: 2023-11-30
Payer: COMMERCIAL

## 2023-11-30 VITALS
TEMPERATURE: 98.2 F | HEART RATE: 104 BPM | RESPIRATION RATE: 18 BRPM | OXYGEN SATURATION: 98 % | BODY MASS INDEX: 24.59 KG/M2 | SYSTOLIC BLOOD PRESSURE: 130 MMHG | HEIGHT: 66 IN | WEIGHT: 153 LBS | DIASTOLIC BLOOD PRESSURE: 68 MMHG

## 2023-11-30 DIAGNOSIS — R50.9 FEVER, UNSPECIFIED FEVER CAUSE: Primary | ICD-10-CM

## 2023-11-30 PROCEDURE — 87636 SARSCOV2 & INF A&B AMP PRB: CPT | Performed by: NURSE PRACTITIONER

## 2023-11-30 PROCEDURE — 99213 OFFICE O/P EST LOW 20 MIN: CPT | Performed by: NURSE PRACTITIONER

## 2023-11-30 NOTE — LETTER
November 30, 2023     Patient: Dm Canas   YOB: 2000   Date of Visit: 11/30/2023       To Whom it May Concern:    Edward Newman was seen in my clinic on 11/30/2023. She may return to work on 12/5/2023 . If you have any questions or concerns, please don't hesitate to call.          Sincerely,          PABLITO Payne        CC: No Recipients

## 2023-11-30 NOTE — LETTER
[facility] [place]  [address] [address]  Dept: [phone]    November 30, 2023    Patient: Myke Crawford  YOB: 2000    Myke Crawford was seen and evaluated at our [facility]. Please note if Covid and Flu tests are negative, they may return to work when fever free for 24 hours without the use of a fever reducing agent. If Covid or Flu test is positive, they may return to work on 12/5/2023, as this is 5 days from the onset of symptoms. Upon return, they must then adhere to strict masking for an additional 5 days.     Sincerely,    Oli Rowell

## 2023-11-30 NOTE — PROGRESS NOTES
[facility]        NAME: Linda Hutchinson is a 24 y.o. female  : 2000    MRN: [id number]  DATE: 2023  TIME: 4:51 PM    Assessment and Plan   Fever, unspecified fever cause [R50.9]  1. Fever, unspecified fever cause  Covid/Flu-Office Collect            Patient Instructions     Patient Instructions   Recommend tylenol and motrin. Rest and drink plenty of fluids. A cool mist humidifier and saline rinse can be helpful. If you develop a worsening cough, chest pain, shortness of breath, palpitations, coughing up blood, prolonged high fever, severe headache, dizziness, any new or concerning symptoms please return or proceed ER. Recommend following up with PCP in 3-5 days      Chief Complaint     Chief Complaint   Patient presents with    Fever     101.0 last night and body aches    Cough     Started with a cough today         History of Present Illness       Fever  This is a new problem. The current episode started yesterday. The problem occurs constantly. The problem has been waxing and waning. Associated symptoms include congestion, coughing and myalgias. Pertinent negatives include no abdominal pain, arthralgias, chest pain, chills, diaphoresis, fatigue, fever, headaches, joint swelling, neck pain, numbness, rash, sore throat, swollen glands, urinary symptoms, vertigo, visual change, vomiting or weakness. Nothing aggravates the symptoms. She has tried acetaminophen for the symptoms. The treatment provided mild relief. Cough  Associated symptoms include myalgias and rhinorrhea. Pertinent negatives include no chest pain, chills, ear pain, fever, headaches, postnasal drip, rash, sore throat, shortness of breath or wheezing. Review of Systems   Review of Systems   Constitutional:  Negative for chills, diaphoresis, fatigue and fever. HENT:  Positive for congestion and rhinorrhea.  Negative for ear pain, facial swelling, postnasal drip, sinus pressure, sinus pain, sore throat, tinnitus and trouble swallowing. Eyes: Negative. Respiratory:  Positive for cough. Negative for chest tightness, shortness of breath, wheezing and stridor. Cardiovascular:  Negative for chest pain and palpitations. Gastrointestinal: Negative. Negative for abdominal pain and vomiting. Musculoskeletal:  Positive for myalgias. Negative for arthralgias, back pain, joint swelling, neck pain and neck stiffness. Skin:  Negative for rash. Neurological:  Negative for dizziness, vertigo, facial asymmetry, weakness, light-headedness, numbness and headaches.          Current Medications       Current Outpatient Medications:     amphetamine-dextroamphetamine (ADDERALL XR) 20 MG 24 hr capsule, take 1 capsule by mouth every morning, Disp: 30 capsule, Rfl: 0    EPINEPHrine (EPIPEN) 0.3 mg/0.3 mL SOAJ, Inject 0.3 mg into a muscle as needed, Disp: , Rfl:     fexofenadine (ALLEGRA) 180 MG tablet, Take 180 mg by mouth as needed, Disp: , Rfl:     norgestimate-ethinyl estradiol (ORTHO TRI-CYCLEN,TRINESSA) 0.18/0.215/0.25 MG-35 MCG per tablet, TAKE 1 TABLET BY MOUTH EVERY DAY, Disp: 84 tablet, Rfl: 0    busPIRone (BUSPAR) 10 mg tablet, Take 1 tablet (10 mg total) by mouth 3 (three) times a day (Patient not taking: Reported on 8/15/2023), Disp: 90 tablet, Rfl: 1    dicyclomine (BENTYL) 20 mg tablet, Take 1 tablet (20 mg total) by mouth every 6 (six) hours (Patient not taking: Reported on 8/15/2023), Disp: 60 tablet, Rfl: 3    famotidine (PEPCID) 20 mg tablet, Take 1 tablet (20 mg total) by mouth 2 (two) times a day (Patient not taking: Reported on 8/15/2023), Disp: 60 tablet, Rfl: 5    Current Allergies     Allergies as of 11/30/2023 - Reviewed 11/30/2023   Allergen Reaction Noted    Mupirocin  03/18/2019    Sulfa antibiotics Rash 12/19/2018            The following portions of the patient's history were reviewed and updated as appropriate: allergies, current medications, past family history, past medical history, past social history, past surgical history and problem list.     No past medical history on file. Past Surgical History:   Procedure Laterality Date    TONSILECTOMY AND ADNOIDECTOMY      TYMPANOSTOMY TUBE PLACEMENT      6 sets - last time as a child    WISDOM TOOTH EXTRACTION         Family History   Problem Relation Age of Onset    Fibroids Mother     GI problems Father     Seizures Sister     Arthritis Maternal Grandmother          Medications have been verified. Objective   /68   Pulse 104   Temp 98.2 °F (36.8 °C)   Resp 18   Ht 5' 6" (1.676 m)   Wt 69.4 kg (153 lb)   SpO2 98%   BMI 24.69 kg/m²   No LMP recorded. Physical Exam     Physical Exam  Constitutional:       General: She is not in acute distress. Appearance: She is well-developed. She is not diaphoretic. HENT:      Head: Normocephalic and atraumatic. Right Ear: Hearing, tympanic membrane, ear canal and external ear normal.      Left Ear: Hearing, tympanic membrane, ear canal and external ear normal.      Nose: Congestion present. Right Sinus: No maxillary sinus tenderness or frontal sinus tenderness. Left Sinus: No maxillary sinus tenderness or frontal sinus tenderness. Mouth/Throat:      Pharynx: Oropharynx is clear. Uvula midline. Tonsils: 0 on the right. 0 on the left. Cardiovascular:      Rate and Rhythm: Normal rate and regular rhythm. Heart sounds: Normal heart sounds, S1 normal and S2 normal.   Pulmonary:      Effort: Pulmonary effort is normal.      Breath sounds: Normal breath sounds and air entry. Lymphadenopathy:      Cervical: No cervical adenopathy. Skin:     General: Skin is warm and dry. Capillary Refill: Capillary refill takes less than 2 seconds. Neurological:      Mental Status: She is alert and oriented to person, place, and time.

## 2023-11-30 NOTE — PATIENT INSTRUCTIONS
Recommend tylenol and motrin. Rest and drink plenty of fluids. A cool mist humidifier and saline rinse can be helpful. If you develop a worsening cough, chest pain, shortness of breath, palpitations, coughing up blood, prolonged high fever, severe headache, dizziness, any new or concerning symptoms please return or proceed ER.   Recommend following up with PCP in 3-5 days

## 2023-12-01 ENCOUNTER — TELEPHONE (OUTPATIENT)
Dept: INTERNAL MEDICINE CLINIC | Facility: CLINIC | Age: 23
End: 2023-12-01

## 2023-12-01 ENCOUNTER — OFFICE VISIT (OUTPATIENT)
Dept: URGENT CARE | Facility: CLINIC | Age: 23
End: 2023-12-01
Payer: COMMERCIAL

## 2023-12-01 VITALS
RESPIRATION RATE: 18 BRPM | SYSTOLIC BLOOD PRESSURE: 134 MMHG | DIASTOLIC BLOOD PRESSURE: 64 MMHG | WEIGHT: 153 LBS | HEART RATE: 100 BPM | OXYGEN SATURATION: 97 % | TEMPERATURE: 98.3 F | BODY MASS INDEX: 24.59 KG/M2 | HEIGHT: 66 IN

## 2023-12-01 DIAGNOSIS — J06.9 ACUTE URI: Primary | ICD-10-CM

## 2023-12-01 DIAGNOSIS — F90.9 ADULT ADHD: ICD-10-CM

## 2023-12-01 PROCEDURE — 99213 OFFICE O/P EST LOW 20 MIN: CPT | Performed by: NURSE PRACTITIONER

## 2023-12-01 RX ORDER — DEXTROAMPHETAMINE SACCHARATE, AMPHETAMINE ASPARTATE MONOHYDRATE, DEXTROAMPHETAMINE SULFATE AND AMPHETAMINE SULFATE 5; 5; 5; 5 MG/1; MG/1; MG/1; MG/1
20 CAPSULE, EXTENDED RELEASE ORAL EVERY MORNING
Qty: 30 CAPSULE | Refills: 0 | Status: SHIPPED | OUTPATIENT
Start: 2023-12-01

## 2023-12-01 RX ORDER — BENZONATATE 100 MG/1
100 CAPSULE ORAL 3 TIMES DAILY PRN
Qty: 20 CAPSULE | Refills: 0 | Status: SHIPPED | OUTPATIENT
Start: 2023-12-01

## 2023-12-01 RX ORDER — PREDNISONE 20 MG/1
20 TABLET ORAL 2 TIMES DAILY WITH MEALS
Qty: 10 TABLET | Refills: 0 | Status: SHIPPED | OUTPATIENT
Start: 2023-12-01 | End: 2023-12-04 | Stop reason: ALTCHOICE

## 2023-12-01 RX ORDER — AZITHROMYCIN 250 MG/1
TABLET, FILM COATED ORAL
Qty: 6 TABLET | Refills: 0 | Status: SHIPPED | OUTPATIENT
Start: 2023-12-01 | End: 2023-12-04 | Stop reason: ALTCHOICE

## 2023-12-01 NOTE — PROGRESS NOTES
[address] [facility]        NAME: Jobie Mohs is a 24 y.o. female  : 2000    MRN: [id number]  DATE: 2023  TIME: 3:13 PM    Assessment and Plan   Acute URI [J06.9]  1. Acute URI  azithromycin (ZITHROMAX) 250 mg tablet    predniSONE 20 mg tablet    benzonatate (TESSALON PERLES) 100 mg capsule            Patient Instructions     Patient Instructions   Take medication as directed. Rest and drink plenty of fluids. A cool mist humidifier can be helpful. If you develop a worsening cough, chest pain, shortness of breath, palpitations, coughing up blood, prolonged high fever, any new or concerning symptoms please return or proceed ER. Recommend following up with PCP in 3-5 days      Chief Complaint     Chief Complaint   Patient presents with    Cough     Was here last night and thinks here cough is getting worse. Feels like it is going into her chest         History of Present Illness       Cough  This is a new problem. The current episode started in the past 7 days. The problem has been gradually worsening. The problem occurs every few minutes. The cough is Non-productive. Associated symptoms include myalgias, nasal congestion, rhinorrhea and wheezing. Pertinent negatives include no chest pain, chills, ear pain, fever, headaches, postnasal drip, rash, sore throat or shortness of breath. Nothing aggravates the symptoms. She has tried OTC cough suppressant for the symptoms. The treatment provided mild relief. There is no history of asthma or COPD. Review of Systems   Review of Systems   Constitutional:  Negative for chills, diaphoresis, fatigue and fever. HENT:  Positive for congestion and rhinorrhea. Negative for ear pain, facial swelling, postnasal drip, sinus pressure, sinus pain, sore throat, tinnitus and trouble swallowing. Eyes: Negative. Respiratory:  Positive for cough, chest tightness and wheezing. Negative for shortness of breath and stridor.     Cardiovascular:  Negative for chest pain and palpitations. Gastrointestinal: Negative. Musculoskeletal:  Positive for myalgias. Negative for arthralgias, back pain, joint swelling, neck pain and neck stiffness. Skin:  Negative for rash. Neurological:  Negative for dizziness, facial asymmetry, weakness, light-headedness, numbness and headaches.          Current Medications       Current Outpatient Medications:     amphetamine-dextroamphetamine (ADDERALL XR) 20 MG 24 hr capsule, Take 1 capsule (20 mg total) by mouth every morning, Disp: 30 capsule, Rfl: 0    azithromycin (ZITHROMAX) 250 mg tablet, Take 2 tablets today then 1 tablet daily x 4 days, Disp: 6 tablet, Rfl: 0    benzonatate (TESSALON PERLES) 100 mg capsule, Take 1 capsule (100 mg total) by mouth 3 (three) times a day as needed for cough, Disp: 20 capsule, Rfl: 0    EPINEPHrine (EPIPEN) 0.3 mg/0.3 mL SOAJ, Inject 0.3 mg into a muscle as needed, Disp: , Rfl:     fexofenadine (ALLEGRA) 180 MG tablet, Take 180 mg by mouth as needed, Disp: , Rfl:     norgestimate-ethinyl estradiol (ORTHO TRI-CYCLEN,TRINESSA) 0.18/0.215/0.25 MG-35 MCG per tablet, TAKE 1 TABLET BY MOUTH EVERY DAY, Disp: 84 tablet, Rfl: 0    predniSONE 20 mg tablet, Take 1 tablet (20 mg total) by mouth 2 (two) times a day with meals for 5 days, Disp: 10 tablet, Rfl: 0    busPIRone (BUSPAR) 10 mg tablet, Take 1 tablet (10 mg total) by mouth 3 (three) times a day (Patient not taking: Reported on 8/15/2023), Disp: 90 tablet, Rfl: 1    dicyclomine (BENTYL) 20 mg tablet, Take 1 tablet (20 mg total) by mouth every 6 (six) hours (Patient not taking: Reported on 8/15/2023), Disp: 60 tablet, Rfl: 3    famotidine (PEPCID) 20 mg tablet, Take 1 tablet (20 mg total) by mouth 2 (two) times a day (Patient not taking: Reported on 8/15/2023), Disp: 60 tablet, Rfl: 5    Current Allergies     Allergies as of 12/01/2023 - Reviewed 12/01/2023   Allergen Reaction Noted    Mupirocin  03/18/2019    Sulfa antibiotics Rash 12/19/2018 The following portions of the patient's history were reviewed and updated as appropriate: allergies, current medications, past family history, past medical history, past social history, past surgical history and problem list.     No past medical history on file. Past Surgical History:   Procedure Laterality Date    TONSILECTOMY AND ADNOIDECTOMY      TYMPANOSTOMY TUBE PLACEMENT      6 sets - last time as a child    WISDOM TOOTH EXTRACTION         Family History   Problem Relation Age of Onset    Fibroids Mother     GI problems Father     Seizures Sister     Arthritis Maternal Grandmother          Medications have been verified. Objective   /64   Pulse 100   Temp 98.3 °F (36.8 °C)   Resp 18   Ht 5' 6" (1.676 m)   Wt 69.4 kg (153 lb)   SpO2 97%   BMI 24.69 kg/m²   No LMP recorded. Physical Exam     Physical Exam  Constitutional:       General: She is not in acute distress. Appearance: She is well-developed. She is not diaphoretic. HENT:      Head: Normocephalic and atraumatic. Right Ear: Hearing, tympanic membrane, ear canal and external ear normal.      Left Ear: Hearing, tympanic membrane, ear canal and external ear normal.      Nose: Congestion and rhinorrhea present. No mucosal edema. Right Sinus: No maxillary sinus tenderness or frontal sinus tenderness. Left Sinus: No maxillary sinus tenderness or frontal sinus tenderness. Mouth/Throat:      Pharynx: Uvula midline. Cardiovascular:      Rate and Rhythm: Normal rate and regular rhythm. Heart sounds: Normal heart sounds, S1 normal and S2 normal.   Pulmonary:      Effort: Pulmonary effort is normal.      Breath sounds: Normal air entry. Examination of the right-lower field reveals rhonchi. Examination of the left-lower field reveals rhonchi. Rhonchi present. Lymphadenopathy:      Cervical: No cervical adenopathy. Skin:     General: Skin is warm and dry.       Capillary Refill: Capillary refill takes less than 2 seconds. Neurological:      Mental Status: She is alert and oriented to person, place, and time.

## 2023-12-01 NOTE — TELEPHONE ENCOUNTER
[facility]  Covered: Retail, Mail Order, SpecialtyUnknown: Long-Term Care                Member ID: [id number] BIN: [id number]  : 2000   Group ID: [id number] PCN: [id number]  Legal sex: F   Group name: [employer] [employer] [facility]  Address: [id number] [place] [address]   Use As Primary Coverage

## 2023-12-01 NOTE — LETTER
December 1, 2023     Patient: Valentino Donna   YOB: 2000   Date of Visit: 12/1/2023       To Whom it May Concern:    Concetta Mclaughlin was seen in my clinic on 12/1/2023. She may return to work on 12/2/2023 . If you have any questions or concerns, please don't hesitate to call.          Sincerely,          PABLITO Hudson        CC: No Recipients

## 2023-12-04 ENCOUNTER — OFFICE VISIT (OUTPATIENT)
Dept: URGENT CARE | Facility: CLINIC | Age: 23
End: 2023-12-04
Payer: COMMERCIAL

## 2023-12-04 ENCOUNTER — APPOINTMENT (OUTPATIENT)
Dept: RADIOLOGY | Facility: CLINIC | Age: 23
End: 2023-12-04
Payer: COMMERCIAL

## 2023-12-04 VITALS
TEMPERATURE: 98.1 F | OXYGEN SATURATION: 98 % | HEART RATE: 93 BPM | SYSTOLIC BLOOD PRESSURE: 129 MMHG | RESPIRATION RATE: 18 BRPM | DIASTOLIC BLOOD PRESSURE: 76 MMHG

## 2023-12-04 DIAGNOSIS — J20.9 ACUTE BRONCHITIS, UNSPECIFIED ORGANISM: ICD-10-CM

## 2023-12-04 DIAGNOSIS — J20.9 ACUTE BRONCHITIS, UNSPECIFIED ORGANISM: Primary | ICD-10-CM

## 2023-12-04 LAB
SARS-COV-2 AG UPPER RESP QL IA: NEGATIVE
VALID CONTROL: NORMAL

## 2023-12-04 PROCEDURE — 99213 OFFICE O/P EST LOW 20 MIN: CPT | Performed by: NURSE PRACTITIONER

## 2023-12-04 PROCEDURE — 71046 X-RAY EXAM CHEST 2 VIEWS: CPT

## 2023-12-04 PROCEDURE — 87811 SARS-COV-2 COVID19 W/OPTIC: CPT | Performed by: NURSE PRACTITIONER

## 2023-12-04 RX ORDER — LEVOFLOXACIN 500 MG/1
500 TABLET, FILM COATED ORAL EVERY 24 HOURS
Qty: 10 TABLET | Refills: 0 | Status: SHIPPED | OUTPATIENT
Start: 2023-12-04 | End: 2023-12-14

## 2023-12-04 RX ORDER — ALBUTEROL SULFATE 90 UG/1
2 AEROSOL, METERED RESPIRATORY (INHALATION) EVERY 4 HOURS PRN
Qty: 8.5 G | Refills: 1 | Status: SHIPPED | OUTPATIENT
Start: 2023-12-04

## 2023-12-04 RX ORDER — PREDNISONE 10 MG/1
TABLET ORAL
Qty: 30 TABLET | Refills: 0 | Status: SHIPPED | OUTPATIENT
Start: 2023-12-04

## 2023-12-04 NOTE — LETTER
December 4, 2023     Patient: Maribel Soliman   YOB: 2000   Date of Visit: 12/4/2023       To Whom It May Concern: It is my medical opinion that Gi Rico  may return to work and school 12/7. Please excuse for time missed due to acute illness . If you have any questions or concerns, please don't hesitate to call.          Sincerely,        PABLITO Potter    CC: No Recipients

## 2023-12-04 NOTE — PATIENT INSTRUCTIONS
Acute Bronchitis   AMBULATORY CARE:   Acute bronchitis  is swelling and irritation in your lungs. It is usually caused by a virus and most often happens in the winter. Bronchitis may also be caused by bacteria or by a chemical irritant, such as smoke. Common symptoms:   Cough that lasts up to 3 weeks    Runny or stuffy nose    Hoarseness, sore throat    Fever    Feeling more tired than usual, and body aches    Wheezing or pain when you breathe or cough    Seek care immediately if:   You cough up blood. Your lips or fingernails turn blue. You feel like you are not getting enough air when you breathe. Call your doctor if:   Your symptoms do not go away or get worse, even after treatment. Your cough does not get better within 4 weeks. You have questions or concerns about your condition or care. Medicines: You may need any of the following:  Cough suppressants  decrease your urge to cough. Decongestants  help loosen mucus in your lungs and make it easier to cough up. This can help you breathe easier. Inhalers  may be given. Your healthcare provider may give you one or more inhalers to help you breathe easier and cough less. An inhaler gives you medicine to open your airways. Ask your healthcare provider to show you how to use your inhaler correctly. Antiviral medicine  treats infections caused by a virus. Antibiotics  may be given if your bronchitis is caused by bacteria or if you have lung condition. Acetaminophen  decreases pain and fever. It is available without a doctor's order. Ask how much to take and how often to take it. Follow directions. Read the labels of all other medicines you are using to see if they also contain acetaminophen, or ask your doctor or pharmacist. Acetaminophen can cause liver damage if not taken correctly. NSAIDs  help decrease swelling and pain or fever. This medicine is available with or without a doctor's order.  NSAIDs can cause stomach bleeding or kidney problems in certain people. If you take blood thinner medicine, always ask your healthcare provider if NSAIDs are safe for you. Always read the medicine label and follow directions. Self-care:   Drink liquids as directed. You may need to drink more liquids than usual to stay hydrated. Ask how much liquid to drink each day and which liquids are best for you. Use a cool mist humidifier. This increases air moisture in your home. This may make it easier for you to breathe and help decrease your cough. Get more rest.  Rest helps your body to heal. Slowly start to do more each day. Rest when you feel it is needed. Prevent acute bronchitis:       Ask about vaccines you may need. Get a flu vaccine each year as soon as recommended, usually in September or October. Ask your healthcare provider if you should also get a pneumonia or COVID-19 vaccine. Your healthcare provider can tell you if you should also get other vaccines, and when to get them. Prevent the spread of germs. You can decrease your risk for acute bronchitis and other illnesses by doing the following:    Wash your hands often with soap and water. Carry germ-killing hand lotion or gel with you. You can use the lotion or gel to clean your hands when soap and water are not available. Do not touch your eyes, nose, or mouth unless you have washed your hands first.    Always cover your mouth when you cough to prevent the spread of germs. It is best to cough into a tissue or your shirt sleeve instead of into your hand. Ask those around you to cover their mouths when they cough. Try to avoid people who have a cold or the flu. If you are sick, stay away from others as much as possible. Avoid irritants in the air. Avoid chemicals, fumes, and dust. Wear a face mask if you must work around dust or fumes. Stay inside on days when air pollution levels are high. If you have allergies, stay inside when pollen counts are high.  Do not use aerosol products, such as spray-on deodorant, bug spray, and hair spray. Do not smoke or be around others who are smoking. Nicotine and other chemicals in cigarettes and cigars can cause lung damage. Ask your healthcare provider for information if you currently smoke and need help to quit. E-cigarettes or smokeless tobacco still contain nicotine. Talk to your healthcare provider before you use these products. Follow up with your doctor as directed:  Write down questions you have so you will remember to ask them during your follow-up visits. © Copyright Susana Saxena 2023 Information is for End User's use only and may not be sold, redistributed or otherwise used for commercial purposes. The above information is an  only. It is not intended as medical advice for individual conditions or treatments. Talk to your doctor, nurse or pharmacist before following any medical regimen to see if it is safe and effective for you.

## 2023-12-04 NOTE — PROGRESS NOTES
[facility]        NAME: Josseline Rangel is a 24 y.o. female  : 2000    MRN: [id number]  DATE: 2023  TIME: 1:03 PM      Assessment and Plan     Acute bronchitis, unspecified organism [J20.9]  1. Acute bronchitis, unspecified organism  Poct Covid 19 Rapid Antigen Test    XR chest pa & lateral    levofloxacin (LEVAQUIN) 500 mg tablet    predniSONE 10 mg tablet    albuterol (ProAir HFA) 90 mcg/act inhaler            Patient Instructions     Patient Instructions   Acute Bronchitis   AMBULATORY CARE:   Acute bronchitis  is swelling and irritation in your lungs. It is usually caused by a virus and most often happens in the winter. Bronchitis may also be caused by bacteria or by a chemical irritant, such as smoke. Common symptoms:   Cough that lasts up to 3 weeks    Runny or stuffy nose    Hoarseness, sore throat    Fever    Feeling more tired than usual, and body aches    Wheezing or pain when you breathe or cough    Seek care immediately if:   You cough up blood. Your lips or fingernails turn blue. You feel like you are not getting enough air when you breathe. Call your doctor if:   Your symptoms do not go away or get worse, even after treatment. Your cough does not get better within 4 weeks. You have questions or concerns about your condition or care. Medicines: You may need any of the following:  Cough suppressants  decrease your urge to cough. Decongestants  help loosen mucus in your lungs and make it easier to cough up. This can help you breathe easier. Inhalers  may be given. Your healthcare provider may give you one or more inhalers to help you breathe easier and cough less. An inhaler gives you medicine to open your airways. Ask your healthcare provider to show you how to use your inhaler correctly. Antiviral medicine  treats infections caused by a virus.     Antibiotics  may be given if your bronchitis is caused by bacteria or if you have lung condition. Acetaminophen  decreases pain and fever. It is available without a doctor's order. Ask how much to take and how often to take it. Follow directions. Read the labels of all other medicines you are using to see if they also contain acetaminophen, or ask your doctor or pharmacist. Acetaminophen can cause liver damage if not taken correctly. NSAIDs  help decrease swelling and pain or fever. This medicine is available with or without a doctor's order. NSAIDs can cause stomach bleeding or kidney problems in certain people. If you take blood thinner medicine, always ask your healthcare provider if NSAIDs are safe for you. Always read the medicine label and follow directions. Self-care:   Drink liquids as directed. You may need to drink more liquids than usual to stay hydrated. Ask how much liquid to drink each day and which liquids are best for you. Use a cool mist humidifier. This increases air moisture in your home. This may make it easier for you to breathe and help decrease your cough. Get more rest.  Rest helps your body to heal. Slowly start to do more each day. Rest when you feel it is needed. Prevent acute bronchitis:       Ask about vaccines you may need. Get a flu vaccine each year as soon as recommended, usually in September or October. Ask your healthcare provider if you should also get a pneumonia or COVID-19 vaccine. Your healthcare provider can tell you if you should also get other vaccines, and when to get them. Prevent the spread of germs. You can decrease your risk for acute bronchitis and other illnesses by doing the following:    Wash your hands often with soap and water. Carry germ-killing hand lotion or gel with you. You can use the lotion or gel to clean your hands when soap and water are not available. Do not touch your eyes, nose, or mouth unless you have washed your hands first.    Always cover your mouth when you cough to prevent the spread of germs. It is best to cough into a tissue or your shirt sleeve instead of into your hand. Ask those around you to cover their mouths when they cough. Try to avoid people who have a cold or the flu. If you are sick, stay away from others as much as possible. Avoid irritants in the air. Avoid chemicals, fumes, and dust. Wear a face mask if you must work around dust or fumes. Stay inside on days when air pollution levels are high. If you have allergies, stay inside when pollen counts are high. Do not use aerosol products, such as spray-on deodorant, bug spray, and hair spray. Do not smoke or be around others who are smoking. Nicotine and other chemicals in cigarettes and cigars can cause lung damage. Ask your healthcare provider for information if you currently smoke and need help to quit. E-cigarettes or smokeless tobacco still contain nicotine. Talk to your healthcare provider before you use these products. Follow up with your doctor as directed:  Write down questions you have so you will remember to ask them during your follow-up visits. © Copyright Mare Mo 2023 Information is for End User's use only and may not be sold, redistributed or otherwise used for commercial purposes. The above information is an  only. It is not intended as medical advice for individual conditions or treatments. Talk to your doctor, nurse or pharmacist before following any medical regimen to see if it is safe and effective for you. Follow up with PCP in 3-5 days. Proceed to  ER if symptoms worsen. Chief Complaint     Chief Complaint   Patient presents with    Cold Like Symptoms     Cough, chest hurts, body aches since Thursday, was just dx with bronchitis, reports taste and smell on Saturday, cough is worsening, prescribed medication not helping          History of Present Illness     Patient had onset of symptoms last week. Seen here Fri and dx bronchitis.   On day 4 azithromycin and prednisone--symptoms worsening. Notes loss of taste and smell on Sat but also increased nasal congestion then. Tested negative for covid twice at home and again here today. Review of Systems     Review of Systems   Constitutional:  Positive for fatigue. HENT:  Positive for congestion. Respiratory:  Positive for cough, chest tightness, shortness of breath and wheezing. Musculoskeletal:  Positive for myalgias. All other systems reviewed and are negative.         Current Medications       Current Outpatient Medications:     albuterol (ProAir HFA) 90 mcg/act inhaler, Inhale 2 puffs every 4 (four) hours as needed for wheezing or shortness of breath, Disp: 8.5 g, Rfl: 1    levofloxacin (LEVAQUIN) 500 mg tablet, Take 1 tablet (500 mg total) by mouth every 24 hours for 10 days, Disp: 10 tablet, Rfl: 0    predniSONE 10 mg tablet, 5 tabs daily x 2 days, 4 tabs daily x 2 days, 3 tabs daily x 2 days, 2 tabs daily x 2 days, 1 tab daily x 2 days, Disp: 30 tablet, Rfl: 0    amphetamine-dextroamphetamine (ADDERALL XR) 20 MG 24 hr capsule, Take 1 capsule (20 mg total) by mouth every morning, Disp: 30 capsule, Rfl: 0    benzonatate (TESSALON PERLES) 100 mg capsule, Take 1 capsule (100 mg total) by mouth 3 (three) times a day as needed for cough, Disp: 20 capsule, Rfl: 0    busPIRone (BUSPAR) 10 mg tablet, Take 1 tablet (10 mg total) by mouth 3 (three) times a day (Patient not taking: Reported on 8/15/2023), Disp: 90 tablet, Rfl: 1    dicyclomine (BENTYL) 20 mg tablet, Take 1 tablet (20 mg total) by mouth every 6 (six) hours (Patient not taking: Reported on 8/15/2023), Disp: 60 tablet, Rfl: 3    EPINEPHrine (EPIPEN) 0.3 mg/0.3 mL SOAJ, Inject 0.3 mg into a muscle as needed, Disp: , Rfl:     famotidine (PEPCID) 20 mg tablet, Take 1 tablet (20 mg total) by mouth 2 (two) times a day (Patient not taking: Reported on 8/15/2023), Disp: 60 tablet, Rfl: 5    fexofenadine (ALLEGRA) 180 MG tablet, Take 180 mg by mouth as needed, Disp: , Rfl: norgestimate-ethinyl estradiol (ORTHO TRI-CYCLEN,TRINESSA) 0.18/0.215/0.25 MG-35 MCG per tablet, TAKE 1 TABLET BY MOUTH EVERY DAY, Disp: 84 tablet, Rfl: 0    Current Allergies     Allergies as of 12/04/2023 - Reviewed 12/04/2023   Allergen Reaction Noted    Mupirocin  03/18/2019    Sulfa antibiotics Rash 12/19/2018              The following portions of the patient's history were reviewed and updated as appropriate: allergies, current medications, past family history, past medical history, past social history, past surgical history and problem list.     History reviewed. No pertinent past medical history. Past Surgical History:   Procedure Laterality Date    TONSILECTOMY AND ADNOIDECTOMY      TYMPANOSTOMY TUBE PLACEMENT      6 sets - last time as a child    WISDOM TOOTH EXTRACTION         Family History   Problem Relation Age of Onset    Fibroids Mother     GI problems Father     Seizures Sister     Arthritis Maternal Grandmother          Medications have been verified. Objective     /76   Pulse 93   Temp 98.1 °F (36.7 °C)   Resp 18   SpO2 98%   No LMP recorded. Physical Exam     Physical Exam  Vitals and nursing note reviewed. Constitutional:       General: She is not in acute distress. Appearance: Normal appearance. She is well-developed. She is ill-appearing. She is not toxic-appearing or diaphoretic. HENT:      Head: Normocephalic and atraumatic. Nose: Congestion present. Eyes:      Pupils: Pupils are equal, round, and reactive to light. Cardiovascular:      Rate and Rhythm: Normal rate and regular rhythm. Heart sounds: Normal heart sounds. No murmur heard. No friction rub. No gallop. Pulmonary:      Effort: Pulmonary effort is normal. No respiratory distress. Breath sounds: No stridor. Wheezing (mild scattered exp wheezes; good air movement) present. No rhonchi or rales. Chest:      Chest wall: Tenderness (generalized) present.    Abdominal: General: There is no distension. Palpations: Abdomen is soft. Musculoskeletal:         General: Normal range of motion. Cervical back: Normal range of motion and neck supple. Skin:     General: Skin is warm and dry. Capillary Refill: Capillary refill takes less than 2 seconds. Neurological:      General: No focal deficit present. Mental Status: She is alert and oriented to person, place, and time. Psychiatric:         Mood and Affect: Mood and affect normal.         Behavior: Behavior normal. Behavior is cooperative. Thought Content:  Thought content normal.         Judgment: Judgment normal.

## 2023-12-28 ENCOUNTER — TELEPHONE (OUTPATIENT)
Dept: OBGYN CLINIC | Facility: CLINIC | Age: 23
End: 2023-12-28

## 2023-12-28 DIAGNOSIS — Z30.011 ENCOUNTER FOR PRESCRIPTION OF ORAL CONTRACEPTIVES: ICD-10-CM

## 2023-12-28 DIAGNOSIS — F90.9 ADULT ADHD: ICD-10-CM

## 2023-12-28 RX ORDER — NORGESTIMATE AND ETHINYL ESTRADIOL 7DAYSX3 28
1 KIT ORAL DAILY
Qty: 84 TABLET | Refills: 0 | Status: SHIPPED | OUTPATIENT
Start: 2023-12-28 | End: 2023-12-29

## 2023-12-28 NOTE — TELEPHONE ENCOUNTER
This will be the last refill I can provide as this is the third time she has rescheduled her annual and has not been in for annual since 4/2022. Please stress the importance of her keeping this appointment

## 2023-12-28 NOTE — TELEPHONE ENCOUNTER
Patient called in to schedule her annual exam. Annual was scheduled for first available. Patient would like to know if she can get her birth control refilled?

## 2023-12-29 ENCOUNTER — TELEPHONE (OUTPATIENT)
Dept: OBGYN CLINIC | Facility: CLINIC | Age: 23
End: 2023-12-29

## 2023-12-29 DIAGNOSIS — Z30.011 ENCOUNTER FOR PRESCRIPTION OF ORAL CONTRACEPTIVES: Primary | ICD-10-CM

## 2023-12-29 RX ORDER — NORGESTIMATE AND ETHINYL ESTRADIOL 7DAYSX3 28
1 KIT ORAL DAILY
Qty: 84 TABLET | Refills: 0 | Status: SHIPPED | OUTPATIENT
Start: 2023-12-29 | End: 2024-03-22

## 2023-12-29 RX ORDER — DEXTROAMPHETAMINE SACCHARATE, AMPHETAMINE ASPARTATE MONOHYDRATE, DEXTROAMPHETAMINE SULFATE AND AMPHETAMINE SULFATE 5; 5; 5; 5 MG/1; MG/1; MG/1; MG/1
20 CAPSULE, EXTENDED RELEASE ORAL EVERY MORNING
Qty: 30 CAPSULE | Refills: 0 | Status: SHIPPED | OUTPATIENT
Start: 2023-12-29

## 2023-12-29 NOTE — TELEPHONE ENCOUNTER
Patient needs birth control script sent to [facility] in [place] 90 day supply. Patient had script sent to [facility] in [place] but they did not have name brand birth control and did not fill for 90 day supply. Patient needs medication today .

## 2024-01-16 PROBLEM — Z13.1 SCREENING FOR DIABETES MELLITUS: Status: RESOLVED | Noted: 2023-11-17 | Resolved: 2024-01-16

## 2024-01-16 PROBLEM — Z13.220 SCREENING FOR HYPERLIPIDEMIA: Status: RESOLVED | Noted: 2022-11-04 | Resolved: 2024-01-16

## 2024-01-16 PROBLEM — Z13.29 SCREENING FOR THYROID DISORDER: Status: RESOLVED | Noted: 2023-11-17 | Resolved: 2024-01-16

## 2024-02-01 NOTE — PROGRESS NOTES
Patient presents for a routine annual visit  Last Pap Smear- 2020 -/-  Pap today   HPV vaccine- not completed   LMP- 24  Birth control- pill - needs refill    nonsmoker  Currently sexually active - one partner   STD testing - declines   No family history of uterine, ovarian, cervical or breast cancer  No concerns/questions for today's visit

## 2024-02-02 ENCOUNTER — ANNUAL EXAM (OUTPATIENT)
Dept: OBGYN CLINIC | Facility: CLINIC | Age: 24
End: 2024-02-02
Payer: COMMERCIAL

## 2024-02-02 VITALS
DIASTOLIC BLOOD PRESSURE: 68 MMHG | WEIGHT: 158 LBS | BODY MASS INDEX: 25.39 KG/M2 | SYSTOLIC BLOOD PRESSURE: 120 MMHG | HEIGHT: 66 IN

## 2024-02-02 DIAGNOSIS — Z01.419 WELL WOMAN EXAM WITH ROUTINE GYNECOLOGICAL EXAM: Primary | ICD-10-CM

## 2024-02-02 DIAGNOSIS — Z30.011 ENCOUNTER FOR PRESCRIPTION OF ORAL CONTRACEPTIVES: ICD-10-CM

## 2024-02-02 PROCEDURE — G0145 SCR C/V CYTO,THINLAYER,RESCR: HCPCS | Performed by: PHYSICIAN ASSISTANT

## 2024-02-02 PROCEDURE — S0612 ANNUAL GYNECOLOGICAL EXAMINA: HCPCS | Performed by: PHYSICIAN ASSISTANT

## 2024-02-02 RX ORDER — NORGESTIMATE AND ETHINYL ESTRADIOL 7DAYSX3 28
1 KIT ORAL DAILY
Qty: 84 TABLET | Refills: 3 | Status: SHIPPED | OUTPATIENT
Start: 2024-02-02 | End: 2025-02-01

## 2024-02-02 NOTE — PROGRESS NOTES
Assessment   23 y.o.  presenting for annual exam.     Plan   Diagnoses and all orders for this visit:    Well woman exam with routine gynecological exam  -     Liquid-based pap, screening    Encounter for prescription of oral contraceptives  -     norgestimate-ethinyl estradiol (TriNessa, 28,) 0.18/0.215/0.25 MG-35 MCG per tablet; Take 1 tablet by mouth daily        Pap collected today  Contraception- happy with current OCP, normotensive and without side effects. Refills sent to pharmacy    Perineal hygiene reviewed. Weight bearing exercises minium of 150 mins/weekly advised. Kegel exercises recommended.   SBE encouraged, A yearly mammogram is recommended for breast cancer screening starting at age 40. ASCCP guidelines reviewed. Condoms encouraged with all sexual activity to prevent STI's. Gardisil vaccines recommended up to age 45. Calcium/ Vit D dietary requirements discussed.   Advised to call with any issues, all concerns & questions addressed.   See provided information in your after visit summary     F/U Annually and PRN    Results will be released to [facility], if abnormal will call or message to review and discuss treatment plan.     __________________________________________________________________    Subjective     Sheila Oakes is a 23 y.o.  presenting for annual exam. She is without complaint and does not want STD testing today.     SCREENING  Last Pap: 2020 negative      GYN  The patient's menstrual history is as follows:   Menarche Age: 15 years;  ; Period Cycle (Days): 28; Period Duration (Days): 5; Period Pattern: Regular; Menstrual Flow: Moderate; Dysmenorrhea: (!) Mild; Dysmenorrhea Symptoms: Cramping    Sexually active: Yes - single partner - male  Contraception: OCPs  Hx STI: denies     Hx Abnormal pap: denies  We reviewed ASCCP guidelines for Pap testing today.  Gardasil: She has not completed the Gardasil series. Will consider for the future     Denies vaginal discharge,  itching, odor, dyspareunia, pelvic pain and vulvar/vaginal symptoms      OB           Complaints: denies   Denies urgency, frequency, hematuria, leakage / change in stream, difficulty urinating.       BREAST  Complaints: denies   Denies: breast lump, breast tenderness, nipple discharge, skin color change, and skin lesion(s)      Pertinent Family Hx:   Family hx of breast cancer: no  Family hx of ovarian cancer: no  Family hx of colon cancer: no      GENERAL  PMH reviewed/updated and is as below.   Patient does follow with a PCP.    SOCIAL  Smoking: no  Alcohol:social  Drug: no  Occupation: [employer]      History reviewed. No pertinent past medical history.    Past Surgical History:   Procedure Laterality Date    TONSILECTOMY AND ADNOIDECTOMY      TYMPANOSTOMY TUBE PLACEMENT      6 sets - last time as a child    WISDOM TOOTH EXTRACTION           Current Outpatient Medications:     albuterol (ProAir HFA) 90 mcg/act inhaler, Inhale 2 puffs every 4 (four) hours as needed for wheezing or shortness of breath, Disp: 8.5 g, Rfl: 1    amphetamine-dextroamphetamine (ADDERALL XR) 20 MG 24 hr capsule, Take 1 capsule (20 mg total) by mouth every morning, Disp: 30 capsule, Rfl: 0    benzonatate (TESSALON PERLES) 100 mg capsule, Take 1 capsule (100 mg total) by mouth 3 (three) times a day as needed for cough, Disp: 20 capsule, Rfl: 0    EPINEPHrine (EPIPEN) 0.3 mg/0.3 mL SOAJ, Inject 0.3 mg into a muscle as needed, Disp: , Rfl:     fexofenadine (ALLEGRA) 180 MG tablet, Take 180 mg by mouth as needed, Disp: , Rfl:     norgestimate-ethinyl estradiol (TriNessa, 28,) 0.18/0.215/0.25 MG-35 MCG per tablet, Take 1 tablet by mouth daily, Disp: 84 tablet, Rfl: 0    busPIRone (BUSPAR) 10 mg tablet, Take 1 tablet (10 mg total) by mouth 3 (three) times a day (Patient not taking: Reported on 8/15/2023), Disp: 90 tablet, Rfl: 1    dicyclomine (BENTYL) 20 mg tablet, Take 1 tablet (20 mg total) by mouth  every 6 (six) hours (Patient not taking: Reported on 8/15/2023), Disp: 60 tablet, Rfl: 3    famotidine (PEPCID) 20 mg tablet, Take 1 tablet (20 mg total) by mouth 2 (two) times a day (Patient not taking: Reported on 2/2/2024), Disp: 60 tablet, Rfl: 5    predniSONE 10 mg tablet, 5 tabs daily x 2 days, 4 tabs daily x 2 days, 3 tabs daily x 2 days, 2 tabs daily x 2 days, 1 tab daily x 2 days (Patient not taking: Reported on 2/2/2024), Disp: 30 tablet, Rfl: 0    Allergies   Allergen Reactions    Mupirocin      tongue swollen    Sulfa Antibiotics Rash       Social History     Socioeconomic History    Marital status: Single     Spouse name: Not on file    Number of children: Not on file    Years of education: Not on file    Highest education level: Not on file   Occupational History    Not on file   Tobacco Use    Smoking status: Never    Smokeless tobacco: Never   Vaping Use    Vaping status: Never Used   Substance and Sexual Activity    Alcohol use: Yes     Comment: socially     Drug use: No    Sexual activity: Yes     Partners: Male     Birth control/protection: OCP   Other Topics Concern    Not on file   Social History Narrative    Not on file     Social Determinants of Health     Financial Resource Strain: Not on file   Food Insecurity: Not on file   Transportation Needs: Not on file   Physical Activity: Not on file   Stress: Not on file   Social Connections: Not on file   Intimate Partner Violence: Not on file   Housing Stability: Not on file       Review of Systems     ROS:  Constitutional: Negative for fatigue and unexpected weight change.   Respiratory: Negative for cough and shortness of breath.    Cardiovascular: Negative for chest pain and palpitations.   Gastrointestinal: Negative for abdominal pain and change in bowel habits  Breasts:  Negative, other than as noted above.   Genitourinary: Negative, other than as noted above.   Psychiatric: Negative for mood difficulties.      Objective      /68 (BP  "Location: Left arm, Patient Position: Sitting, Cuff Size: Large)   Ht 5' 5.8\" (1.671 m)   Wt 71.7 kg (158 lb)   LMP 01/23/2024 (Exact Date)   BMI 25.66 kg/m²     Physical Examination:    Patient appears well and is not in distress  Neck is supple without masses, no cervical or supraclavicular lymphadenopathy  Cardiovascular: regular rate and rhythm; no murmurs  Lungs: clear to auscultation bilaterally; no wheezes  Breasts are symmetrical without mass, tenderness, nipple discharge, skin changes or adenopathy.   Abdomen is soft and nontender without masses.   External genitals are normal without lesions or rashes.  Urethral meatus and urethra are normal  Bladder is normal to palpation  Vagina is normal without discharge or bleeding.   Cervix is normal without discharge or lesion.   Uterus is normal, mobile, nontender without palpable mass.  Adnexa are normal, nontender, without palpable mass.                 "

## 2024-02-08 LAB
LAB AP GYN PRIMARY INTERPRETATION: NORMAL
Lab: NORMAL

## 2024-03-08 ENCOUNTER — OFFICE VISIT (OUTPATIENT)
Dept: FAMILY MEDICINE CLINIC | Facility: CLINIC | Age: 24
End: 2024-03-08
Payer: COMMERCIAL

## 2024-03-08 VITALS
SYSTOLIC BLOOD PRESSURE: 114 MMHG | DIASTOLIC BLOOD PRESSURE: 80 MMHG | BODY MASS INDEX: 24.8 KG/M2 | OXYGEN SATURATION: 100 % | TEMPERATURE: 98.2 F | WEIGHT: 158 LBS | HEART RATE: 69 BPM | HEIGHT: 67 IN

## 2024-03-08 DIAGNOSIS — Z00.00 ANNUAL PHYSICAL EXAM: ICD-10-CM

## 2024-03-08 DIAGNOSIS — F90.9 ADULT ADHD: Primary | ICD-10-CM

## 2024-03-08 DIAGNOSIS — Z13.1 SCREENING FOR DIABETES MELLITUS: ICD-10-CM

## 2024-03-08 DIAGNOSIS — R53.83 OTHER FATIGUE: ICD-10-CM

## 2024-03-08 DIAGNOSIS — Z13.220 SCREENING CHOLESTEROL LEVEL: ICD-10-CM

## 2024-03-08 DIAGNOSIS — F41.8 DEPRESSION WITH ANXIETY: ICD-10-CM

## 2024-03-08 PROCEDURE — 99214 OFFICE O/P EST MOD 30 MIN: CPT | Performed by: NURSE PRACTITIONER

## 2024-03-08 PROCEDURE — 99395 PREV VISIT EST AGE 18-39: CPT | Performed by: NURSE PRACTITIONER

## 2024-03-08 RX ORDER — BUPROPION HYDROCHLORIDE 150 MG/1
150 TABLET ORAL EVERY MORNING
Qty: 90 TABLET | Refills: 1 | Status: SHIPPED | OUTPATIENT
Start: 2024-03-08 | End: 2024-09-04

## 2024-03-08 RX ORDER — DEXTROAMPHETAMINE SACCHARATE, AMPHETAMINE ASPARTATE MONOHYDRATE, DEXTROAMPHETAMINE SULFATE AND AMPHETAMINE SULFATE 3.75; 3.75; 3.75; 3.75 MG/1; MG/1; MG/1; MG/1
15 CAPSULE, EXTENDED RELEASE ORAL EVERY MORNING
Qty: 30 CAPSULE | Refills: 0 | Status: SHIPPED | OUTPATIENT
Start: 2024-03-08 | End: 2024-03-11 | Stop reason: SDUPTHER

## 2024-03-08 NOTE — PATIENT INSTRUCTIONS
Start Wellbutrin as directed, medcheck scheduled, call sooner if needed  Adderall decreased from 20mg to 15mg daily  Get bloodwork done when convenient  6 month Adderall medcheck or call sooner if any concerns

## 2024-03-08 NOTE — PROGRESS NOTES
Here to establish care-  Adderall- was taking 20mg XR increased from 15mg XR but felt like the 20mg made her feel foggy, would like to return to 15mg XR daily  Anxiety/depression- did not like buspar- made her feel dizzy. Reports some depression with her anxiety. Reviewed Lexapro vs Wellbutrin- she would like to try Wellbutrin. 6 week medcheck scheduled  Due to get bloodwork for GI- would like routine labs also ordered        Malinigretta ERIBERTO Oakes is a 23 y.o. female here for her yearly health maintenance exam.   Patient Active Problem List   Diagnosis    Allergic rhinitis due to pollen    History of recurrent ear infection    CIARA (generalized anxiety disorder)    Annual physical exam    Adult ADHD    Neuralgia, geniculate    Chest tightness    Labyrinthitis of right ear    History of CT scan of head    Generalized abdominal pain    Other headache syndrome    Vitamin D deficiency    B12 deficiency    Other fatigue     History reviewed. No pertinent past medical history.      Depression Screening and Follow-up Plan: Patient was screened for depression during today's encounter. They screened negative with a PHQ-2 score of 0.      PHQ-2/9 Depression Screening    Little interest or pleasure in doing things: 0 - not at all  Feeling down, depressed, or hopeless: 0 - not at all  PHQ-2 Score: 0  PHQ-2 Interpretation: Negative depression screen           Current Outpatient Medications   Medication Sig Dispense Refill    albuterol (ProAir HFA) 90 mcg/act inhaler Inhale 2 puffs every 4 (four) hours as needed for wheezing or shortness of breath 8.5 g 1    amphetamine-dextroamphetamine (ADDERALL XR, 15MG,) 15 MG 24 hr capsule Take 1 capsule (15 mg total) by mouth every morning Max Daily Amount: 15 mg 30 capsule 0    buPROPion (WELLBUTRIN XL) 150 mg 24 hr tablet Take 1 tablet (150 mg total) by mouth every morning 90 tablet 1    fexofenadine (ALLEGRA) 180 MG tablet Take 180 mg by mouth as needed      norgestimate-ethinyl estradiol  (TriNessa, 28,) 0.18/0.215/0.25 MG-35 MCG per tablet Take 1 tablet by mouth daily 84 tablet 3     No current facility-administered medications for this visit.     Allergies   Allergen Reactions    Mupirocin      tongue swollen    Sulfa Antibiotics Rash     Immunization History   Administered Date(s) Administered    COVID-19 MODERNA VACC 0.5 ML IM 04/06/2021, 04/06/2021, 05/04/2021, 05/04/2021    DTaP 01/14/2000, 03/22/2000, 05/10/2000, 02/27/2001, 11/15/2004    Hep A, ped/adol, 2 dose 11/16/2016, 11/24/2017    Hep B, adult 05/10/2000, 08/15/2000, 05/09/2001    Hepatitis A 11/16/2016, 11/24/2017    HiB 01/14/2000, 03/02/2000, 05/10/2000, 02/27/2001    INFLUENZA 11/16/2009, 11/16/2016, 11/26/2019, 11/13/2020    IPV 01/04/2000, 03/22/2000, 05/09/2001, 11/15/2004    Influenza Quadrivalent 3 years and older 11/01/2020    MMR 11/15/2000, 01/13/2003    Meningococcal B, OMV (BEXSERO) 12/26/2017, 04/23/2018    Meningococcal MCV4, Unspecified 08/10/2011, 11/10/2015    Meningococcal MCV4P 08/10/2011, 11/10/2015    Pneumococcal Conjugate PCV 7 09/22/2000, 11/15/2000    Tdap 08/16/2011    Tuberculin Skin Test-PPD Intradermal 08/07/2017, 08/20/2021    Varicella 11/15/2000, 05/14/2009       Patient Care Team:  PABLITO Guidry as PCP - General (Family Medicine)    Review of Systems   Constitutional:  Positive for unexpected weight change. Negative for activity change, diaphoresis, fatigue and fever.   HENT:  Negative for congestion, facial swelling, hearing loss, rhinorrhea, sinus pressure, sinus pain, sneezing, sore throat and voice change.    Eyes:  Negative for discharge and visual disturbance.   Respiratory:  Negative for cough, choking, chest tightness, shortness of breath, wheezing and stridor.    Cardiovascular:  Negative for chest pain, palpitations and leg swelling.   Gastrointestinal:  Negative for abdominal pain.   Endocrine: Negative for polydipsia, polyphagia and polyuria.   Genitourinary:  Negative for difficulty  urinating, dysuria, frequency and urgency.   Musculoskeletal:  Negative for arthralgias, back pain, gait problem, joint swelling, myalgias, neck pain and neck stiffness.   Skin:  Negative for color change, rash and wound.   Neurological:  Negative for dizziness, syncope, speech difficulty, weakness, light-headedness and headaches.   Hematological:  Negative for adenopathy. Does not bruise/bleed easily.   Psychiatric/Behavioral:  Positive for decreased concentration and dysphoric mood. Negative for agitation, behavioral problems, confusion, hallucinations, sleep disturbance and suicidal ideas. The patient is nervous/anxious.          Physical Exam :  Physical Exam  Vitals and nursing note reviewed.   Constitutional:       General: She is not in acute distress.     Appearance: Normal appearance. She is well-developed. She is not diaphoretic.   HENT:      Head: Normocephalic and atraumatic.      Right Ear: Tympanic membrane, ear canal and external ear normal.      Left Ear: Tympanic membrane, ear canal and external ear normal.      Nose: Nose normal.      Right Sinus: No maxillary sinus tenderness or frontal sinus tenderness.      Left Sinus: No maxillary sinus tenderness or frontal sinus tenderness.      Mouth/Throat:      Mouth: Mucous membranes are moist.      Pharynx: Uvula midline. No oropharyngeal exudate.   Eyes:      General:         Right eye: No discharge.         Left eye: No discharge.      Conjunctiva/sclera: Conjunctivae normal.      Pupils: Pupils are equal, round, and reactive to light.   Neck:      Thyroid: No thyromegaly.      Trachea: No tracheal deviation.   Cardiovascular:      Rate and Rhythm: Normal rate and regular rhythm.      Heart sounds: Normal heart sounds. No murmur heard.     No friction rub. No gallop.   Pulmonary:      Effort: Pulmonary effort is normal. No respiratory distress.      Breath sounds: Normal breath sounds. No wheezing or rales.   Abdominal:      General: Bowel sounds are  normal. There is no distension.      Palpations: Abdomen is soft. There is no mass.      Tenderness: There is no abdominal tenderness. There is no guarding or rebound.   Musculoskeletal:         General: No tenderness or deformity. Normal range of motion.      Cervical back: Normal range of motion and neck supple.      Right lower leg: No edema.      Left lower leg: No edema.   Lymphadenopathy:      Cervical: No cervical adenopathy.   Skin:     General: Skin is warm and dry.      Findings: No erythema or rash.   Neurological:      Mental Status: She is alert and oriented to person, place, and time.      Cranial Nerves: No cranial nerve deficit.      Coordination: Coordination normal.   Psychiatric:         Attention and Perception: Attention normal.         Mood and Affect: Mood normal. Affect is flat.         Speech: Speech normal.         Behavior: Behavior normal.         Thought Content: Thought content normal.         Cognition and Memory: Cognition and memory normal.         Judgment: Judgment normal.           Reviewed Updated [facility] Prior Wellness Visits:   Last Health Maintenance visit information was reviewed, patient interviewed , no change since last  visit yes  Last  visit information was reviewed, patient interviewed and updates made to the record today yes    Assessment and Plan:  1. Adult ADHD  amphetamine-dextroamphetamine (ADDERALL XR, 15MG,) 15 MG 24 hr capsule      2. Depression with anxiety  buPROPion (WELLBUTRIN XL) 150 mg 24 hr tablet      3. Annual physical exam  Comprehensive metabolic panel    CBC and differential      4. Screening cholesterol level  Lipid panel      5. Screening for diabetes mellitus  Hemoglobin A1C      6. Other fatigue  TSH, 3rd generation with Free T4 reflex    Vitamin D 25 hydroxy          Health Maintenance Due   Topic Date Due    Hepatitis C Screening  Never done    HIV Screening  Never done    HPV Vaccine (1 - 3-dose series) Never done    Chlamydia Screening   Never done    DTaP,Tdap,and Td Vaccines (7 - Td or Tdap) 08/16/2021    Influenza Vaccine (1) 09/01/2023    COVID-19 Vaccine (5 - 2023-24 season) 09/01/2023    Annual Physical  11/04/2023

## 2024-03-11 DIAGNOSIS — F90.9 ADULT ADHD: ICD-10-CM

## 2024-03-11 RX ORDER — DEXTROAMPHETAMINE SACCHARATE, AMPHETAMINE ASPARTATE MONOHYDRATE, DEXTROAMPHETAMINE SULFATE AND AMPHETAMINE SULFATE 3.75; 3.75; 3.75; 3.75 MG/1; MG/1; MG/1; MG/1
15 CAPSULE, EXTENDED RELEASE ORAL EVERY MORNING
Qty: 30 CAPSULE | Refills: 0 | Status: SHIPPED | OUTPATIENT
Start: 2024-03-11

## 2024-03-11 NOTE — TELEPHONE ENCOUNTER
[facility] does not have medication in stock, pt requesting to send to [facility].

## 2024-03-16 ENCOUNTER — APPOINTMENT (OUTPATIENT)
Dept: LAB | Facility: CLINIC | Age: 24
End: 2024-03-16
Payer: COMMERCIAL

## 2024-03-16 DIAGNOSIS — R19.4 CHANGE IN BOWEL HABITS: ICD-10-CM

## 2024-03-16 DIAGNOSIS — R10.9 ABDOMINAL PAIN, UNSPECIFIED ABDOMINAL LOCATION: ICD-10-CM

## 2024-03-16 PROCEDURE — 87177 OVA AND PARASITES SMEARS: CPT

## 2024-03-16 PROCEDURE — 87505 NFCT AGENT DETECTION GI: CPT

## 2024-03-16 PROCEDURE — 87338 HPYLORI STOOL AG IA: CPT

## 2024-03-16 PROCEDURE — 83993 ASSAY FOR CALPROTECTIN FECAL: CPT

## 2024-03-16 PROCEDURE — 87209 SMEAR COMPLEX STAIN: CPT

## 2024-03-18 ENCOUNTER — TELEPHONE (OUTPATIENT)
Age: 24
End: 2024-03-18

## 2024-03-18 LAB
C COLI+JEJUNI TUF STL QL NAA+PROBE: NEGATIVE
EC STX1+STX2 GENES STL QL NAA+PROBE: NEGATIVE
G LAMBLIA AG STL QL IA: NEGATIVE
SALMONELLA SP SPAO STL QL NAA+PROBE: NEGATIVE
SHIGELLA SP+EIEC IPAH STL QL NAA+PROBE: NEGATIVE

## 2024-03-18 NOTE — TELEPHONE ENCOUNTER
Patients GI provider:  KAYLYN Gupta     Number to return call: [phone]    Reason for call: [facility] lab calling to report C-Diff cancelled due to formed stool.    Scheduled procedure/appointment date if applicable: 7/12/24

## 2024-03-19 ENCOUNTER — HOSPITAL ENCOUNTER (EMERGENCY)
Facility: HOSPITAL | Age: 24
Discharge: HOME | End: 2024-03-19
Attending: EMERGENCY MEDICINE
Payer: COMMERCIAL

## 2024-03-19 ENCOUNTER — APPOINTMENT (EMERGENCY)
Dept: CT IMAGING | Facility: HOSPITAL | Age: 24
End: 2024-03-19
Payer: COMMERCIAL

## 2024-03-19 ENCOUNTER — OFFICE VISIT (OUTPATIENT)
Dept: FAMILY MEDICINE CLINIC | Facility: CLINIC | Age: 24
End: 2024-03-19
Payer: COMMERCIAL

## 2024-03-19 ENCOUNTER — APPOINTMENT (EMERGENCY)
Dept: ULTRASOUND IMAGING | Facility: HOSPITAL | Age: 24
End: 2024-03-19
Payer: COMMERCIAL

## 2024-03-19 VITALS
SYSTOLIC BLOOD PRESSURE: 137 MMHG | OXYGEN SATURATION: 98 % | DIASTOLIC BLOOD PRESSURE: 77 MMHG | HEART RATE: 87 BPM | TEMPERATURE: 98.8 F | RESPIRATION RATE: 18 BRPM

## 2024-03-19 VITALS
DIASTOLIC BLOOD PRESSURE: 86 MMHG | HEART RATE: 111 BPM | OXYGEN SATURATION: 98 % | BODY MASS INDEX: 24.96 KG/M2 | WEIGHT: 159 LBS | SYSTOLIC BLOOD PRESSURE: 122 MMHG | TEMPERATURE: 98.7 F | HEIGHT: 67 IN | RESPIRATION RATE: 18 BRPM

## 2024-03-19 DIAGNOSIS — N13.30 HYDRONEPHROSIS OF RIGHT KIDNEY: ICD-10-CM

## 2024-03-19 DIAGNOSIS — R39.15 URINARY URGENCY: ICD-10-CM

## 2024-03-19 DIAGNOSIS — R10.32 LLQ ABDOMINAL PAIN: Primary | ICD-10-CM

## 2024-03-19 DIAGNOSIS — D25.9 UTERINE FIBROID: ICD-10-CM

## 2024-03-19 DIAGNOSIS — R10.32 LEFT LOWER QUADRANT PAIN: ICD-10-CM

## 2024-03-19 DIAGNOSIS — R10.9 LEFT FLANK PAIN: Primary | ICD-10-CM

## 2024-03-19 LAB
ALBUMIN SERPL BCP-MCNC: 4.3 G/DL (ref 3.5–5)
ALP SERPL-CCNC: 60 U/L (ref 34–104)
ALT SERPL W P-5'-P-CCNC: 11 U/L (ref 7–52)
ANION GAP SERPL CALCULATED.3IONS-SCNC: 8 MMOL/L (ref 4–13)
AST SERPL W P-5'-P-CCNC: 16 U/L (ref 13–39)
BACTERIA UR QL AUTO: NORMAL /HPF
BASOPHILS # BLD AUTO: 0.04 THOUSANDS/ÂΜL (ref 0–0.1)
BASOPHILS NFR BLD AUTO: 1 % (ref 0–1)
BILIRUB SERPL-MCNC: 0.32 MG/DL (ref 0.2–1)
BILIRUB UR QL STRIP: NEGATIVE
BUN SERPL-MCNC: 11 MG/DL (ref 5–25)
CALCIUM SERPL-MCNC: 9.3 MG/DL (ref 8.4–10.2)
CHLORIDE SERPL-SCNC: 103 MMOL/L (ref 96–108)
CLARITY UR: CLEAR
CO2 SERPL-SCNC: 26 MMOL/L (ref 21–32)
COLOR UR: YELLOW
CREAT SERPL-MCNC: 0.65 MG/DL (ref 0.6–1.3)
EOSINOPHIL # BLD AUTO: 0.11 THOUSAND/ÂΜL (ref 0–0.61)
EOSINOPHIL NFR BLD AUTO: 2 % (ref 0–6)
ERYTHROCYTE [DISTWIDTH] IN BLOOD BY AUTOMATED COUNT: 12.5 % (ref 11.6–15.1)
EXT PREGNANCY TEST URINE: NEGATIVE
EXT. CONTROL: NORMAL
GFR SERPL CREATININE-BSD FRML MDRD: 124 ML/MIN/1.73SQ M
GLUCOSE SERPL-MCNC: 92 MG/DL (ref 65–140)
GLUCOSE UR STRIP-MCNC: NEGATIVE MG/DL
H PYLORI AG STL QL IA: NEGATIVE
HCT VFR BLD AUTO: 41 % (ref 34.8–46.1)
HGB BLD-MCNC: 13.4 G/DL (ref 11.5–15.4)
HGB UR QL STRIP.AUTO: ABNORMAL
IMM GRANULOCYTES # BLD AUTO: 0.02 THOUSAND/UL (ref 0–0.2)
IMM GRANULOCYTES NFR BLD AUTO: 0 % (ref 0–2)
KETONES UR STRIP-MCNC: NEGATIVE MG/DL
LEUKOCYTE ESTERASE UR QL STRIP: NEGATIVE
LYMPHOCYTES # BLD AUTO: 2.25 THOUSANDS/ÂΜL (ref 0.6–4.47)
LYMPHOCYTES NFR BLD AUTO: 33 % (ref 14–44)
MCH RBC QN AUTO: 28.8 PG (ref 26.8–34.3)
MCHC RBC AUTO-ENTMCNC: 32.7 G/DL (ref 31.4–37.4)
MCV RBC AUTO: 88 FL (ref 82–98)
MONOCYTES # BLD AUTO: 0.6 THOUSAND/ÂΜL (ref 0.17–1.22)
MONOCYTES NFR BLD AUTO: 9 % (ref 4–12)
NEUTROPHILS # BLD AUTO: 3.91 THOUSANDS/ÂΜL (ref 1.85–7.62)
NEUTS SEG NFR BLD AUTO: 55 % (ref 43–75)
NITRITE UR QL STRIP: NEGATIVE
NON-SQ EPI CELLS URNS QL MICRO: NORMAL /HPF
NRBC BLD AUTO-RTO: 0 /100 WBCS
PH UR STRIP.AUTO: 6 [PH]
PLATELET # BLD AUTO: 240 THOUSANDS/UL (ref 149–390)
PMV BLD AUTO: 10.7 FL (ref 8.9–12.7)
POTASSIUM SERPL-SCNC: 3.7 MMOL/L (ref 3.5–5.3)
PROT SERPL-MCNC: 7 G/DL (ref 6.4–8.4)
PROT UR STRIP-MCNC: NEGATIVE MG/DL
RBC # BLD AUTO: 4.65 MILLION/UL (ref 3.81–5.12)
RBC #/AREA URNS AUTO: NORMAL /HPF
SL AMB  POCT GLUCOSE, UA: NEGATIVE
SL AMB LEUKOCYTE ESTERASE,UA: NEGATIVE
SL AMB POCT BILIRUBIN,UA: NEGATIVE
SL AMB POCT BLOOD,UA: NEGATIVE
SL AMB POCT CLARITY,UA: CLEAR
SL AMB POCT COLOR,UA: YELLOW
SL AMB POCT KETONES,UA: NEGATIVE
SL AMB POCT NITRITE,UA: NEGATIVE
SL AMB POCT PH,UA: 7.5
SL AMB POCT SPECIFIC GRAVITY,UA: 1.02
SL AMB POCT URINE PROTEIN: NEGATIVE
SL AMB POCT UROBILINOGEN: ABNORMAL
SODIUM SERPL-SCNC: 137 MMOL/L (ref 135–147)
SP GR UR STRIP.AUTO: 1.01
UROBILINOGEN UR QL STRIP.AUTO: 0.2 E.U./DL
WBC # BLD AUTO: 6.93 THOUSAND/UL (ref 4.31–10.16)
WBC #/AREA URNS AUTO: NORMAL /HPF

## 2024-03-19 PROCEDURE — 96374 THER/PROPH/DIAG INJ IV PUSH: CPT

## 2024-03-19 PROCEDURE — 74177 CT ABD & PELVIS W/CONTRAST: CPT

## 2024-03-19 PROCEDURE — 99285 EMERGENCY DEPT VISIT HI MDM: CPT | Performed by: PHYSICIAN ASSISTANT

## 2024-03-19 PROCEDURE — 80053 COMPREHEN METABOLIC PANEL: CPT | Performed by: PHYSICIAN ASSISTANT

## 2024-03-19 PROCEDURE — 99213 OFFICE O/P EST LOW 20 MIN: CPT | Performed by: NURSE PRACTITIONER

## 2024-03-19 PROCEDURE — 81002 URINALYSIS NONAUTO W/O SCOPE: CPT | Performed by: NURSE PRACTITIONER

## 2024-03-19 PROCEDURE — 85025 COMPLETE CBC W/AUTO DIFF WBC: CPT | Performed by: PHYSICIAN ASSISTANT

## 2024-03-19 PROCEDURE — 36415 COLL VENOUS BLD VENIPUNCTURE: CPT | Performed by: PHYSICIAN ASSISTANT

## 2024-03-19 PROCEDURE — 81001 URINALYSIS AUTO W/SCOPE: CPT

## 2024-03-19 PROCEDURE — 76856 US EXAM PELVIC COMPLETE: CPT

## 2024-03-19 PROCEDURE — 76830 TRANSVAGINAL US NON-OB: CPT

## 2024-03-19 PROCEDURE — 81025 URINE PREGNANCY TEST: CPT | Performed by: PHYSICIAN ASSISTANT

## 2024-03-19 PROCEDURE — 87186 SC STD MICRODIL/AGAR DIL: CPT | Performed by: NURSE PRACTITIONER

## 2024-03-19 PROCEDURE — 99284 EMERGENCY DEPT VISIT MOD MDM: CPT

## 2024-03-19 PROCEDURE — 87086 URINE CULTURE/COLONY COUNT: CPT | Performed by: NURSE PRACTITIONER

## 2024-03-19 PROCEDURE — 96375 TX/PRO/DX INJ NEW DRUG ADDON: CPT

## 2024-03-19 PROCEDURE — 96361 HYDRATE IV INFUSION ADD-ON: CPT

## 2024-03-19 PROCEDURE — 87147 CULTURE TYPE IMMUNOLOGIC: CPT | Performed by: NURSE PRACTITIONER

## 2024-03-19 PROCEDURE — 87077 CULTURE AEROBIC IDENTIFY: CPT | Performed by: NURSE PRACTITIONER

## 2024-03-19 RX ORDER — DIPHENHYDRAMINE HYDROCHLORIDE 50 MG/ML
25 INJECTION INTRAMUSCULAR; INTRAVENOUS ONCE
Status: COMPLETED | OUTPATIENT
Start: 2024-03-19 | End: 2024-03-19

## 2024-03-19 RX ORDER — KETOROLAC TROMETHAMINE 30 MG/ML
15 INJECTION, SOLUTION INTRAMUSCULAR; INTRAVENOUS ONCE
Status: COMPLETED | OUTPATIENT
Start: 2024-03-19 | End: 2024-03-19

## 2024-03-19 RX ORDER — ONDANSETRON 2 MG/ML
4 INJECTION INTRAMUSCULAR; INTRAVENOUS ONCE
Status: COMPLETED | OUTPATIENT
Start: 2024-03-19 | End: 2024-03-19

## 2024-03-19 RX ORDER — ACETAMINOPHEN 325 MG/1
975 TABLET ORAL ONCE
Status: COMPLETED | OUTPATIENT
Start: 2024-03-19 | End: 2024-03-19

## 2024-03-19 RX ADMIN — ACETAMINOPHEN 975 MG: 325 TABLET ORAL at 13:51

## 2024-03-19 RX ADMIN — DIPHENHYDRAMINE HYDROCHLORIDE 25 MG: 50 INJECTION, SOLUTION INTRAMUSCULAR; INTRAVENOUS at 12:37

## 2024-03-19 RX ADMIN — ONDANSETRON 4 MG: 2 INJECTION INTRAMUSCULAR; INTRAVENOUS at 11:43

## 2024-03-19 RX ADMIN — IOHEXOL 100 ML: 350 INJECTION, SOLUTION INTRAVENOUS at 12:24

## 2024-03-19 RX ADMIN — KETOROLAC TROMETHAMINE 15 MG: 30 INJECTION, SOLUTION INTRAMUSCULAR; INTRAVENOUS at 12:07

## 2024-03-19 RX ADMIN — SODIUM CHLORIDE 1000 ML: 0.9 INJECTION, SOLUTION INTRAVENOUS at 11:43

## 2024-03-19 NOTE — DISCHARGE INSTRUCTIONS
"Schedule an appointment with your PCP and/or urology for further evaluation and follow-up imaging of the following CT finding:  \"Mild right-sided hydroureteronephrosis down to the mid ureter. No findings for obstructive process. No hydronephrosis on the left. Findings are non-specific. Consider follow-up US or CT urogram on a nonemergent basis to assess for persistence and to , exclude any underlying process.\"  "

## 2024-03-19 NOTE — Clinical Note
Sheila Oakes was seen and treated in our emergency department on 3/19/2024.                Diagnosis:     Sheila  may return to work on return date.    She may return on this date: 03/21/2024         If you have any questions or concerns, please don't hesitate to call.      Karli Correa PA-C    ______________________________           _______________          _______________  Hospital Representative                              Date                                Time

## 2024-03-19 NOTE — LETTER
March 19, 2024     Patient: Sheila Oakes  YOB: 2000  Date of Visit: 3/19/2024      To Whom it May Concern:    Sheila Oakes is under my professional care. Sheila was seen in my office on 3/19/2024. Please excuse from work 3/19/2024.     If you have any questions or concerns, please don't hesitate to call.         Sincerely,          PABLITO Guidry        CC: No Recipients

## 2024-03-19 NOTE — ED PROVIDER NOTES
History  Chief Complaint   Patient presents with    Flank Pain     Pt report left flank pain. Pt reports urine frequency and bladder pressure.      Patient is a 23-year-old female presenting to the ED for evaluation of left lower quadrant abdominal pain and urinary symptoms x 3 days.  Patient states that she has had pain in the left lower quadrant that radiates to the suprapubic region and into the left low back since Sunday.  She reports associated nausea and has had a few episodes of nonbloody/nonmelanotic diarrhea.  She also reports urinary frequency and urinary urgency and had 2 episodes of blood-tinged urine. She denies any fevers, chills, vomiting, dysuria, vaginal bleeding or vaginal discharge. She denies any history of ovarian cysts, kidney stones or kidney infections. Her last menstrual period was about 3 weeks ago and was normal. She denies any chance of pregnancy.         Prior to Admission Medications   Prescriptions Last Dose Informant Patient Reported? Taking?   albuterol (ProAir HFA) 90 mcg/act inhaler Past Month  No Yes   Sig: Inhale 2 puffs every 4 (four) hours as needed for wheezing or shortness of breath   amphetamine-dextroamphetamine (ADDERALL XR, 15MG,) 15 MG 24 hr capsule 3/19/2024  No Yes   Sig: Take 1 capsule (15 mg total) by mouth every morning Max Daily Amount: 15 mg   buPROPion (WELLBUTRIN XL) 150 mg 24 hr tablet 3/19/2024  No Yes   Sig: Take 1 tablet (150 mg total) by mouth every morning   fexofenadine (ALLEGRA) 180 MG tablet Past Month Self Yes Yes   Sig: Take 180 mg by mouth as needed   norgestimate-ethinyl estradiol (TriNessa, 28,) 0.18/0.215/0.25 MG-35 MCG per tablet 3/19/2024  No Yes   Sig: Take 1 tablet by mouth daily      Facility-Administered Medications: None       History reviewed. No pertinent past medical history.    Past Surgical History:   Procedure Laterality Date    TONSILECTOMY AND ADNOIDECTOMY      TYMPANOSTOMY TUBE PLACEMENT      6 sets - last time as a child    WISDOM  TOOTH EXTRACTION         Family History   Problem Relation Age of Onset    Fibroids Mother     GI problems Father     Seizures Sister     Arthritis Maternal Grandmother      I have reviewed and agree with the history as documented.    E-Cigarette/Vaping    E-Cigarette Use Never User      E-Cigarette/Vaping Substances    Nicotine No     THC No     CBD No     Flavoring No     Other No     Unknown No      Social History     Tobacco Use    Smoking status: Never    Smokeless tobacco: Never   Vaping Use    Vaping status: Never Used   Substance Use Topics    Alcohol use: Yes     Comment: socially     Drug use: No       Review of Systems   Constitutional:  Negative for chills and fever.   HENT:  Negative for congestion, rhinorrhea and sore throat.    Eyes:  Negative for visual disturbance.   Respiratory:  Negative for cough and shortness of breath.    Cardiovascular:  Negative for chest pain, palpitations and leg swelling.   Gastrointestinal:  Positive for abdominal pain and nausea. Negative for constipation, diarrhea and vomiting.   Genitourinary:  Positive for frequency, hematuria and urgency. Negative for difficulty urinating, dysuria and vaginal bleeding.   Musculoskeletal:  Positive for back pain (left low back). Negative for neck pain.   Skin:  Negative for rash.   Neurological:  Negative for dizziness, syncope, weakness and headaches.   All other systems reviewed and are negative.      Physical Exam  Physical Exam  Vitals and nursing note reviewed.   Constitutional:       General: She is awake.      Appearance: Normal appearance. She is well-developed. She is not toxic-appearing or diaphoretic.   HENT:      Head: Normocephalic and atraumatic.      Right Ear: External ear normal.      Left Ear: External ear normal.      Nose: Nose normal.      Mouth/Throat:      Lips: Pink.      Mouth: Mucous membranes are moist.   Eyes:      General: Lids are normal. No scleral icterus.     Conjunctiva/sclera: Conjunctivae normal.       Pupils: Pupils are equal, round, and reactive to light.   Cardiovascular:      Rate and Rhythm: Normal rate and regular rhythm.      Pulses: Normal pulses.           Radial pulses are 2+ on the right side and 2+ on the left side.      Heart sounds: Normal heart sounds, S1 normal and S2 normal.   Pulmonary:      Effort: Pulmonary effort is normal. No accessory muscle usage.      Breath sounds: Normal breath sounds. No stridor. No decreased breath sounds, wheezing, rhonchi or rales.   Abdominal:      General: Abdomen is flat. Bowel sounds are normal. There is no distension.      Palpations: Abdomen is soft.      Tenderness: There is abdominal tenderness in the suprapubic area and left lower quadrant. There is no right CVA tenderness, left CVA tenderness, guarding or rebound.      Comments: Tenderness over the suprapubic region and left lower quadrant.  No rebound tenderness, guarding or rigidity.  No CVA tenderness.   Musculoskeletal:      Cervical back: Full passive range of motion without pain and neck supple. No signs of trauma. No pain with movement.      Right lower leg: No edema.      Left lower leg: No edema.      Comments: Tenderness to palpation over the left lumbar paraspinal muscles.  No midline tenderness, deformities or step-offs.   Lymphadenopathy:      Cervical: No cervical adenopathy.   Skin:     General: Skin is warm and dry.      Capillary Refill: Capillary refill takes less than 2 seconds.      Coloration: Skin is not cyanotic, jaundiced or pale.   Neurological:      Mental Status: She is alert and oriented to person, place, and time.      GCS: GCS eye subscore is 4. GCS verbal subscore is 5. GCS motor subscore is 6.      Gait: Gait normal.   Psychiatric:         Mood and Affect: Mood normal.         Speech: Speech normal.         Behavior: Behavior is cooperative.         Vital Signs  ED Triage Vitals   Temperature Pulse Respirations Blood Pressure SpO2   03/19/24 1210 03/19/24 1117 03/19/24 1117  03/19/24 1117 03/19/24 1117   98.8 °F (37.1 °C) (!) 118 18 137/77 96 %      Temp Source Heart Rate Source Patient Position - Orthostatic VS BP Location FiO2 (%)   03/19/24 1210 03/19/24 1117 03/19/24 1117 03/19/24 1117 --   Oral Monitor Lying Right arm       Pain Score       03/19/24 1117       8           Vitals:    03/19/24 1117 03/19/24 1138 03/19/24 1209   BP: 137/77     Pulse: (!) 118 (!) 108 87   Patient Position - Orthostatic VS: Lying           Visual Acuity      ED Medications  Medications   ondansetron (ZOFRAN) injection 4 mg (4 mg Intravenous Given 3/19/24 1143)   sodium chloride 0.9 % bolus 1,000 mL (0 mL Intravenous Stopped 3/19/24 1518)   ketorolac (TORADOL) injection 15 mg (15 mg Intravenous Given 3/19/24 1207)   iohexol (OMNIPAQUE) 350 MG/ML injection (MULTI-DOSE) 100 mL (100 mL Intravenous Given 3/19/24 1224)   diphenhydrAMINE (BENADRYL) injection 25 mg (25 mg Intravenous Given 3/19/24 1237)   acetaminophen (TYLENOL) tablet 975 mg (975 mg Oral Given 3/19/24 1351)       Diagnostic Studies  Results Reviewed       Procedure Component Value Units Date/Time    Comprehensive metabolic panel [[id number]] Collected: 03/19/24 1143    Lab Status: Final result Specimen: Blood from Arm, Left Updated: 03/19/24 1212     Sodium 137 mmol/L      Potassium 3.7 mmol/L      Chloride 103 mmol/L      CO2 26 mmol/L      ANION GAP 8 mmol/L      BUN 11 mg/dL      Creatinine 0.65 mg/dL      Glucose 92 mg/dL      Calcium 9.3 mg/dL      AST 16 U/L      ALT 11 U/L      Alkaline Phosphatase 60 U/L      Total Protein 7.0 g/dL      Albumin 4.3 g/dL      Total Bilirubin 0.32 mg/dL      eGFR 124 ml/min/1.73sq m     Narrative:      National Kid[employer] Disease Foundation guidelines for Chronic Kidney Disease (CKD):     Stage 1 with normal or high GFR (GFR > 90 mL/min/1.73 square meters)    Stage 2 Mild CKD (GFR = 60-89 mL/min/1.73 square meters)    Stage 3A Moderate CKD (GFR = 45-59 mL/min/1.73 square meters)    Stage 3B Moderate CKD (GFR  = 30-44 mL/min/1.73 square meters)    Stage 4 Severe CKD (GFR = 15-29 mL/min/1.73 square meters)    Stage 5 End Stage CKD (GFR <15 mL/min/1.73 square meters)  Note: GFR calculation is accurate only with a steady state creatinine    Urine Microscopic [[id number]]  (Normal) Collected: 03/19/24 1138    Lab Status: Final result Specimen: Urine, Clean Catch Updated: 03/19/24 1156     RBC, UA 0-1 /hpf      WBC, UA 0-1 /hpf      Epithelial Cells Occasional /hpf      Bacteria, UA None Seen /hpf     UA w Reflex to Microscopic w Reflex to Culture [[id number]]  (Abnormal) Collected: 03/19/24 1138    Lab Status: Final result Specimen: Urine, Clean Catch Updated: 03/19/24 1152     Color, UA Yellow     Clarity, UA Clear     Specific Gravity, UA 1.010     pH, UA 6.0     Leukocytes, UA Negative     Nitrite, UA Negative     Protein, UA Negative mg/dl      Glucose, UA Negative mg/dl      Ketones, UA Negative mg/dl      Urobilinogen, UA 0.2 E.U./dl      Bilirubin, UA Negative     Occult Blood, UA Trace-lysed    CBC and differential [[id number]] Collected: 03/19/24 1143    Lab Status: Final result Specimen: Blood from Arm, Left Updated: 03/19/24 1151     WBC 6.93 Thousand/uL      RBC 4.65 Million/uL      Hemoglobin 13.4 g/dL      Hematocrit 41.0 %      MCV 88 fL      MCH 28.8 pg      MCHC 32.7 g/dL      RDW 12.5 %      MPV 10.7 fL      Platelets 240 Thousands/uL      nRBC 0 /100 WBCs      Neutrophils Relative 55 %      Immature Grans % 0 %      Lymphocytes Relative 33 %      Monocytes Relative 9 %      Eosinophils Relative 2 %      Basophils Relative 1 %      Neutrophils Absolute 3.91 Thousands/µL      Absolute Immature Grans 0.02 Thousand/uL      Absolute Lymphocytes 2.25 Thousands/µL      Absolute Monocytes 0.60 Thousand/µL      Eosinophils Absolute 0.11 Thousand/µL      Basophils Absolute 0.04 Thousands/µL     POCT pregnancy, urine [[id number]]  (Normal) Resulted: 03/19/24 1145    Lab Status: Final result Updated: 03/19/24 1145     EXT  Preg Test, Ur Negative     Control Valid                   US pelvis complete w transvaginal   Final Result by Paxton Frausto MD (03/19 8540)      No evidence for ovarian torsion or other acute pelvic abnormalities.                              Workstation performed: [id number]         CT abdomen pelvis with contrast   Final Result by Zack Medrano MD (03/19 1331)      1. Mild right-sided hydroureteronephrosis down to the mid ureter. No findings for obstructive process. No hydronephrosis on the left. Findings are non-specific. Consider follow-up US or CT urogram on a nonemergent basis to assess for persistence and to    exclude any underlying process.      The study was marked in EPIC for immediate notification.         Workstation performed: [id number]                    Procedures  Procedures         ED Course  ED Course as of 03/19/24 1602   Tue Mar 19, 2024   1230 Patient developed 3-4 scattered hives on her face/neck after IV contrast. She denies any shortness of breath, throat closing sensation, lip/tongue swelling, N/V, etc. No rash anywhere else. Patient notes that she does randomly break out in hives from time to time; however, given timing with contrast administration, cannot rule out allergy to contrast. Allergy was listed in patient's chart and we discussed importance of mentioning this in the future should she need another CT scan at another point in time. A dose of IV Benadryl was ordered.    1340 Patient re-evaluated. We discussed all CT findings in detail.  She continues to have pain in the left lower quadrant radiating to the left low back.  Will proceed with ultrasound to rule out ovarian torsion or other pelvic etiology. Hives have resolved.                                SBIRT 22yo+      Flowsheet Row Most Recent Value   Initial Alcohol Screen: US AUDIT-C     1. How often do you have a drink containing alcohol? 0 Filed at: 03/19/2024 1119   2. How many drinks containing alcohol do you  "have on a typical day you are drinking?  0 Filed at: 03/19/2024 1119   3b. FEMALE Any Age, or MALE 65+: How often do you have 4 or more drinks on one occassion? 0 Filed at: 03/19/2024 1119   Audit-C Score 0 Filed at: 03/19/2024 1119   YVONNE: How many times in the past year have you...    Used an illegal drug or used a prescription medication for non-medical reasons? Never Filed at: 03/19/2024 1119                      Medical Decision Making  Patient is a 23-year-old female presenting to the ED for evaluation of left lower quadrant abdominal pain and urinary symptoms x 3 days.     DDx including but not limited to: ovarian cyst, ovarian torsion, ectopic pregnancy, UTI, renal colic, pyelonephritis GI etiology, appendicitis, diverticulitis.    I reviewed all of patient's labs which are unremarkable.  UA with no evidence of infection and only trace lysed blood.  CT shows mild right-sided hydroureteronephrosis down to the mid ureter with no findings for obstructive process and no hydronephrosis on the left. Patient is not having any right-sided abdominal or flank pain.  We discussed this finding in detail and I advised her that she will need follow-up with urology and further imaging to further evaluate.  Patient continues to have pain in the left lower quadrant - will proceed with pelvic ultrasound to rule out ovarian torsion or other pelvic etiology.  Pelvic ultrasound shows a \"nonaggressive 6 mm rounded echogenic focus in the anterior junctional zone at the fundus (560/140) potentially a tiny subserosal fibroid\" but no evidence of ovarian cyst or torsion. Patient feeling somewhat improved; she did start her period while in the ED so this may have been the cause of some of her symptoms.  I advised her to follow-up with PCP and/or urology for further evaluation of CT finding. She was encouraged to use a heating pad and take Motrin/Tylenol as needed for pain.  Instructed her to return to the ED if she developed any " worsening pain or any other new/concerning symptoms.    The management plan was discussed in detail with the patient at bedside and all questions were answered. Strict ED return instructions were discussed at bedside. Prior to discharge, both verbal and written instructions were provided. We discussed the signs and symptoms that should prompt the patient to return to the ED. All questions were answered and the patient was comfortable with the plan of care and discharged home. The patient agrees to return to the Emergency Department for concerns and/or progression of illness.     Amount and/or Complexity of Data Reviewed  Labs: ordered.  Radiology: ordered.    Risk  OTC drugs.  Prescription drug management.             Disposition  Final diagnoses:   LLQ abdominal pain   Urinary urgency   Hydronephrosis of right kidney   Uterine fibroid     Time reflects when diagnosis was documented in both MDM as applicable and the Disposition within this note       Time User Action Codes Description Comment    3/19/2024  3:09 PM Karli Correa Add [R10.32] LLQ abdominal pain     3/19/2024  3:09 PM Karli Correa Add [R39.15] Urinary urgency     3/19/2024  3:09 PM Karli Correa Add [N13.30] Hydronephrosis of right kidney     3/19/2024  3:10 PM Karli Correa Add [D25.9] Uterine fibroid           ED Disposition       ED Disposition   Discharge    Condition   Stable    Date/Time   Tue Mar 19, 2024 1509    Comment   Sheila Oakes discharge to home/self care.                   Follow-up Information       Follow up With Specialties Details Why Contact Info Additional Information    [facility], Nurse Practitioner, Emergency Medicine Schedule an appointment as soon as possible for a visit   [address] [facility] [address]  [facility] [address]  [phone]       [facility] Schedule an appointment as soon as possible for a visit   [address] [place] [address]  [address] [facility]  [place] [address]  [phone] [facility] [place], [address], [place], [address], [phone]    [facility] Emergency Medicine  If symptoms worsen [address] [place] [address]  [phone] [facility], [address], [place], [place] [address]            Discharge Medication List as of 3/19/2024  3:11 PM        CONTINUE these medications which have NOT CHANGED    Details   albuterol (ProAir HFA) 90 mcg/act inhaler Inhale 2 puffs every 4 (four) hours as needed for wheezing or shortness of breath, Starting Mon 12/4/2023, Normal      amphetamine-dextroamphetamine (ADDERALL XR, 15MG,) 15 MG 24 hr capsule Take 1 capsule (15 mg total) by mouth every morning Max Daily Amount: 15 mg, Starting Mon 3/11/2024, Normal      buPROPion (WELLBUTRIN XL) 150 mg 24 hr tablet Take 1 tablet (150 mg total) by mouth every morning, Starting Fri 3/8/2024, Until Wed 9/4/2024, Normal      fexofenadine (ALLEGRA) 180 MG tablet Take 180 mg by mouth as needed, Historical Med      norgestimate-ethinyl estradiol (TriNessa, 28,) 0.18/0.215/0.25 MG-35 MCG per tablet Take 1 tablet by mouth daily, Starting Fri 2/2/2024, Until Sat 2/1/2025, Normal                 PDMP Review         Value Time User    PDMP Reviewed  Yes 12/29/2023  5:58 AM PABLITO Larry            ED Provider  Electronically Signed by             Karli Correa PA-C  03/19/24 9577

## 2024-03-19 NOTE — PROGRESS NOTES
Name: Sheila Oakes      : 2000      MRN: [id number]  Encounter Provider: PABLITO Guidry  Encounter Date: 3/19/2024   Encounter department: [facility]    Assessment & Plan     1. Left flank pain  -     Transfer to other facility    2. Left lower quadrant pain  -     Transfer to other facility  -     POCT urine dip  -     Urine culture; Future  -     Urine culture           Subjective      Patient is a 24 y/o female, presenting for lower back pain    Left lower back pain and into left lower abdomen. Pressure on bladder and feeling like she has to urinate frequently. Nausea without vomiting. Denies straining herself. Reports she is three weeks into her menstrual cycle. Describes the pain as sharp. Denies fevers, burning, itchy, or pain with urination. Urine dip in the office is negative for blood, leukocytes, nitrates, and bacteria.       Review of Systems   Constitutional:  Positive for diaphoresis. Negative for activity change, fatigue and fever.   HENT:  Negative for congestion, facial swelling, hearing loss, rhinorrhea, sinus pressure, sinus pain, sneezing, sore throat and voice change.    Eyes:  Negative for discharge and visual disturbance.   Respiratory:  Negative for cough, choking, chest tightness, shortness of breath, wheezing and stridor.    Cardiovascular:  Negative for chest pain, palpitations and leg swelling.   Gastrointestinal:  Positive for abdominal pain and nausea. Negative for abdominal distention, constipation, diarrhea and vomiting.        Left lower abdominal pain      Endocrine: Negative for polydipsia, polyphagia and polyuria.   Genitourinary:  Positive for flank pain and pelvic pain. Negative for difficulty urinating, dysuria, frequency and urgency.   Musculoskeletal:  Positive for back pain. Negative for arthralgias, gait problem, joint swelling, myalgias, neck pain and neck stiffness.   Skin:  Negative for color change, rash and wound.  "  Neurological:  Negative for dizziness, syncope, speech difficulty, weakness, light-headedness and headaches.   Hematological:  Negative for adenopathy. Does not bruise/bleed easily.   Psychiatric/Behavioral:  Negative for agitation, behavioral problems, confusion, hallucinations, sleep disturbance and suicidal ideas. The patient is not nervous/anxious.    All other systems reviewed and are negative.      Current Outpatient Medications on File Prior to Visit   Medication Sig    albuterol (ProAir HFA) 90 mcg/act inhaler Inhale 2 puffs every 4 (four) hours as needed for wheezing or shortness of breath    amphetamine-dextroamphetamine (ADDERALL XR, 15MG,) 15 MG 24 hr capsule Take 1 capsule (15 mg total) by mouth every morning Max Daily Amount: 15 mg    buPROPion (WELLBUTRIN XL) 150 mg 24 hr tablet Take 1 tablet (150 mg total) by mouth every morning    fexofenadine (ALLEGRA) 180 MG tablet Take 180 mg by mouth as needed    norgestimate-ethinyl estradiol (TriNessa, 28,) 0.18/0.215/0.25 MG-35 MCG per tablet Take 1 tablet by mouth daily       Objective     /86   Pulse (!) 111   Temp 98.7 °F (37.1 °C)   Resp 18   Ht 5' 6.5\" (1.689 m)   Wt 72.1 kg (159 lb)   LMP 02/27/2024   SpO2 98%   BMI 25.28 kg/m²     Physical Exam  Vitals and nursing note reviewed.   Constitutional:       General: She is in acute distress.      Appearance: She is well-developed. She is ill-appearing and diaphoretic.   Neck:      Thyroid: No thyromegaly.      Trachea: No tracheal deviation.   Cardiovascular:      Rate and Rhythm: Regular rhythm. Tachycardia present.      Heart sounds: Normal heart sounds. No murmur heard.  Pulmonary:      Effort: Pulmonary effort is normal. No respiratory distress.      Breath sounds: Normal breath sounds. No wheezing.   Abdominal:      Tenderness: There is abdominal tenderness in the suprapubic area and left lower quadrant.   Musculoskeletal:         General: No deformity. Normal range of motion.      " Cervical back: Normal range of motion and neck supple.      Lumbar back: Tenderness present. No deformity or bony tenderness. Normal range of motion.      Comments: Left lower back pain    Skin:     General: Skin is warm.      Findings: No erythema or rash.   Neurological:      Mental Status: She is alert and oriented to person, place, and time.   Psychiatric:         Mood and Affect: Mood normal.         Behavior: Behavior normal. Behavior is cooperative.         Thought Content: Thought content normal.         Judgment: Judgment normal.       PABLITO Guidry

## 2024-03-20 LAB — CALPROTECTIN STL-MCNT: 19 UG/G (ref 0–120)

## 2024-03-21 DIAGNOSIS — N39.0 URINARY TRACT INFECTION WITHOUT HEMATURIA, SITE UNSPECIFIED: Primary | ICD-10-CM

## 2024-03-21 LAB
BACTERIA UR CULT: ABNORMAL
BACTERIA UR CULT: ABNORMAL

## 2024-03-21 RX ORDER — NITROFURANTOIN 25; 75 MG/1; MG/1
100 CAPSULE ORAL 2 TIMES DAILY
Qty: 14 CAPSULE | Refills: 0 | Status: SHIPPED | OUTPATIENT
Start: 2024-03-21 | End: 2024-03-28

## 2024-03-21 NOTE — TELEPHONE ENCOUNTER
Sending back approval for Macrobid per labs from 3/19/24. Patient would like sent to [facility].     Macrobid 100mg BID x 7 days

## 2024-03-22 DIAGNOSIS — R10.9 LEFT FLANK PAIN: Primary | ICD-10-CM

## 2024-03-22 RX ORDER — ACETAMINOPHEN AND CODEINE PHOSPHATE 300; 30 MG/1; MG/1
1 TABLET ORAL EVERY 4 HOURS PRN
Qty: 30 TABLET | Refills: 0 | Status: SHIPPED | OUTPATIENT
Start: 2024-03-22

## 2024-04-18 ENCOUNTER — OFFICE VISIT (OUTPATIENT)
Dept: FAMILY MEDICINE CLINIC | Facility: CLINIC | Age: 24
End: 2024-04-18
Payer: COMMERCIAL

## 2024-04-18 VITALS
SYSTOLIC BLOOD PRESSURE: 128 MMHG | DIASTOLIC BLOOD PRESSURE: 70 MMHG | OXYGEN SATURATION: 97 % | BODY MASS INDEX: 24.96 KG/M2 | TEMPERATURE: 97.6 F | HEART RATE: 85 BPM | HEIGHT: 67 IN | WEIGHT: 159 LBS | RESPIRATION RATE: 18 BRPM

## 2024-04-18 DIAGNOSIS — F90.9 ADULT ADHD: ICD-10-CM

## 2024-04-18 DIAGNOSIS — F41.8 DEPRESSION WITH ANXIETY: ICD-10-CM

## 2024-04-18 PROCEDURE — 99213 OFFICE O/P EST LOW 20 MIN: CPT | Performed by: NURSE PRACTITIONER

## 2024-04-18 RX ORDER — BUPROPION HYDROCHLORIDE 150 MG/1
150 TABLET ORAL EVERY MORNING
Qty: 90 TABLET | Refills: 1 | Status: SHIPPED | OUTPATIENT
Start: 2024-04-18 | End: 2024-10-15

## 2024-04-18 RX ORDER — DEXTROAMPHETAMINE SACCHARATE, AMPHETAMINE ASPARTATE MONOHYDRATE, DEXTROAMPHETAMINE SULFATE AND AMPHETAMINE SULFATE 3.75; 3.75; 3.75; 3.75 MG/1; MG/1; MG/1; MG/1
15 CAPSULE, EXTENDED RELEASE ORAL EVERY MORNING
Qty: 30 CAPSULE | Refills: 0 | Status: SHIPPED | OUTPATIENT
Start: 2024-04-18

## 2024-04-18 NOTE — PROGRESS NOTES
Name: Sheila Oakes      : 2000      MRN: [id number]  Encounter Provider: PABLITO Guidry  Encounter Date: 2024   Encounter department: [facility] PRIMARY CARE    Assessment & Plan     1. Adult ADHD  -     amphetamine-dextroamphetamine (ADDERALL XR, 15MG,) 15 MG 24 hr capsule; Take 1 capsule (15 mg total) by mouth every morning Max Daily Amount: 15 mg    2. Depression with anxiety  -     buPROPion (WELLBUTRIN XL) 150 mg 24 hr tablet; Take 1 tablet (150 mg total) by mouth every morning           Subjective      Here for medcheck-  Adderall was switched to 15mg XR daily- doesn't take everyday if she wants to drink coffee-     Wellbutrin- Anxiety/depression- forgot a few days but is feeling overall improved. Reports some continued anxiety in big groups of people or overthinking/worse case scenario- both improved with starting Wellbutrin      Review of Systems   Constitutional:  Negative for activity change, diaphoresis, fatigue and fever.   HENT:  Negative for congestion, facial swelling, hearing loss, rhinorrhea, sinus pressure, sinus pain, sneezing, sore throat and voice change.    Eyes:  Negative for discharge and visual disturbance.   Respiratory:  Negative for cough, choking, chest tightness, shortness of breath, wheezing and stridor.    Cardiovascular:  Negative for chest pain, palpitations and leg swelling.   Gastrointestinal:  Negative for abdominal distention, abdominal pain, constipation, diarrhea, nausea and vomiting.   Endocrine: Negative for polydipsia, polyphagia and polyuria.   Genitourinary:  Negative for difficulty urinating, dysuria, frequency and urgency.   Musculoskeletal:  Negative for arthralgias, back pain, gait problem, joint swelling, myalgias, neck pain and neck stiffness.   Skin:  Negative for color change, rash and wound.   Neurological:  Negative for dizziness, syncope, speech difficulty, weakness, light-headedness and headaches.   Hematological:  Negative  "for adenopathy. Does not bruise/bleed easily.   Psychiatric/Behavioral:  Positive for decreased concentration and dysphoric mood. Negative for agitation, behavioral problems, confusion, hallucinations, sleep disturbance and suicidal ideas. The patient is nervous/anxious.        Current Outpatient Medications on File Prior to Visit   Medication Sig    acetaminophen-codeine (TYLENOL with CODEINE #3) 300-30 MG per tablet Take 1 tablet by mouth every 4 (four) hours as needed for moderate pain or severe pain    albuterol (ProAir HFA) 90 mcg/act inhaler Inhale 2 puffs every 4 (four) hours as needed for wheezing or shortness of breath    fexofenadine (ALLEGRA) 180 MG tablet Take 180 mg by mouth as needed    norgestimate-ethinyl estradiol (TriNessa, 28,) 0.18/0.215/0.25 MG-35 MCG per tablet Take 1 tablet by mouth daily    [DISCONTINUED] amphetamine-dextroamphetamine (ADDERALL XR, 15MG,) 15 MG 24 hr capsule Take 1 capsule (15 mg total) by mouth every morning Max Daily Amount: 15 mg    [DISCONTINUED] buPROPion (WELLBUTRIN XL) 150 mg 24 hr tablet Take 1 tablet (150 mg total) by mouth every morning       Objective     /70   Pulse 85   Temp 97.6 °F (36.4 °C)   Resp 18   Ht 5' 6.5\" (1.689 m)   Wt 72.1 kg (159 lb)   LMP 02/27/2024   SpO2 97%   BMI 25.28 kg/m²     Physical Exam  Vitals and nursing note reviewed.   Constitutional:       General: She is not in acute distress.     Appearance: She is well-developed. She is not diaphoretic.   Neck:      Thyroid: No thyromegaly.      Trachea: No tracheal deviation.   Cardiovascular:      Rate and Rhythm: Normal rate and regular rhythm.      Heart sounds: Normal heart sounds. No murmur heard.  Pulmonary:      Effort: Pulmonary effort is normal. No respiratory distress.      Breath sounds: Normal breath sounds. No wheezing.   Musculoskeletal:         General: No tenderness or deformity. Normal range of motion.      Cervical back: Normal range of motion and neck supple. "   Skin:     General: Skin is warm and dry.      Findings: No erythema or rash.   Neurological:      Mental Status: She is alert and oriented to person, place, and time.   Psychiatric:         Mood and Affect: Mood normal.         Behavior: Behavior normal. Behavior is cooperative.         Thought Content: Thought content normal.         Judgment: Judgment normal.       PABLITO Guidry

## 2024-05-13 ENCOUNTER — OFFICE VISIT (OUTPATIENT)
Dept: URGENT CARE | Facility: CLINIC | Age: 24
End: 2024-05-13
Payer: COMMERCIAL

## 2024-05-13 VITALS
SYSTOLIC BLOOD PRESSURE: 124 MMHG | DIASTOLIC BLOOD PRESSURE: 84 MMHG | OXYGEN SATURATION: 98 % | TEMPERATURE: 97.6 F | RESPIRATION RATE: 18 BRPM | WEIGHT: 159 LBS | HEART RATE: 91 BPM | BODY MASS INDEX: 24.96 KG/M2 | HEIGHT: 67 IN

## 2024-05-13 DIAGNOSIS — R07.89 CHEST TIGHTNESS: ICD-10-CM

## 2024-05-13 DIAGNOSIS — J02.9 SORE THROAT: Primary | ICD-10-CM

## 2024-05-13 PROCEDURE — S9083 URGENT CARE CENTER GLOBAL: HCPCS | Performed by: NURSE PRACTITIONER

## 2024-05-13 PROCEDURE — G0382 LEV 3 HOSP TYPE B ED VISIT: HCPCS | Performed by: NURSE PRACTITIONER

## 2024-05-13 RX ORDER — AMOXICILLIN AND CLAVULANATE POTASSIUM 875; 125 MG/1; MG/1
1 TABLET, FILM COATED ORAL EVERY 12 HOURS SCHEDULED
Qty: 20 TABLET | Refills: 0 | Status: SHIPPED | OUTPATIENT
Start: 2024-05-13 | End: 2024-05-23

## 2024-05-13 RX ORDER — PREDNISONE 20 MG/1
20 TABLET ORAL 2 TIMES DAILY WITH MEALS
Qty: 10 TABLET | Refills: 0 | Status: SHIPPED | OUTPATIENT
Start: 2024-05-13 | End: 2024-05-18

## 2024-05-13 NOTE — LETTER
May 13, 2024     Patient: Sheila Oakes   YOB: 2000   Date of Visit: 5/13/2024       To Whom It May Concern:    It is my medical opinion that Sheila Oakes may return to work on 514 or 5/15, depending on symptoms.  Please excuse for time missed due to acute illness .    If you have any questions or concerns, please don't hesitate to call.         Sincerely,        PABLITO Rosas    CC: No Recipients

## 2024-05-13 NOTE — PROGRESS NOTES
[facility]        NAME: Sheila Oakes is a 23 y.o. female  : 2000    MRN: [id number]  DATE: May 13, 2024  TIME: 10:02 AM      Assessment and Plan     Sore throat [J02.9]  1. Sore throat  amoxicillin-clavulanate (AUGMENTIN) 875-125 mg per tablet    predniSONE 20 mg tablet      2. Chest tightness  amoxicillin-clavulanate (AUGMENTIN) 875-125 mg per tablet    predniSONE 20 mg tablet            Patient Instructions     Patient Instructions   Pharyngitis   AMBULATORY CARE:   Pharyngitis , or sore throat, is inflammation of the tissues and structures in your pharynx (throat). Pharyngitis is most often caused by bacteria or a virus. Other causes include smoking, allergies, or acid reflux.  Signs and symptoms  depend on the cause of your pharyngitis. You may have any of the following:  Sore throat or pain when you swallow    Fever, chills, and body aches    Hoarse or raspy voice    Cough, runny or stuffy nose, itchy or watery eyes    Headache    Upset stomach and loss of appetite    Whitish-yellow patches on the back of the throat    Tender, swollen lumps on the sides of the neck    Call your local emergency number (911 in the [place]) if:   You have trouble breathing or swallowing because your throat is swollen.      Seek care immediately if:   You are drooling because it hurts too much to swallow.    Your fever is higher than 102?F (39?C) or lasts longer than 3 days.    You are confused.    You taste blood.    Call your doctor if:   Your throat pain gets worse.    You have a painful lump in your throat that does not go away after 5 days.    Your symptoms do not improve after 5 days.    You have questions or concerns about your condition or care.    Treatment:  Viral pharyngitis will go away on its own without treatment. Your sore throat should start to feel better in 3 to 5 days. You may need any of the following:  Antibiotics  treat a bacterial infection.    NSAIDs  help decrease swelling and pain or  fever. This medicine is available with or without a doctor's order. NSAIDs can cause stomach bleeding or kidney problems in certain people. If you take blood thinner medicine, always ask your healthcare provider if NSAIDs are safe for you. Always read the medicine label and follow directions.    Acetaminophen  decreases pain and fever. It is available without a doctor's order. Ask how much to take and how often to take it. Follow directions. Read the labels of all other medicines you are using to see if they also contain acetaminophen, or ask your doctor or pharmacist. Acetaminophen can cause liver damage if not taken correctly.    Manage your symptoms:   Gargle salt water.  Mix ¼ teaspoon salt in an 8 ounce glass of warm water and gargle. Do not swallow. Salt water may help decrease swelling in your throat.    Drink liquids as directed.  You may need to drink more liquids than usual. Liquids may help soothe your throat and prevent dehydration. Ask how much liquid to drink each day and which liquids are best for you.    Use a cool mist humidifier.  This will add moisture to the air and help decrease your cough.    Soothe your throat.  Cough drops, ice, soft foods, or popsicles may help decrease throat pain.    Prevent the spread of pharyngitis:  Cover your mouth and nose when you cough or sneeze. Do not share food or drinks. Wash your hands often. Use soap and water. If soap and water are unavailable, use an alcohol-based hand .       Follow up with your doctor as directed:  Write down your questions so you remember to ask them during your visits.  © Copyright [employer] 2023 Information is for End User's use only and may not be sold, redistributed or otherwise used for commercial purposes.  The above information is an  only. It is not intended as medical advice for individual conditions or treatments. Talk to your doctor, nurse or pharmacist before following any medical regimen to see if it is  safe and effective for you.    Acute Bronchitis   AMBULATORY CARE:   Acute bronchitis  is swelling and irritation in your lungs. It is usually caused by a virus and most often happens in the winter. Bronchitis may also be caused by bacteria or by a chemical irritant, such as smoke.  Common symptoms:   Cough that lasts up to 3 weeks    Runny or stuffy nose    Hoarseness, sore throat    Fever    Feeling more tired than usual, and body aches    Wheezing or pain when you breathe or cough    Seek care immediately if:   You cough up blood.    Your lips or fingernails turn blue.    You feel like you are not getting enough air when you breathe.    Call your doctor if:   Your symptoms do not go away or get worse, even after treatment.    Your cough does not get better within 4 weeks.    You have questions or concerns about your condition or care.    Medicines:  You may need any of the following:  Cough suppressants  decrease your urge to cough.    Decongestants  help loosen mucus in your lungs and make it easier to cough up. This can help you breathe easier.    Inhalers  may be given. Your healthcare provider may give you one or more inhalers to help you breathe easier and cough less. An inhaler gives you medicine to open your airways. Ask your healthcare provider to show you how to use your inhaler correctly.         Antiviral medicine  treats infections caused by a virus.    Antibiotics  may be given if your bronchitis is caused by bacteria or if you have lung condition.    Acetaminophen  decreases pain and fever. It is available without a doctor's order. Ask how much to take and how often to take it. Follow directions. Read the labels of all other medicines you are using to see if they also contain acetaminophen, or ask your doctor or pharmacist. Acetaminophen can cause liver damage if not taken correctly.    NSAIDs  help decrease swelling and pain or fever. This medicine is available with or without a doctor's order.  NSAIDs can cause stomach bleeding or kidney problems in certain people. If you take blood thinner medicine, always ask your healthcare provider if NSAIDs are safe for you. Always read the medicine label and follow directions.    Self-care:   Drink liquids as directed.  You may need to drink more liquids than usual to stay hydrated. Ask how much liquid to drink each day and which liquids are best for you.    Use a cool mist humidifier.  This increases air moisture in your home. This may make it easier for you to breathe and help decrease your cough.     Get more rest.  Rest helps your body to heal. Slowly start to do more each day. Rest when you feel it is needed.    Prevent acute bronchitis:       Ask about vaccines you may need.  Get a flu vaccine each year as soon as recommended, usually in September or October. Ask your healthcare provider if you should also get a pneumonia or COVID-19 vaccine. Your healthcare provider can tell you if you should also get other vaccines, and when to get them.    Prevent the spread of germs.  You can decrease your risk for acute bronchitis and other illnesses by doing the following:    Wash your hands often with soap and water. Carry germ-killing hand lotion or gel with you. You can use the lotion or gel to clean your hands when soap and water are not available.         Do not touch your eyes, nose, or mouth unless you have washed your hands first.    Always cover your mouth when you cough to prevent the spread of germs. It is best to cough into a tissue or your shirt sleeve instead of into your hand. Ask those around you to cover their mouths when they cough.    Try to avoid people who have a cold or the flu. If you are sick, stay away from others as much as possible.    Avoid irritants in the air.  Avoid chemicals, fumes, and dust. Wear a face mask if you must work around dust or fumes. Stay inside on days when air pollution levels are high. If you have allergies, stay inside  when pollen counts are high. Do not use aerosol products, such as spray-on deodorant, bug spray, and hair spray.    Do not smoke or be around others who are smoking.  Nicotine and other chemicals in cigarettes and cigars can cause lung damage. Ask your healthcare provider for information if you currently smoke and need help to quit. E-cigarettes or smokeless tobacco still contain nicotine. Talk to your healthcare provider before you use these products.  Follow up with your doctor as directed:  Write down questions you have so you will remember to ask them during your follow-up visits.  © Copyright [employer] 2023 Information is for End User's use only and may not be sold, redistributed or otherwise used for commercial purposes.  The above information is an  only. It is not intended as medical advice for individual conditions or treatments. Talk to your doctor, nurse or pharmacist before following any medical regimen to see if it is safe and effective for you.    ]  Follow up with PCP in 3-5 days.  Proceed to  ER if symptoms worsen.    Chief Complaint     Chief Complaint   Patient presents with    Sore Throat     Patient c/o sore throat, body aches, chills and slight chest tightness that started yesterday.         History of Present Illness     Patient notes onset of symptoms yesterday with sore throat quickly getting worse not better.  Tonsils removed when quite young, age 6-7, so does not remember what strep feels like.  Notes that she usually gets chest tightness whenever ill.  Did have bronchitis this past winter.  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        Review of Systems     Review of Systems   Constitutional:  Positive for chills.   HENT:  Positive for sore throat and trouble swallowing.    Respiratory:  Positive for chest tightness.    Musculoskeletal:  Positive for myalgias.   All other systems reviewed and are negative.        Current Medications       Current Outpatient  Medications:     amoxicillin-clavulanate (AUGMENTIN) 875-125 mg per tablet, Take 1 tablet by mouth every 12 (twelve) hours for 10 days, Disp: 20 tablet, Rfl: 0    amphetamine-dextroamphetamine (ADDERALL XR, 15MG,) 15 MG 24 hr capsule, Take 1 capsule (15 mg total) by mouth every morning Max Daily Amount: 15 mg, Disp: 30 capsule, Rfl: 0    fexofenadine (ALLEGRA) 180 MG tablet, Take 180 mg by mouth as needed, Disp: , Rfl:     norgestimate-ethinyl estradiol (TriNessa, 28,) 0.18/0.215/0.25 MG-35 MCG per tablet, Take 1 tablet by mouth daily, Disp: 84 tablet, Rfl: 3    predniSONE 20 mg tablet, Take 1 tablet (20 mg total) by mouth 2 (two) times a day with meals for 5 days, Disp: 10 tablet, Rfl: 0    acetaminophen-codeine (TYLENOL with CODEINE #3) 300-30 MG per tablet, Take 1 tablet by mouth every 4 (four) hours as needed for moderate pain or severe pain (Patient not taking: Reported on 5/13/2024), Disp: 30 tablet, Rfl: 0    albuterol (ProAir HFA) 90 mcg/act inhaler, Inhale 2 puffs every 4 (four) hours as needed for wheezing or shortness of breath (Patient not taking: Reported on 5/13/2024), Disp: 8.5 g, Rfl: 1    buPROPion (WELLBUTRIN XL) 150 mg 24 hr tablet, Take 1 tablet (150 mg total) by mouth every morning (Patient not taking: Reported on 5/13/2024), Disp: 90 tablet, Rfl: 1    Current Allergies     Allergies as of 05/13/2024 - Reviewed 05/13/2024   Allergen Reaction Noted    Mupirocin  03/18/2019    Iv contrast [iodinated contrast media] Hives 03/19/2024    Sulfa antibiotics Rash 12/19/2018              The following portions of the patient's history were reviewed and updated as appropriate: allergies, current medications, past family history, past medical history, past social history, past surgical history and problem list.     History reviewed. No pertinent past medical history.    Past Surgical History:   Procedure Laterality Date    TONSILECTOMY AND ADNOIDECTOMY      TYMPANOSTOMY TUBE PLACEMENT      6 sets - last  "time as a child    WISDOM TOOTH EXTRACTION         Family History   Problem Relation Age of Onset    Fibroids Mother     GI problems Father     Seizures Sister     Arthritis Maternal Grandmother          Medications have been verified.        Objective     /84   Pulse 91   Temp 97.6 °F (36.4 °C) (Temporal)   Resp 18   Ht 5' 6.5\" (1.689 m)   Wt 72.1 kg (159 lb)   LMP 04/24/2024 (Approximate)   SpO2 98%   BMI 25.28 kg/m²   Patient's last menstrual period was 04/24/2024 (approximate).         Physical Exam     Physical Exam  Vitals and nursing note reviewed.   Constitutional:       General: She is not in acute distress.     Appearance: Normal appearance. She is well-developed. She is ill-appearing. She is not toxic-appearing or diaphoretic.   HENT:      Head: Normocephalic and atraumatic.      Right Ear: Tympanic membrane and ear canal normal. Tympanic membrane is not erythematous.      Left Ear: Tympanic membrane and ear canal normal. Tympanic membrane is not erythematous.      Mouth/Throat:      Mouth: Mucous membranes are moist.      Pharynx: Posterior oropharyngeal erythema present. No oropharyngeal exudate.      Tonsils: 0 on the right. 0 on the left.   Eyes:      Pupils: Pupils are equal, round, and reactive to light.   Pulmonary:      Effort: Pulmonary effort is normal. No respiratory distress.      Breath sounds: Normal breath sounds. No stridor. No wheezing (no wheezes but slightly tight throughout; overall good air movement), rhonchi or rales.   Abdominal:      General: There is no distension.      Palpations: Abdomen is soft.   Musculoskeletal:         General: Normal range of motion.      Cervical back: Normal range of motion and neck supple.   Lymphadenopathy:      Cervical: Cervical adenopathy present.   Skin:     General: Skin is warm and dry.      Capillary Refill: Capillary refill takes less than 2 seconds.   Neurological:      General: No focal deficit present.      Mental Status: She is " alert and oriented to person, place, and time.   Psychiatric:         Mood and Affect: Mood and affect normal.         Behavior: Behavior normal. Behavior is cooperative.         Thought Content: Thought content normal.         Judgment: Judgment normal.

## 2024-05-15 ENCOUNTER — OFFICE VISIT (OUTPATIENT)
Dept: URGENT CARE | Facility: CLINIC | Age: 24
End: 2024-05-15
Payer: COMMERCIAL

## 2024-05-15 ENCOUNTER — APPOINTMENT (OUTPATIENT)
Dept: RADIOLOGY | Facility: CLINIC | Age: 24
End: 2024-05-15
Payer: COMMERCIAL

## 2024-05-15 VITALS
TEMPERATURE: 97.9 F | OXYGEN SATURATION: 95 % | HEART RATE: 92 BPM | DIASTOLIC BLOOD PRESSURE: 70 MMHG | RESPIRATION RATE: 18 BRPM | SYSTOLIC BLOOD PRESSURE: 121 MMHG

## 2024-05-15 DIAGNOSIS — J20.9 ACUTE BRONCHITIS, UNSPECIFIED ORGANISM: ICD-10-CM

## 2024-05-15 DIAGNOSIS — J20.9 ACUTE BRONCHITIS, UNSPECIFIED ORGANISM: Primary | ICD-10-CM

## 2024-05-15 PROCEDURE — 71046 X-RAY EXAM CHEST 2 VIEWS: CPT

## 2024-05-15 PROCEDURE — S9083 URGENT CARE CENTER GLOBAL: HCPCS | Performed by: NURSE PRACTITIONER

## 2024-05-15 PROCEDURE — G0383 LEV 4 HOSP TYPE B ED VISIT: HCPCS | Performed by: NURSE PRACTITIONER

## 2024-05-15 RX ORDER — ALBUTEROL SULFATE 90 UG/1
2 AEROSOL, METERED RESPIRATORY (INHALATION) EVERY 4 HOURS PRN
Qty: 8.5 G | Refills: 1 | Status: SHIPPED | OUTPATIENT
Start: 2024-05-15

## 2024-05-15 NOTE — LETTER
May 15, 2024     Patient: Sheila Oakes   YOB: 2000   Date of Visit: 5/15/2024       To Whom It May Concern:    It is my medical opinion that Sheila Oakes may return to work on 5/17 or 5/20, depending on symptoms.  Please excuse for time missed due to acute illness .    If you have any questions or concerns, please don't hesitate to call.         Sincerely,        PABLITO Rosas    CC: No Recipients

## 2024-05-15 NOTE — PROGRESS NOTES
[facility]        NAME: Sheila Oakes is a 23 y.o. female  : 2000    MRN: [id number]  DATE: May 15, 2024  TIME: 1:35 PM      Assessment and Plan     Acute bronchitis, unspecified organism [J20.9]  1. Acute bronchitis, unspecified organism  XR chest pa & lateral    albuterol (ProAir HFA) 90 mcg/act inhaler            Patient Instructions     Patient Instructions   Acute Bronchitis   AMBULATORY CARE:   Acute bronchitis  is swelling and irritation in your lungs. It is usually caused by a virus and most often happens in the winter. Bronchitis may also be caused by bacteria or by a chemical irritant, such as smoke.  Common symptoms:   Cough that lasts up to 3 weeks    Runny or stuffy nose    Hoarseness, sore throat    Fever    Feeling more tired than usual, and body aches    Wheezing or pain when you breathe or cough    Seek care immediately if:   You cough up blood.    Your lips or fingernails turn blue.    You feel like you are not getting enough air when you breathe.    Call your doctor if:   Your symptoms do not go away or get worse, even after treatment.    Your cough does not get better within 4 weeks.    You have questions or concerns about your condition or care.    Medicines:  You may need any of the following:  Cough suppressants  decrease your urge to cough.    Decongestants  help loosen mucus in your lungs and make it easier to cough up. This can help you breathe easier.    Inhalers  may be given. Your healthcare provider may give you one or more inhalers to help you breathe easier and cough less. An inhaler gives you medicine to open your airways. Ask your healthcare provider to show you how to use your inhaler correctly.         Antiviral medicine  treats infections caused by a virus.    Antibiotics  may be given if your bronchitis is caused by bacteria or if you have lung condition.    Acetaminophen  decreases pain and fever. It is available without a doctor's order. Ask how much to  take and how often to take it. Follow directions. Read the labels of all other medicines you are using to see if they also contain acetaminophen, or ask your doctor or pharmacist. Acetaminophen can cause liver damage if not taken correctly.    NSAIDs  help decrease swelling and pain or fever. This medicine is available with or without a doctor's order. NSAIDs can cause stomach bleeding or kidney problems in certain people. If you take blood thinner medicine, always ask your healthcare provider if NSAIDs are safe for you. Always read the medicine label and follow directions.    Self-care:   Drink liquids as directed.  You may need to drink more liquids than usual to stay hydrated. Ask how much liquid to drink each day and which liquids are best for you.    Use a cool mist humidifier.  This increases air moisture in your home. This may make it easier for you to breathe and help decrease your cough.     Get more rest.  Rest helps your body to heal. Slowly start to do more each day. Rest when you feel it is needed.    Prevent acute bronchitis:       Ask about vaccines you may need.  Get a flu vaccine each year as soon as recommended, usually in September or October. Ask your healthcare provider if you should also get a pneumonia or COVID-19 vaccine. Your healthcare provider can tell you if you should also get other vaccines, and when to get them.    Prevent the spread of germs.  You can decrease your risk for acute bronchitis and other illnesses by doing the following:    Wash your hands often with soap and water. Carry germ-killing hand lotion or gel with you. You can use the lotion or gel to clean your hands when soap and water are not available.         Do not touch your eyes, nose, or mouth unless you have washed your hands first.    Always cover your mouth when you cough to prevent the spread of germs. It is best to cough into a tissue or your shirt sleeve instead of into your hand. Ask those around you to cover  their mouths when they cough.    Try to avoid people who have a cold or the flu. If you are sick, stay away from others as much as possible.    Avoid irritants in the air.  Avoid chemicals, fumes, and dust. Wear a face mask if you must work around dust or fumes. Stay inside on days when air pollution levels are high. If you have allergies, stay inside when pollen counts are high. Do not use aerosol products, such as spray-on deodorant, bug spray, and hair spray.    Do not smoke or be around others who are smoking.  Nicotine and other chemicals in cigarettes and cigars can cause lung damage. Ask your healthcare provider for information if you currently smoke and need help to quit. E-cigarettes or smokeless tobacco still contain nicotine. Talk to your healthcare provider before you use these products.  Follow up with your doctor as directed:  Write down questions you have so you will remember to ask them during your follow-up visits.  © Copyright [employer] 2023 Information is for End User's use only and may not be sold, redistributed or otherwise used for commercial purposes.  The above information is an  only. It is not intended as medical advice for individual conditions or treatments. Talk to your doctor, nurse or pharmacist before following any medical regimen to see if it is safe and effective for you.    Exercise-Induced Bronchoconstriction   AMBULATORY CARE:   Exercise-induced bronchoconstriction  (EIB) is a temporary inflammation and narrowing of your airways. EIB may also be called exercise-induced asthma. EIB occurs during or 5 to 10 minutes after strenuous exercise. Irritants such as pollution, allergens, or cold, dry air may trigger an EIB attack. Your risk of EIB is increased if you have asthma. You may still have EIB even if you do not have asthma.   Common symptoms include the following:  You may wheeze, cough, have chest pain, tightness, or shortness of breath during or after you  exercise. You may also feel out of shape when you exercise, even though you are in good physical condition.  Seek immediate care if:   You have severe shortness of breath.    Your lips or nails are blue or gray.    The skin around your neck and ribs pulls in when you breathe.    Contact your healthcare provider if:   You are use short-acting medicine every day, or more frequently than usual.    Your symptoms become worse.    You have questions or concerns about your condition or care.    Medicines:   Medicines  may be given to help decrease inflammation, open airways, and make it easier to breathe. You will use an inhaler to take these medicines. Your healthcare provider will tell you the kind of inhaler to use and show you how to use it. Short-acting medicine is taken 15 minutes before you exercise, or when you have symptoms. Long-acting medicine is taken daily to help prevent an exercise-induced attack. You may also need medicine to control allergies that trigger your symptoms.     Take your medicine as directed.  Contact your healthcare provider if you think your medicine is not helping or if you have side effects. Tell your provider if you are allergic to any medicine. Keep a list of the medicines, vitamins, and herbs you take. Include the amounts, and when and why you take them. Bring the list or the pill bottles to follow-up visits. Carry your medicine list with you in case of an emergency.    Prevent EIB attacks:   Avoid known triggers , such as dust or pollen.     Choose exercise that requires only short bursts of intense breathing, if possible.  Examples include volleyball, gymnastics, baseball, wrestling, or sprinting. Exercise that requires intense breathing for long periods is more likely to trigger EIB.    Warm up before you exercise. Do moderate exercise, such as brisk walking, for 10 minutes before you do strenuous exercise.     Wear a mask over your mouth when you exercise in cold weather.  This will  help warm the air you breathe.    Follow up with your doctor as directed:  Write down your questions so you remember to ask them during your visits.  © Copyright [employer] 2023 Information is for End User's use only and may not be sold, redistributed or otherwise used for commercial purposes.  The above information is an  only. It is not intended as medical advice for individual conditions or treatments. Talk to your doctor, nurse or pharmacist before following any medical regimen to see if it is safe and effective for you.      Follow up with PCP in 3-5 days.  Proceed to  ER if symptoms worsen.    Chief Complaint     Chief Complaint   Patient presents with    Cough     And chest congestion was seen in [facility] now on Monday          History of Present Illness     Patient was able to  and start the Augmentin and prednisone on Monday.  Sore throat was present Monday imparted Tuesday; feels better now.  However, her chest symptoms have persisted and actually worsened with increased tightness and burning, worse when she coughs.    No history of asthma.  She does notes sometimes feeling similar sensation when she exercises on the treadmill at the gym.  She asks if bronchitis can impact back.  We discussed that no, unless acutely ill it would not but that there are different types of asthma and some can develop with time and not necessarily be present since childhood.  We discussed that for patients that experience consistent symptoms when the exercise that the albuterol is often used shortly before exercise is started, 30 minutes or so prior.      Review of Systems     Review of Systems   HENT:  Positive for sore throat (mostly resolved--feeling much better).    Respiratory:  Positive for cough and chest tightness (worse since Monday).    All other systems reviewed and are negative.        Current Medications       Current Outpatient Medications:     albuterol (ProAir HFA) 90 mcg/act inhaler,  Inhale 2 puffs every 4 (four) hours as needed for wheezing or shortness of breath, Disp: 8.5 g, Rfl: 1    acetaminophen-codeine (TYLENOL with CODEINE #3) 300-30 MG per tablet, Take 1 tablet by mouth every 4 (four) hours as needed for moderate pain or severe pain (Patient not taking: Reported on 5/13/2024), Disp: 30 tablet, Rfl: 0    amoxicillin-clavulanate (AUGMENTIN) 875-125 mg per tablet, Take 1 tablet by mouth every 12 (twelve) hours for 10 days, Disp: 20 tablet, Rfl: 0    amphetamine-dextroamphetamine (ADDERALL XR, 15MG,) 15 MG 24 hr capsule, Take 1 capsule (15 mg total) by mouth every morning Max Daily Amount: 15 mg, Disp: 30 capsule, Rfl: 0    buPROPion (WELLBUTRIN XL) 150 mg 24 hr tablet, Take 1 tablet (150 mg total) by mouth every morning (Patient not taking: Reported on 5/13/2024), Disp: 90 tablet, Rfl: 1    fexofenadine (ALLEGRA) 180 MG tablet, Take 180 mg by mouth as needed, Disp: , Rfl:     norgestimate-ethinyl estradiol (TriNessa, 28,) 0.18/0.215/0.25 MG-35 MCG per tablet, Take 1 tablet by mouth daily, Disp: 84 tablet, Rfl: 3    predniSONE 20 mg tablet, Take 1 tablet (20 mg total) by mouth 2 (two) times a day with meals for 5 days, Disp: 10 tablet, Rfl: 0    Current Allergies     Allergies as of 05/15/2024 - Reviewed 05/15/2024   Allergen Reaction Noted    Mupirocin  03/18/2019    Iv contrast [iodinated contrast media] Hives 03/19/2024    Sulfa antibiotics Rash 12/19/2018              The following portions of the patient's history were reviewed and updated as appropriate: allergies, current medications, past family history, past medical history, past social history, past surgical history and problem list.     History reviewed. No pertinent past medical history.    Past Surgical History:   Procedure Laterality Date    TONSILECTOMY AND ADNOIDECTOMY      TYMPANOSTOMY TUBE PLACEMENT      6 sets - last time as a child    WISDOM TOOTH EXTRACTION         Family History   Problem Relation Age of Onset     Fibroids Mother     GI problems Father     Seizures Sister     Arthritis Maternal Grandmother          Medications have been verified.        Objective     /70   Pulse 92   Temp 97.9 °F (36.6 °C)   Resp 18   LMP 04/24/2024 (Approximate)   SpO2 95%   Patient's last menstrual period was 04/24/2024 (approximate).         Physical Exam     Physical Exam  Vitals and nursing note reviewed.   Constitutional:       General: She is not in acute distress.     Appearance: Normal appearance. She is well-developed. She is not toxic-appearing or diaphoretic.   HENT:      Head: Normocephalic and atraumatic.      Nose: Nose normal.      Mouth/Throat:      Mouth: Mucous membranes are moist.      Pharynx: Oropharynx is clear. Uvula midline. No pharyngeal swelling, oropharyngeal exudate or posterior oropharyngeal erythema (normal appearing now).      Tonsils: 0 on the right. 0 on the left.   Eyes:      Pupils: Pupils are equal, round, and reactive to light.   Cardiovascular:      Rate and Rhythm: Normal rate and regular rhythm. No extrasystoles are present.     Heart sounds: Normal heart sounds, S1 normal and S2 normal. No murmur heard.     No friction rub.   Pulmonary:      Effort: Pulmonary effort is normal. No tachypnea, bradypnea, accessory muscle usage, prolonged expiration, respiratory distress or retractions.      Breath sounds: Normal air entry. No stridor or decreased air movement. Wheezing (mild exp wheezes scattered throughout) present. No decreased breath sounds, rhonchi or rales.   Abdominal:      General: There is no distension.      Palpations: Abdomen is soft.   Musculoskeletal:         General: Normal range of motion.      Cervical back: Normal range of motion and neck supple.   Skin:     General: Skin is warm and dry.      Capillary Refill: Capillary refill takes less than 2 seconds.   Neurological:      General: No focal deficit present.      Mental Status: She is alert and oriented to person, place, and  time.   Psychiatric:         Mood and Affect: Mood and affect normal.         Behavior: Behavior normal. Behavior is cooperative.         Thought Content: Thought content normal.         Judgment: Judgment normal.

## 2024-05-15 NOTE — PATIENT INSTRUCTIONS
Acute Bronchitis   AMBULATORY CARE:   Acute bronchitis  is swelling and irritation in your lungs. It is usually caused by a virus and most often happens in the winter. Bronchitis may also be caused by bacteria or by a chemical irritant, such as smoke.  Common symptoms:   Cough that lasts up to 3 weeks    Runny or stuffy nose    Hoarseness, sore throat    Fever    Feeling more tired than usual, and body aches    Wheezing or pain when you breathe or cough    Seek care immediately if:   You cough up blood.    Your lips or fingernails turn blue.    You feel like you are not getting enough air when you breathe.    Call your doctor if:   Your symptoms do not go away or get worse, even after treatment.    Your cough does not get better within 4 weeks.    You have questions or concerns about your condition or care.    Medicines:  You may need any of the following:  Cough suppressants  decrease your urge to cough.    Decongestants  help loosen mucus in your lungs and make it easier to cough up. This can help you breathe easier.    Inhalers  may be given. Your healthcare provider may give you one or more inhalers to help you breathe easier and cough less. An inhaler gives you medicine to open your airways. Ask your healthcare provider to show you how to use your inhaler correctly.         Antiviral medicine  treats infections caused by a virus.    Antibiotics  may be given if your bronchitis is caused by bacteria or if you have lung condition.    Acetaminophen  decreases pain and fever. It is available without a doctor's order. Ask how much to take and how often to take it. Follow directions. Read the labels of all other medicines you are using to see if they also contain acetaminophen, or ask your doctor or pharmacist. Acetaminophen can cause liver damage if not taken correctly.    NSAIDs  help decrease swelling and pain or fever. This medicine is available with or without a doctor's order. NSAIDs can cause stomach bleeding  or kidney problems in certain people. If you take blood thinner medicine, always ask your healthcare provider if NSAIDs are safe for you. Always read the medicine label and follow directions.    Self-care:   Drink liquids as directed.  You may need to drink more liquids than usual to stay hydrated. Ask how much liquid to drink each day and which liquids are best for you.    Use a cool mist humidifier.  This increases air moisture in your home. This may make it easier for you to breathe and help decrease your cough.     Get more rest.  Rest helps your body to heal. Slowly start to do more each day. Rest when you feel it is needed.    Prevent acute bronchitis:       Ask about vaccines you may need.  Get a flu vaccine each year as soon as recommended, usually in September or October. Ask your healthcare provider if you should also get a pneumonia or COVID-19 vaccine. Your healthcare provider can tell you if you should also get other vaccines, and when to get them.    Prevent the spread of germs.  You can decrease your risk for acute bronchitis and other illnesses by doing the following:    Wash your hands often with soap and water. Carry germ-killing hand lotion or gel with you. You can use the lotion or gel to clean your hands when soap and water are not available.         Do not touch your eyes, nose, or mouth unless you have washed your hands first.    Always cover your mouth when you cough to prevent the spread of germs. It is best to cough into a tissue or your shirt sleeve instead of into your hand. Ask those around you to cover their mouths when they cough.    Try to avoid people who have a cold or the flu. If you are sick, stay away from others as much as possible.    Avoid irritants in the air.  Avoid chemicals, fumes, and dust. Wear a face mask if you must work around dust or fumes. Stay inside on days when air pollution levels are high. If you have allergies, stay inside when pollen counts are high. Do not  use aerosol products, such as spray-on deodorant, bug spray, and hair spray.    Do not smoke or be around others who are smoking.  Nicotine and other chemicals in cigarettes and cigars can cause lung damage. Ask your healthcare provider for information if you currently smoke and need help to quit. E-cigarettes or smokeless tobacco still contain nicotine. Talk to your healthcare provider before you use these products.  Follow up with your doctor as directed:  Write down questions you have so you will remember to ask them during your follow-up visits.  © Copyright [employer] 2023 Information is for End User's use only and may not be sold, redistributed or otherwise used for commercial purposes.  The above information is an  only. It is not intended as medical advice for individual conditions or treatments. Talk to your doctor, nurse or pharmacist before following any medical regimen to see if it is safe and effective for you.    Exercise-Induced Bronchoconstriction   AMBULATORY CARE:   Exercise-induced bronchoconstriction  (EIB) is a temporary inflammation and narrowing of your airways. EIB may also be called exercise-induced asthma. EIB occurs during or 5 to 10 minutes after strenuous exercise. Irritants such as pollution, allergens, or cold, dry air may trigger an EIB attack. Your risk of EIB is increased if you have asthma. You may still have EIB even if you do not have asthma.   Common symptoms include the following:  You may wheeze, cough, have chest pain, tightness, or shortness of breath during or after you exercise. You may also feel out of shape when you exercise, even though you are in good physical condition.  Seek immediate care if:   You have severe shortness of breath.    Your lips or nails are blue or gray.    The skin around your neck and ribs pulls in when you breathe.    Contact your healthcare provider if:   You are use short-acting medicine every day, or more frequently than  usual.    Your symptoms become worse.    You have questions or concerns about your condition or care.    Medicines:   Medicines  may be given to help decrease inflammation, open airways, and make it easier to breathe. You will use an inhaler to take these medicines. Your healthcare provider will tell you the kind of inhaler to use and show you how to use it. Short-acting medicine is taken 15 minutes before you exercise, or when you have symptoms. Long-acting medicine is taken daily to help prevent an exercise-induced attack. You may also need medicine to control allergies that trigger your symptoms.     Take your medicine as directed.  Contact your healthcare provider if you think your medicine is not helping or if you have side effects. Tell your provider if you are allergic to any medicine. Keep a list of the medicines, vitamins, and herbs you take. Include the amounts, and when and why you take them. Bring the list or the pill bottles to follow-up visits. Carry your medicine list with you in case of an emergency.    Prevent EIB attacks:   Avoid known triggers , such as dust or pollen.     Choose exercise that requires only short bursts of intense breathing, if possible.  Examples include volleyball, gymnastics, baseball, wrestling, or sprinting. Exercise that requires intense breathing for long periods is more likely to trigger EIB.    Warm up before you exercise. Do moderate exercise, such as brisk walking, for 10 minutes before you do strenuous exercise.     Wear a mask over your mouth when you exercise in cold weather.  This will help warm the air you breathe.    Follow up with your doctor as directed:  Write down your questions so you remember to ask them during your visits.  © Copyright [employer] 2023 Information is for End User's use only and may not be sold, redistributed or otherwise used for commercial purposes.  The above information is an  only. It is not intended as medical advice for  individual conditions or treatments. Talk to your doctor, nurse or pharmacist before following any medical regimen to see if it is safe and effective for you.

## 2024-07-16 ENCOUNTER — APPOINTMENT (OUTPATIENT)
Dept: RADIOLOGY | Facility: CLINIC | Age: 24
End: 2024-07-16
Payer: COMMERCIAL

## 2024-07-16 ENCOUNTER — OFFICE VISIT (OUTPATIENT)
Dept: URGENT CARE | Facility: CLINIC | Age: 24
End: 2024-07-16
Payer: COMMERCIAL

## 2024-07-16 VITALS
HEART RATE: 93 BPM | RESPIRATION RATE: 18 BRPM | SYSTOLIC BLOOD PRESSURE: 143 MMHG | WEIGHT: 146 LBS | OXYGEN SATURATION: 98 % | DIASTOLIC BLOOD PRESSURE: 77 MMHG | HEIGHT: 66 IN | BODY MASS INDEX: 23.46 KG/M2 | TEMPERATURE: 98.2 F

## 2024-07-16 DIAGNOSIS — R07.89 CHEST TIGHTNESS: ICD-10-CM

## 2024-07-16 DIAGNOSIS — R05.1 ACUTE COUGH: ICD-10-CM

## 2024-07-16 DIAGNOSIS — J20.8 VIRAL BRONCHITIS: Primary | ICD-10-CM

## 2024-07-16 PROCEDURE — S9083 URGENT CARE CENTER GLOBAL: HCPCS | Performed by: STUDENT IN AN ORGANIZED HEALTH CARE EDUCATION/TRAINING PROGRAM

## 2024-07-16 PROCEDURE — 71046 X-RAY EXAM CHEST 2 VIEWS: CPT

## 2024-07-16 PROCEDURE — 93005 ELECTROCARDIOGRAM TRACING: CPT | Performed by: STUDENT IN AN ORGANIZED HEALTH CARE EDUCATION/TRAINING PROGRAM

## 2024-07-16 PROCEDURE — G0382 LEV 3 HOSP TYPE B ED VISIT: HCPCS | Performed by: STUDENT IN AN ORGANIZED HEALTH CARE EDUCATION/TRAINING PROGRAM

## 2024-07-16 RX ORDER — BENZONATATE 100 MG/1
100 CAPSULE ORAL 3 TIMES DAILY PRN
Qty: 20 CAPSULE | Refills: 0 | Status: SHIPPED | OUTPATIENT
Start: 2024-07-16

## 2024-07-16 NOTE — PROGRESS NOTES
"  [facility]        NAME: Sheila Oakes is a 23 y.o. female  : 2000    MRN: [id number]  DATE: 2024  TIME: 1:43 PM    Assessment and Orders   Viral bronchitis [J20.8]  1. Viral bronchitis        2. Chest tightness  XR chest pa & lateral      3. Acute cough  benzonatate (TESSALON PERLES) 100 mg capsule            Plan and Discussion      EKG shows sinus rhythm with short WV.  Possible left atrial enlargement.  Nonspecific ST abnormality (read from machine). EKG does not reveal acute abnormalities on my read. Chest xray is clear. Chest pain is directly in the center of the chest and is reproducible with palpation, indicating MSK/costochondritis as likely cause of her chest pain, possibly from coughing. Cough is dry, so can treat with tessalon Perles. STRICT ED PRECAUTIONS GIVEN.       Risks and benefits discussed. Patient understands and agrees with the plan.     PATIENT INSTRUCTIONS    If tests have been performed at [facility], our office will contact you with results if changes need to be made to the care plan discussed with you at the visit.  You can review your full results on [facility] MyChart.    Follow up with PCP.     If any of the following occur, please report to your nearest ED for evaluation or call 911.   Difficultly breathing or shortness of breath  Chest pain  Acutely worsening symptoms.         Chief Complaint     Chief Complaint   Patient presents with    Cold Like Symptoms     Chest pain, chest tightness, cough, body aches, fatigue, shaky since waking up this morning.         History of Present Illness       Dry cough started a few days ago. This morning woke up with chest tightness in the middle of her chest. \"Feels like someone is constantly hitting me in the center of the chest.\" Pain with deep breaths.   No cardiac history.     Chest Pain   This is a new problem. The current episode started today. The quality of the pain is described as tightness. The pain does not " radiate. Pertinent negatives include no abdominal pain, back pain, palpitations, shortness of breath (pain with deep breathing), sputum production or vomiting.       Review of Systems   Review of Systems   Respiratory:  Negative for sputum production and shortness of breath (pain with deep breathing).    Cardiovascular:  Positive for chest pain. Negative for palpitations.   Gastrointestinal:  Negative for abdominal pain and vomiting.   Musculoskeletal:  Negative for back pain.         Current Medications       Current Outpatient Medications:     albuterol (ProAir HFA) 90 mcg/act inhaler, Inhale 2 puffs every 4 (four) hours as needed for wheezing or shortness of breath, Disp: 8.5 g, Rfl: 1    benzonatate (TESSALON PERLES) 100 mg capsule, Take 1 capsule (100 mg total) by mouth 3 (three) times a day as needed for cough, Disp: 20 capsule, Rfl: 0    fexofenadine (ALLEGRA) 180 MG tablet, Take 180 mg by mouth as needed, Disp: , Rfl:     norgestimate-ethinyl estradiol (TriNessa, 28,) 0.18/0.215/0.25 MG-35 MCG per tablet, Take 1 tablet by mouth daily, Disp: 84 tablet, Rfl: 3    acetaminophen-codeine (TYLENOL with CODEINE #3) 300-30 MG per tablet, Take 1 tablet by mouth every 4 (four) hours as needed for moderate pain or severe pain (Patient not taking: Reported on 5/13/2024), Disp: 30 tablet, Rfl: 0    amphetamine-dextroamphetamine (ADDERALL XR, 15MG,) 15 MG 24 hr capsule, Take 1 capsule (15 mg total) by mouth every morning Max Daily Amount: 15 mg (Patient not taking: Reported on 7/16/2024), Disp: 30 capsule, Rfl: 0    buPROPion (WELLBUTRIN XL) 150 mg 24 hr tablet, Take 1 tablet (150 mg total) by mouth every morning (Patient not taking: Reported on 5/13/2024), Disp: 90 tablet, Rfl: 1    Current Allergies     Allergies as of 07/16/2024 - Reviewed 07/16/2024   Allergen Reaction Noted    Mupirocin  03/18/2019    Iv contrast [iodinated contrast media] Hives 03/19/2024    Sulfa antibiotics Rash 12/19/2018            The following  "portions of the patient's history were reviewed and updated as appropriate: allergies, current medications, past family history, past medical history, past social history, past surgical history and problem list.     Past Medical History:   Diagnosis Date    Anxiety     Bilateral ovarian cysts     Kidney stones        Past Surgical History:   Procedure Laterality Date    ADENOIDECTOMY      TONSILECTOMY AND ADNOIDECTOMY      TONSILLECTOMY      TYMPANOSTOMY      TYMPANOSTOMY TUBE PLACEMENT      6 sets - last time as a child    WISDOM TOOTH EXTRACTION         Family History   Problem Relation Age of Onset    Fibroids Mother     GI problems Father     Seizures Sister     Arthritis Maternal Grandmother          Medications have been verified.        Objective   /77 (BP Location: Left arm, Patient Position: Sitting, Cuff Size: Standard)   Pulse 93   Temp 98.2 °F (36.8 °C) (Temporal)   Resp 18   Ht 5' 6\" (1.676 m)   Wt 66.2 kg (146 lb)   SpO2 98%   BMI 23.57 kg/m²   No LMP recorded. (Menstrual status: Birth Control).       Physical Exam     Physical Exam  Constitutional:       General: She is not in acute distress.     Appearance: She is not ill-appearing.   HENT:      Nose: No congestion.   Cardiovascular:      Rate and Rhythm: Normal rate and regular rhythm.   Pulmonary:      Effort: Pulmonary effort is normal. No respiratory distress.      Breath sounds: No wheezing or rhonchi.   Chest:      Chest wall: Tenderness present.          Comments: Area tender to palpation.   Neurological:      General: No focal deficit present.      Mental Status: She is alert and oriented to person, place, and time.   Psychiatric:         Mood and Affect: Mood normal.         Behavior: Behavior normal.               Tyra Cuenca DO       "

## 2024-07-16 NOTE — LETTER
July 16, 2024     Patient: Sheila Oakes   YOB: 2000   Date of Visit: 7/16/2024       To Whom It May Concern:    It is my medical opinion that Sheila Oakes may return to work on 7/17/2024 .    If you have any questions or concerns, please don't hesitate to call.         Sincerely,        Tyra Cuenca,     CC: No Recipients

## 2024-07-17 LAB
ATRIAL RATE: 95 BPM
P AXIS: 51 DEGREES
PR INTERVAL: 108 MS
QRS AXIS: 61 DEGREES
QRSD INTERVAL: 80 MS
QT INTERVAL: 374 MS
QTC INTERVAL: 469 MS
T WAVE AXIS: 44 DEGREES
VENTRICULAR RATE: 95 BPM

## 2024-07-17 PROCEDURE — 93010 ELECTROCARDIOGRAM REPORT: CPT | Performed by: INTERNAL MEDICINE

## 2024-07-22 ENCOUNTER — OFFICE VISIT (OUTPATIENT)
Dept: URGENT CARE | Facility: CLINIC | Age: 24
End: 2024-07-22
Payer: COMMERCIAL

## 2024-07-22 VITALS
DIASTOLIC BLOOD PRESSURE: 90 MMHG | RESPIRATION RATE: 18 BRPM | SYSTOLIC BLOOD PRESSURE: 137 MMHG | HEART RATE: 96 BPM | OXYGEN SATURATION: 98 % | TEMPERATURE: 98.4 F

## 2024-07-22 DIAGNOSIS — U07.1 COVID-19: Primary | ICD-10-CM

## 2024-07-22 DIAGNOSIS — J02.9 SORE THROAT: ICD-10-CM

## 2024-07-22 LAB
S PYO AG THROAT QL: NEGATIVE
SARS-COV-2 AG UPPER RESP QL IA: POSITIVE
VALID CONTROL: ABNORMAL

## 2024-07-22 PROCEDURE — S9083 URGENT CARE CENTER GLOBAL: HCPCS

## 2024-07-22 PROCEDURE — 87880 STREP A ASSAY W/OPTIC: CPT

## 2024-07-22 PROCEDURE — G0382 LEV 3 HOSP TYPE B ED VISIT: HCPCS

## 2024-07-22 PROCEDURE — 87811 SARS-COV-2 COVID19 W/OPTIC: CPT

## 2024-07-22 NOTE — LETTER
July 22, 2024     Patient: Sheila Oakes  YOB: 2000  Date of Visit: 7/22/2024      To Whom it May Concern:    Sheila Oakes is under my professional care. Sheila was seen in my office on 7/22/2024. Sheila may return to work on when fever free x24 hours without the use of fever reducing medication .    If you have any questions or concerns, please don't hesitate to call.         Sincerely,          PABLITO Marrero        CC: No Recipients

## 2024-07-22 NOTE — PROGRESS NOTES
[facility]        NAME: Sheila Oakes is a 23 y.o. female  : 2000    MRN: [id number]  DATE: 2024  TIME: 2:14 PM    Assessment and Plan   COVID-19 [U07.1]  1. COVID-19  Poct Covid 19 Rapid Antigen Test      2. Sore throat  POCT rapid ANTIGEN strepA        Rapid strep: NEG  Rapid COVID: POSITIVE      Patient Instructions     You may take over the counter Tylenol (Acetaminophen) and/or Motrin (Ibuprofen) as needed, as directed on packaging.     You may return to work once fever free x24 hours without the use of fever reducing agents (tylenol and motrin)    Vitamin D3 2000 IU daily  Vitamin C 1000mg twice per day  Multivitamin daily  Fluids and rest  Over the counter cold medication as needed    Follow up with PCP in 3-5 days.  Proceed to  ER if symptoms worsen.    If tests are performed, our office will contact you with results only if changes need to made to the care plan discussed with you at the visit. [facility]    Chief Complaint     Chief Complaint   Patient presents with    Cold Like Symptoms     Fever, scratchy throat, started today, was 100.6         History of Present Illness       23-year-old female patient presenting with complaint of fever, and scratchy throat that started today.  She reports a low-grade temp of 100.6 at work. Pt was sent home from work due to the fever and sore throat. Presented here with concern for possible COVID.       Review of Systems   Review of Systems   Constitutional:  Positive for chills, fatigue and fever. Negative for activity change and appetite change.   HENT:  Positive for ear pain (bilateral) and sore throat.    Respiratory:  Negative for cough, chest tightness and shortness of breath.    Cardiovascular:  Negative for chest pain.   Gastrointestinal: Negative.    Musculoskeletal: Negative.  Negative for neck pain and neck stiffness.   Skin: Negative.    Neurological: Negative.          Current  Medications       Current Outpatient Medications:     albuterol (ProAir HFA) 90 mcg/act inhaler, Inhale 2 puffs every 4 (four) hours as needed for wheezing or shortness of breath, Disp: 8.5 g, Rfl: 1    fexofenadine (ALLEGRA) 180 MG tablet, Take 180 mg by mouth as needed, Disp: , Rfl:     norgestimate-ethinyl estradiol (TriNessa, 28,) 0.18/0.215/0.25 MG-35 MCG per tablet, Take 1 tablet by mouth daily, Disp: 84 tablet, Rfl: 3    acetaminophen-codeine (TYLENOL with CODEINE #3) 300-30 MG per tablet, Take 1 tablet by mouth every 4 (four) hours as needed for moderate pain or severe pain (Patient not taking: Reported on 5/13/2024), Disp: 30 tablet, Rfl: 0    amphetamine-dextroamphetamine (ADDERALL XR, 15MG,) 15 MG 24 hr capsule, Take 1 capsule (15 mg total) by mouth every morning Max Daily Amount: 15 mg (Patient not taking: Reported on 7/16/2024), Disp: 30 capsule, Rfl: 0    benzonatate (TESSALON PERLES) 100 mg capsule, Take 1 capsule (100 mg total) by mouth 3 (three) times a day as needed for cough, Disp: 20 capsule, Rfl: 0    buPROPion (WELLBUTRIN XL) 150 mg 24 hr tablet, Take 1 tablet (150 mg total) by mouth every morning (Patient not taking: Reported on 5/13/2024), Disp: 90 tablet, Rfl: 1    Current Allergies     Allergies as of 07/22/2024 - Reviewed 07/22/2024   Allergen Reaction Noted    Mupirocin  03/18/2019    Iv contrast [iodinated contrast media] Hives 03/19/2024    Sulfa antibiotics Rash 12/19/2018            The following portions of the patient's history were reviewed and updated as appropriate: allergies, current medications, past family history, past medical history, past social history, past surgical history and problem list.     Past Medical History:   Diagnosis Date    Anxiety     Bilateral ovarian cysts     Kidney stones        Past Surgical History:   Procedure Laterality Date    ADENOIDECTOMY      TONSILECTOMY AND ADNOIDECTOMY      TONSILLECTOMY      TYMPANOSTOMY      TYMPANOSTOMY TUBE PLACEMENT       6 sets - last time as a child    WISDOM TOOTH EXTRACTION         Family History   Problem Relation Age of Onset    Fibroids Mother     GI problems Father     Seizures Sister     Arthritis Maternal Grandmother          Medications have been verified.        Objective   /90   Pulse 96   Temp 98.4 °F (36.9 °C)   Resp 18   SpO2 98%        Physical Exam     Physical Exam  Vitals and nursing note reviewed.   Constitutional:       Appearance: Normal appearance.   HENT:      Head: Normocephalic and atraumatic.      Right Ear: Tympanic membrane is injected. Tympanic membrane is not erythematous or bulging.      Left Ear: Tympanic membrane is injected. Tympanic membrane is not erythematous or bulging.      Mouth/Throat:      Lips: Pink. No lesions.      Mouth: Mucous membranes are moist.      Tongue: No lesions. Tongue does not deviate from midline.      Palate: No mass and lesions.      Pharynx: Oropharynx is clear. Uvula midline. Posterior oropharyngeal erythema present. No pharyngeal swelling, oropharyngeal exudate, uvula swelling or postnasal drip.      Comments: Tonsils surgically absent  Eyes:      Extraocular Movements: Extraocular movements intact.      Conjunctiva/sclera: Conjunctivae normal.      Pupils: Pupils are equal, round, and reactive to light.   Cardiovascular:      Rate and Rhythm: Normal rate and regular rhythm.      Pulses: Normal pulses.      Heart sounds: Normal heart sounds.   Pulmonary:      Effort: Pulmonary effort is normal.      Breath sounds: Normal breath sounds.   Abdominal:      General: Abdomen is flat. Bowel sounds are normal.      Palpations: Abdomen is soft.   Musculoskeletal:         General: Normal range of motion.      Cervical back: Normal range of motion and neck supple.   Lymphadenopathy:      Cervical: No cervical adenopathy.   Skin:     General: Skin is warm and dry.      Capillary Refill: Capillary refill takes less than 2 seconds.   Neurological:      General: No focal  deficit present.      Mental Status: She is alert and oriented to person, place, and time.

## 2024-09-03 DIAGNOSIS — F90.9 ADULT ADHD: ICD-10-CM

## 2024-09-03 RX ORDER — DEXTROAMPHETAMINE SACCHARATE, AMPHETAMINE ASPARTATE MONOHYDRATE, DEXTROAMPHETAMINE SULFATE AND AMPHETAMINE SULFATE 3.75; 3.75; 3.75; 3.75 MG/1; MG/1; MG/1; MG/1
15 CAPSULE, EXTENDED RELEASE ORAL EVERY MORNING
Qty: 30 CAPSULE | Refills: 0 | Status: SHIPPED | OUTPATIENT
Start: 2024-09-03

## 2024-09-11 ENCOUNTER — OFFICE VISIT (OUTPATIENT)
Dept: FAMILY MEDICINE CLINIC | Facility: CLINIC | Age: 24
End: 2024-09-11
Payer: COMMERCIAL

## 2024-09-11 VITALS
HEIGHT: 66 IN | RESPIRATION RATE: 18 BRPM | SYSTOLIC BLOOD PRESSURE: 128 MMHG | OXYGEN SATURATION: 99 % | HEART RATE: 82 BPM | TEMPERATURE: 98 F | DIASTOLIC BLOOD PRESSURE: 72 MMHG | BODY MASS INDEX: 23.63 KG/M2 | WEIGHT: 147 LBS

## 2024-09-11 DIAGNOSIS — H69.93 DYSFUNCTION OF BOTH EUSTACHIAN TUBES: ICD-10-CM

## 2024-09-11 DIAGNOSIS — H60.503 ACUTE OTITIS EXTERNA OF BOTH EARS, UNSPECIFIED TYPE: Primary | ICD-10-CM

## 2024-09-11 DIAGNOSIS — H92.03 EAR PAIN, BILATERAL: ICD-10-CM

## 2024-09-11 PROCEDURE — 99213 OFFICE O/P EST LOW 20 MIN: CPT | Performed by: NURSE PRACTITIONER

## 2024-09-11 RX ORDER — NEOMYCIN SULFATE, POLYMYXIN B SULFATE, HYDROCORTISONE 3.5; 10000; 1 MG/ML; [USP'U]/ML; MG/ML
4 SOLUTION/ DROPS AURICULAR (OTIC) 4 TIMES DAILY
Qty: 10 ML | Refills: 0 | Status: SHIPPED | OUTPATIENT
Start: 2024-09-11 | End: 2024-09-19

## 2024-09-11 RX ORDER — PREDNISONE 20 MG/1
20 TABLET ORAL 2 TIMES DAILY WITH MEALS
Qty: 10 TABLET | Refills: 0 | Status: SHIPPED | OUTPATIENT
Start: 2024-09-11 | End: 2024-09-16

## 2024-09-11 NOTE — PROGRESS NOTES
Ambulatory Visit  Name: Sheila Oakes      : 2000      MRN: [id number]  Encounter Provider: PABLITO Mars  Encounter Date: 2024   Encounter department: [facility]    Assessment & Plan  Acute otitis externa of both ears, unspecified type    Orders:    neomycin-polymyxin-hydrocortisone (CORTISPORIN) otic solution; Administer 4 drops into both ears 4 (four) times a day    predniSONE 20 mg tablet; Take 1 tablet (20 mg total) by mouth 2 (two) times a day with meals for 5 days    Dysfunction of both eustachian tubes    Orders:    predniSONE 20 mg tablet; Take 1 tablet (20 mg total) by mouth 2 (two) times a day with meals for 5 days    Ear pain, bilateral    Orders:    predniSONE 20 mg tablet; Take 1 tablet (20 mg total) by mouth 2 (two) times a day with meals for 5 days         History of Present Illness      Patient here for sick visit with c/I b/l ear pain last night. Left ear is worse.  Denies congestion.  History of tubes in her ears.       Review of Systems   Constitutional: Negative.  Negative for activity change, appetite change, chills, fatigue and fever.   HENT:  Negative for congestion, ear pain, nosebleeds, rhinorrhea and sore throat.    Eyes:  Negative for photophobia, pain, redness and visual disturbance.   Respiratory: Negative.  Negative for cough, shortness of breath and wheezing.    Cardiovascular: Negative.  Negative for chest pain.   Gastrointestinal: Negative.  Negative for abdominal pain, constipation, diarrhea and vomiting.   Endocrine: Negative.    Genitourinary:  Negative for difficulty urinating, dysuria and flank pain.   Musculoskeletal: Negative.    Skin:  Negative for color change and rash.   Neurological: Negative.  Negative for dizziness, weakness, numbness and headaches.   Hematological:  Negative for adenopathy.   Psychiatric/Behavioral: Negative.  Negative for agitation and confusion. The patient is not nervous/anxious.      Past Medical  History:   Diagnosis Date    Anxiety     Bilateral ovarian cysts     Kidney stones      Past Surgical History:   Procedure Laterality Date    ADENOIDECTOMY      TONSILECTOMY AND ADNOIDECTOMY      TONSILLECTOMY      TYMPANOSTOMY      TYMPANOSTOMY TUBE PLACEMENT      6 sets - last time as a child    WISDOM TOOTH EXTRACTION       Family History   Problem Relation Age of Onset    Fibroids Mother     GI problems Father     Seizures Sister     Arthritis Maternal Grandmother      Social History     Tobacco Use    Smoking status: Never    Smokeless tobacco: Never   Vaping Use    Vaping status: Never Used   Substance and Sexual Activity    Alcohol use: Yes     Comment: socially     Drug use: No    Sexual activity: Yes     Partners: Male     Birth control/protection: OCP     Current Outpatient Medications on File Prior to Visit   Medication Sig    albuterol (ProAir HFA) 90 mcg/act inhaler Inhale 2 puffs every 4 (four) hours as needed for wheezing or shortness of breath    amphetamine-dextroamphetamine (ADDERALL XR, 15MG,) 15 MG 24 hr capsule Take 1 capsule (15 mg total) by mouth every morning Max Daily Amount: 15 mg    fexofenadine (ALLEGRA) 180 MG tablet Take 180 mg by mouth as needed    norgestimate-ethinyl estradiol (TriNessa, 28,) 0.18/0.215/0.25 MG-35 MCG per tablet Take 1 tablet by mouth daily    benzonatate (TESSALON PERLES) 100 mg capsule Take 1 capsule (100 mg total) by mouth 3 (three) times a day as needed for cough (Patient not taking: Reported on 9/11/2024)     Allergies   Allergen Reactions    Mupirocin      tongue swollen    Iv Contrast [Iodinated Contrast Media] Hives     Patient developed a few hives on the face/neck after IV contrast. No other associated symptoms.     Sulfa Antibiotics Rash     Immunization History   Administered Date(s) Administered    COVID-19 MODERNA VACC 0.5 ML IM 04/06/2021, 04/06/2021, 05/04/2021, 05/04/2021    DTaP 01/14/2000, 03/22/2000, 05/10/2000, 02/27/2001, 11/15/2004    Hep A,  "ped/adol, 2 dose 11/16/2016, 11/24/2017    Hep B, adult 05/10/2000, 08/15/2000, 05/09/2001    Hepatitis A 11/16/2016, 11/24/2017    HiB 01/14/2000, 03/02/2000, 05/10/2000, 02/27/2001    INFLUENZA 11/16/2009, 11/16/2016, 11/26/2019, 11/13/2020    IPV 01/04/2000, 03/22/2000, 05/09/2001, 11/15/2004    Influenza Quadrivalent 3 years and older 11/01/2020    MMR 11/15/2000, 01/13/2003    Meningococcal B, OMV (BEXSERO) 12/26/2017, 04/23/2018    Meningococcal MCV4, Unspecified 08/10/2011, 11/10/2015    Meningococcal MCV4P 08/10/2011, 11/10/2015    Pneumococcal Conjugate PCV 7 09/22/2000, 11/15/2000    Tdap 08/16/2011    Tuberculin Skin Test-PPD Intradermal 08/07/2017, 08/20/2021    Varicella 11/15/2000, 05/14/2009     Objective     /72   Pulse 82   Temp 98 °F (36.7 °C) (Tympanic)   Resp 18   Ht 5' 6\" (1.676 m)   Wt 66.7 kg (147 lb)   SpO2 99%   BMI 23.73 kg/m²     Physical Exam  Vitals and nursing note reviewed.   Constitutional:       General: She is not in acute distress.     Appearance: She is well-developed. She is not ill-appearing or diaphoretic.   HENT:      Head: Normocephalic and atraumatic.      Right Ear: Hearing, tympanic membrane and ear canal normal.      Left Ear: Hearing, tympanic membrane and ear canal normal.      Nose: Nose normal.      Mouth/Throat:      Mouth: Oropharynx is clear and moist. Mucous membranes are moist.      Pharynx: Uvula midline. No oropharyngeal exudate.   Eyes:      General: Lids are normal.      Extraocular Movements: EOM normal.      Conjunctiva/sclera: Conjunctivae normal.   Cardiovascular:      Rate and Rhythm: Normal rate and regular rhythm.      Heart sounds: Normal heart sounds, S1 normal and S2 normal. No murmur heard.     No gallop.   Pulmonary:      Effort: Pulmonary effort is normal. No respiratory distress.      Breath sounds: Normal breath sounds.   Musculoskeletal:         General: No tenderness or edema. Normal range of motion.   Lymphadenopathy:      " Cervical: No cervical adenopathy.   Skin:     General: Skin is warm.      Capillary Refill: Capillary refill takes less than 2 seconds.      Findings: No rash.   Neurological:      General: No focal deficit present.      Mental Status: She is alert and oriented to person, place, and time.   Psychiatric:         Mood and Affect: Mood and affect normal.         Behavior: Behavior normal. Behavior is cooperative.         Thought Content: Thought content normal.         Judgment: Judgment normal.

## 2024-09-17 ENCOUNTER — APPOINTMENT (OUTPATIENT)
Dept: LAB | Facility: CLINIC | Age: 24
End: 2024-09-17
Payer: COMMERCIAL

## 2024-09-17 DIAGNOSIS — R19.7 DIARRHEA, UNSPECIFIED TYPE: ICD-10-CM

## 2024-09-17 DIAGNOSIS — R19.7 DIARRHEA, UNSPECIFIED TYPE: Primary | ICD-10-CM

## 2024-09-17 LAB
BASOPHILS # BLD AUTO: 0.06 THOUSANDS/ΜL (ref 0–0.1)
BASOPHILS NFR BLD AUTO: 1 % (ref 0–1)
CRP SERPL QL: 16.8 MG/L
EOSINOPHIL # BLD AUTO: 0.1 THOUSAND/ΜL (ref 0–0.61)
EOSINOPHIL NFR BLD AUTO: 1 % (ref 0–6)
ERYTHROCYTE [DISTWIDTH] IN BLOOD BY AUTOMATED COUNT: 12.8 % (ref 11.6–15.1)
HCT VFR BLD AUTO: 40.4 % (ref 34.8–46.1)
HGB BLD-MCNC: 13.5 G/DL (ref 11.5–15.4)
IGA SERPL-MCNC: 232 MG/DL (ref 66–433)
IMM GRANULOCYTES # BLD AUTO: 0.02 THOUSAND/UL (ref 0–0.2)
IMM GRANULOCYTES NFR BLD AUTO: 0 % (ref 0–2)
LYMPHOCYTES # BLD AUTO: 3.84 THOUSANDS/ΜL (ref 0.6–4.47)
LYMPHOCYTES NFR BLD AUTO: 41 % (ref 14–44)
MCH RBC QN AUTO: 29.5 PG (ref 26.8–34.3)
MCHC RBC AUTO-ENTMCNC: 33.4 G/DL (ref 31.4–37.4)
MCV RBC AUTO: 88 FL (ref 82–98)
MONOCYTES # BLD AUTO: 0.71 THOUSAND/ΜL (ref 0.17–1.22)
MONOCYTES NFR BLD AUTO: 8 % (ref 4–12)
NEUTROPHILS # BLD AUTO: 4.72 THOUSANDS/ΜL (ref 1.85–7.62)
NEUTS SEG NFR BLD AUTO: 49 % (ref 43–75)
NRBC BLD AUTO-RTO: 0 /100 WBCS
PLATELET # BLD AUTO: 252 THOUSANDS/UL (ref 149–390)
PMV BLD AUTO: 12.3 FL (ref 8.9–12.7)
RBC # BLD AUTO: 4.58 MILLION/UL (ref 3.81–5.12)
TSH SERPL DL<=0.05 MIU/L-ACNC: 1.24 UIU/ML (ref 0.45–4.5)
WBC # BLD AUTO: 9.45 THOUSAND/UL (ref 4.31–10.16)

## 2024-09-17 PROCEDURE — 87177 OVA AND PARASITES SMEARS: CPT

## 2024-09-17 PROCEDURE — 36415 COLL VENOUS BLD VENIPUNCTURE: CPT

## 2024-09-17 PROCEDURE — 82784 ASSAY IGA/IGD/IGG/IGM EACH: CPT

## 2024-09-17 PROCEDURE — 87506 IADNA-DNA/RNA PROBE TQ 6-11: CPT

## 2024-09-17 PROCEDURE — 85025 COMPLETE CBC W/AUTO DIFF WBC: CPT

## 2024-09-17 PROCEDURE — 84443 ASSAY THYROID STIM HORMONE: CPT

## 2024-09-17 PROCEDURE — 86364 TISS TRNSGLTMNASE EA IG CLAS: CPT

## 2024-09-17 PROCEDURE — 83993 ASSAY FOR CALPROTECTIN FECAL: CPT

## 2024-09-17 PROCEDURE — 87209 SMEAR COMPLEX STAIN: CPT

## 2024-09-17 PROCEDURE — 86140 C-REACTIVE PROTEIN: CPT

## 2024-09-17 RX ORDER — DICYCLOMINE HYDROCHLORIDE 10 MG/1
10 CAPSULE ORAL 3 TIMES DAILY PRN
Qty: 30 CAPSULE | Refills: 0 | Status: SHIPPED | OUTPATIENT
Start: 2024-09-17 | End: 2024-09-19

## 2024-09-18 LAB
C COLI+JEJUNI TUF STL QL NAA+PROBE: NEGATIVE
C DIFF TOX GENS STL QL NAA+PROBE: NEGATIVE
CALPROTECTIN STL-MCNC: 10.7 ΜG/G
EC STX1+STX2 GENES STL QL NAA+PROBE: NEGATIVE
G LAMBLIA AG STL QL IA: NEGATIVE
SALMONELLA SP SPAO STL QL NAA+PROBE: NEGATIVE
SHIGELLA SP+EIEC IPAH STL QL NAA+PROBE: NEGATIVE
TTG IGA SER-ACNC: <2 U/ML (ref 0–3)

## 2024-09-19 ENCOUNTER — OFFICE VISIT (OUTPATIENT)
Dept: GASTROENTEROLOGY | Facility: CLINIC | Age: 24
End: 2024-09-19
Payer: COMMERCIAL

## 2024-09-19 ENCOUNTER — OFFICE VISIT (OUTPATIENT)
Dept: FAMILY MEDICINE CLINIC | Facility: CLINIC | Age: 24
End: 2024-09-19
Payer: COMMERCIAL

## 2024-09-19 VITALS
SYSTOLIC BLOOD PRESSURE: 122 MMHG | BODY MASS INDEX: 23.56 KG/M2 | WEIGHT: 146.6 LBS | TEMPERATURE: 98.4 F | DIASTOLIC BLOOD PRESSURE: 68 MMHG | RESPIRATION RATE: 18 BRPM | HEIGHT: 66 IN | HEART RATE: 102 BPM | OXYGEN SATURATION: 98 %

## 2024-09-19 VITALS
DIASTOLIC BLOOD PRESSURE: 60 MMHG | WEIGHT: 161.1 LBS | BODY MASS INDEX: 25.89 KG/M2 | RESPIRATION RATE: 18 BRPM | TEMPERATURE: 98.4 F | HEIGHT: 66 IN | SYSTOLIC BLOOD PRESSURE: 102 MMHG | OXYGEN SATURATION: 99 % | HEART RATE: 114 BPM

## 2024-09-19 DIAGNOSIS — R10.9 ABDOMINAL PAIN, UNSPECIFIED ABDOMINAL LOCATION: ICD-10-CM

## 2024-09-19 DIAGNOSIS — K62.5 BRBPR (BRIGHT RED BLOOD PER RECTUM): ICD-10-CM

## 2024-09-19 DIAGNOSIS — U09.9 COVID-19 LONG HAULER: ICD-10-CM

## 2024-09-19 DIAGNOSIS — R19.4 CHANGE IN BOWEL HABITS: Primary | ICD-10-CM

## 2024-09-19 DIAGNOSIS — F41.1 GAD (GENERALIZED ANXIETY DISORDER): Primary | ICD-10-CM

## 2024-09-19 DIAGNOSIS — K58.0 IRRITABLE BOWEL SYNDROME WITH DIARRHEA: ICD-10-CM

## 2024-09-19 PROCEDURE — 99214 OFFICE O/P EST MOD 30 MIN: CPT | Performed by: NURSE PRACTITIONER

## 2024-09-19 PROCEDURE — 99213 OFFICE O/P EST LOW 20 MIN: CPT | Performed by: PHYSICIAN ASSISTANT

## 2024-09-19 RX ORDER — CHOLECALCIFEROL (VITAMIN D3) 125 MCG
3000 CAPSULE ORAL
Qty: 90 TABLET | Refills: 0 | Status: SHIPPED | OUTPATIENT
Start: 2024-09-19

## 2024-09-19 RX ORDER — ONDANSETRON 4 MG/1
4 TABLET, FILM COATED ORAL EVERY 8 HOURS PRN
Qty: 20 TABLET | Refills: 0 | Status: SHIPPED | OUTPATIENT
Start: 2024-09-19

## 2024-09-19 RX ORDER — DICYCLOMINE HCL 20 MG
20 TABLET ORAL EVERY 6 HOURS PRN
Qty: 30 TABLET | Refills: 1 | Status: SHIPPED | OUTPATIENT
Start: 2024-09-19

## 2024-09-19 RX ORDER — CHOLECALCIFEROL (VITAMIN D3) 125 MCG
3000 CAPSULE ORAL
Qty: 90 TABLET | Refills: 0 | OUTPATIENT
Start: 2024-09-19

## 2024-09-19 RX ORDER — CHOLECALCIFEROL (VITAMIN D3) 125 MCG
3000 CAPSULE ORAL
Qty: 90 TABLET | Refills: 0 | Status: SHIPPED | OUTPATIENT
Start: 2024-09-19 | End: 2024-09-19 | Stop reason: SDUPTHER

## 2024-09-19 NOTE — PATIENT INSTRUCTIONS
Your symptoms is not exactly clear at this time.  I am still pretty suspicious that you at least have some degree of irritable bowel syndrome.    The fecal calprotectin which looks for inflammation in the colon and intestine was normal which does support this as well.  I think though potentially the antibiotic usage and some of your reactions with food may flareup your symptoms.    Lets check you for SIBO.    Look into the low FODMAP diet.    Try to avoid dairy as you can.    Start on Benefiber every single day I think this is really going to help regulate your stool output and prevent the constipation and diarrhea fluctuations.    You can use the dicyclomine as needed for the cramping and urgent loose stool.

## 2024-09-19 NOTE — PROGRESS NOTES
Ambulatory Visit  Name: Sheila Oakes      : 2000      MRN: [id number]  Encounter Provider: PABLITO Guidry  Encounter Date: 2024   Encounter department: [facility]    Assessment & Plan  CIARA (generalized anxiety disorder)         Irritable bowel syndrome with diarrhea    Orders:    dicyclomine (BENTYL) 20 mg tablet; Take 1 tablet (20 mg total) by mouth every 6 (six) hours as needed (abdominal cramping and diarrhea)    COVID-19 long hauler              History of Present Illness     Here to discuss symptoms since having covid 5 times- most recently 1 1/2 months ago-   Bronchitis 3 months ago, GI symptoms- diarrhea, left work today because of her GI symptoms  Headaches/vision changes- photosensitivity.   Finding out if she can get zulily paperwork. When she had her evaluation with her manager- there is a pattern that she leaves .       Review of Systems   Constitutional:  Negative for activity change, diaphoresis, fatigue and fever.   HENT:  Negative for congestion, facial swelling, hearing loss, rhinorrhea, sinus pressure, sinus pain, sneezing, sore throat and voice change.    Eyes:  Negative for discharge and visual disturbance.   Respiratory:  Negative for cough, choking, chest tightness, shortness of breath, wheezing and stridor.    Cardiovascular:  Negative for chest pain, palpitations and leg swelling.   Gastrointestinal:  Positive for abdominal pain, diarrhea and nausea. Negative for abdominal distention, constipation and vomiting.   Endocrine: Negative for polydipsia, polyphagia and polyuria.   Genitourinary:  Negative for difficulty urinating, dysuria, frequency and urgency.   Musculoskeletal:  Negative for arthralgias, back pain, gait problem, joint swelling, myalgias, neck pain and neck stiffness.   Skin:  Negative for color change, rash and wound.   Neurological:  Negative for dizziness, syncope, speech difficulty, weakness, light-headedness and  headaches.   Hematological:  Negative for adenopathy. Does not bruise/bleed easily.   Psychiatric/Behavioral:  Negative for agitation, behavioral problems, confusion, hallucinations, sleep disturbance and suicidal ideas. The patient is nervous/anxious.      Past Medical History:   Diagnosis Date    Anxiety     Bilateral ovarian cysts     Kidney stones      Past Surgical History:   Procedure Laterality Date    ADENOIDECTOMY      TONSILECTOMY AND ADNOIDECTOMY      TONSILLECTOMY      TYMPANOSTOMY      TYMPANOSTOMY TUBE PLACEMENT      6 sets - last time as a child    WISDOM TOOTH EXTRACTION       Family History   Problem Relation Age of Onset    Fibroids Mother     GI problems Father     Seizures Sister     Arthritis Maternal Grandmother      Social History     Tobacco Use    Smoking status: Never     Passive exposure: Never    Smokeless tobacco: Never   Vaping Use    Vaping status: Never Used   Substance and Sexual Activity    Alcohol use: Yes     Comment: socially     Drug use: No    Sexual activity: Yes     Partners: Male     Birth control/protection: OCP     Current Outpatient Medications on File Prior to Visit   Medication Sig    albuterol (ProAir HFA) 90 mcg/act inhaler Inhale 2 puffs every 4 (four) hours as needed for wheezing or shortness of breath    amphetamine-dextroamphetamine (ADDERALL XR, 15MG,) 15 MG 24 hr capsule Take 1 capsule (15 mg total) by mouth every morning Max Daily Amount: 15 mg    fexofenadine (ALLEGRA) 180 MG tablet Take 180 mg by mouth as needed    norgestimate-ethinyl estradiol (TriNessa, 28,) 0.18/0.215/0.25 MG-35 MCG per tablet Take 1 tablet by mouth daily    ondansetron (ZOFRAN) 4 mg tablet Take 1 tablet (4 mg total) by mouth every 8 (eight) hours as needed for nausea or vomiting    [DISCONTINUED] dicyclomine (BENTYL) 10 mg capsule Take 1 capsule (10 mg total) by mouth 3 (three) times a day as needed (abd cramping)    [DISCONTINUED] lactase (LACTAID) 3,000 units tablet Take 1 tablet  "(3,000 Units total) by mouth 3 (three) times a day with meals    [DISCONTINUED] benzonatate (TESSALON PERLES) 100 mg capsule Take 1 capsule (100 mg total) by mouth 3 (three) times a day as needed for cough (Patient not taking: Reported on 9/11/2024)    [DISCONTINUED] neomycin-polymyxin-hydrocortisone (CORTISPORIN) otic solution Administer 4 drops into both ears 4 (four) times a day (Patient not taking: Reported on 9/19/2024)     Allergies   Allergen Reactions    Mupirocin      tongue swollen    Iv Contrast [Iodinated Contrast Media] Hives     Patient developed a few hives on the face/neck after IV contrast. No other associated symptoms.     Sulfa Antibiotics Rash     Immunization History   Administered Date(s) Administered    COVID-19 MODERNA VACC 0.5 ML IM 04/06/2021, 04/06/2021, 05/04/2021, 05/04/2021    DTaP 01/14/2000, 03/22/2000, 05/10/2000, 02/27/2001, 11/15/2004    Hep A, ped/adol, 2 dose 11/16/2016, 11/24/2017    Hep B, adult 05/10/2000, 08/15/2000, 05/09/2001    Hepatitis A 11/16/2016, 11/24/2017    HiB 01/14/2000, 03/02/2000, 05/10/2000, 02/27/2001    INFLUENZA 11/16/2009, 11/16/2016, 11/26/2019, 11/13/2020    IPV 01/04/2000, 03/22/2000, 05/09/2001, 11/15/2004    Influenza Quadrivalent 3 years and older 11/01/2020    MMR 11/15/2000, 01/13/2003    Meningococcal B, OMV (BEXSERO) 12/26/2017, 04/23/2018    Meningococcal MCV4, Unspecified 08/10/2011, 11/10/2015    Meningococcal MCV4P 08/10/2011, 11/10/2015    Pneumococcal Conjugate PCV 7 09/22/2000, 11/15/2000    Tdap 08/16/2011    Tuberculin Skin Test-PPD Intradermal 08/07/2017, 08/20/2021    Varicella 11/15/2000, 05/14/2009     Objective     /60   Pulse (!) 114   Temp 98.4 °F (36.9 °C)   Resp 18   Ht 5' 6\" (1.676 m)   Wt 73.1 kg (161 lb 1.6 oz)   SpO2 99%   BMI 26.00 kg/m²     Physical Exam  Vitals and nursing note reviewed.   Constitutional:       General: She is not in acute distress.     Appearance: She is well-developed. She is diaphoretic. "   Neck:      Trachea: No tracheal deviation.   Cardiovascular:      Rate and Rhythm: Regular rhythm. Tachycardia present.      Heart sounds: Normal heart sounds.   Pulmonary:      Effort: Pulmonary effort is normal. No respiratory distress.      Breath sounds: Normal breath sounds. No wheezing.   Abdominal:      Tenderness: There is generalized abdominal tenderness.   Musculoskeletal:         General: No tenderness or deformity. Normal range of motion.   Skin:     General: Skin is warm.      Findings: No erythema or rash.   Neurological:      Mental Status: She is alert and oriented to person, place, and time.   Psychiatric:         Attention and Perception: Attention normal.         Mood and Affect: Mood is anxious.         Speech: Speech normal.         Behavior: Behavior normal. Behavior is cooperative.         Thought Content: Thought content normal.         Cognition and Memory: Cognition and memory normal.         Judgment: Judgment normal.

## 2024-09-19 NOTE — PROGRESS NOTES
[facility] - Outpatient Follow-up Note  Sheila Oakes 23 y.o. female MRN: [id number]  Encounter: [id number]    ASSESSMENT AND PLAN:      1. Change in bowel habits  2. BRBPR (bright red blood per rectum)  3. Abdominal pain, unspecified abdominal location    Patient with chronic waxing and waning GI symptoms to include abdominal discomfort, fecal urgency, and diarrhea.  In the past it was postulated this was secondary to irritable bowel syndrome.  Symptoms have acutely worsened and she underwent repeat stool testing which was negative for infection and demonstrated a normal fecal calprotectin.  However, she has a new symptom of rectal bleeding at this time. Her Crp was elevated, though this is non-specific.   Differential includes IBS with outlet bleeding (, IBD, other chronic infection, SIBO, polyp, ulcer etc.  Suspect she likely has a lactose sensitivity as well. Celiac testing was negative.     Recommend colonoscopy now given new symptom of rectal bleeding. Check SIBO testing.   Recommend initiation of daily fiber supplement as stools sound to alternate.   Recommend looking into low FODMAP diet.   Trial IB Mateo, Gasx, Beano.   Trial anti-spasmodic if needed.   Avoid dairy, if she ingests would take lactaid.     I discussed informed consent with the patient. The risks/benefits/alternatives of the procedure were discussed with the patient. Risks included, but not limited to, infection, bleeding, perforation, injury to organs in the abdomen, missed lesion and incomplete procedure were discussed. Patient was agreeable.    Miralax prep sent to pharmacy for pt.     - Colonoscopy; Future  - Small intestinal bacterial overgrowth  - ondansetron (ZOFRAN) 4 mg tablet; Take 1 tablet (4 mg total) by mouth every 8 (eight) hours as needed for nausea or vomiting  Dispense: 20 tablet; Refill: 0  - lactase (LACTAID) 3,000 units tablet; Take 1 tablet (3,000 Units total) by mouth 3 (three) times a day  with meals  Dispense: 90 tablet; Refill: 0    We will follow up after colonoscopy evaluation.   ______________________________________________________________________    SUBJECTIVE: Patient is a 23 y.o. female who presents today for follow-up regarding abdominal pain and diarrhea. Pmhx sig for CIARA, ADHD, headache syndrome.     Pt was last evaluated in 07/2023. At that time, she was complaining of intermittent abdominal pain and urgent loose stools occurring half the days out of the week.  She had stool studies completed which were negative for infection and fecal calprotectin at that time was normal.  It was recommended she look into the low FODMAP diet though she was lost to follow-up.    09/19/24:     Pt shares that over the past week or so, her GI symptoms markedly worsened. She can't recall one obvious precipitant, though shares in the past year has been on numerous courses of antibiotics for URI/ear infections. Was having 5-6 urgent loose/liquid stools daily, and started to notice rectal bleeding this week which was new. Fresh appearing blood on the wipe and in the bowl. No melenic stool output. At times also looked like mucus discharge. She had repeat stool studies completed given bowel habit change and these were negative. Fecal calprotectin was normal. Her CRP was elevated.     She notes that her stools can alternate between urgent loose/liquid with abdominal pain, and then she may have constipation/straining. Notes stools can be anywhere from Vigo 1-7. No nocturnal BM.    Pt denies heartburn, indigestion, nausea, emesis, dysphagia or odynophagia. No early satiety or abnormal weight loss over past 6 months.     No recurrent mouth ulcers, recurrent rashes, recurrent eye infections, recurrent joint erythema/swelling.     NSAIDs: none  Etoh: none  Tobacco: none     03/2024: CT A/P: Mild right-sided hydroureteronephrosis down to the mid ureter. No findings for obstructive process. No hydronephrosis on the  "left  09/2024: Negative stool enteric, negative Giardia, negative C. difficile, fecal calprotectin 10.7, IgA 232, TTG IgA <2, Hb 13.5, MCV 88, platelets 252, CRP 16.8, TSH 1.243    Endoscopic history:   EGD: none  Colon: none    Review of Systems   Otherwise Per HPI    Historical Information   Past Medical History:   Diagnosis Date    Anxiety     Bilateral ovarian cysts     Kidney stones      Past Surgical History:   Procedure Laterality Date    ADENOIDECTOMY      TONSILECTOMY AND ADNOIDECTOMY      TONSILLECTOMY      TYMPANOSTOMY      TYMPANOSTOMY TUBE PLACEMENT      6 sets - last time as a child    WISDOM TOOTH EXTRACTION       Social History   Social History     Substance and Sexual Activity   Alcohol Use Yes    Comment: socially      Social History     Substance and Sexual Activity   Drug Use No     Social History     Tobacco Use   Smoking Status Never    Passive exposure: Never   Smokeless Tobacco Never     Family History   Problem Relation Age of Onset    Fibroids Mother     GI problems Father     Seizures Sister     Arthritis Maternal Grandmother        Meds/Allergies       Current Outpatient Medications:     albuterol (ProAir HFA) 90 mcg/act inhaler    amphetamine-dextroamphetamine (ADDERALL XR, 15MG,) 15 MG 24 hr capsule    fexofenadine (ALLEGRA) 180 MG tablet    norgestimate-ethinyl estradiol (TriNessa, 28,) 0.18/0.215/0.25 MG-35 MCG per tablet    dicyclomine (BENTYL) 10 mg capsule    neomycin-polymyxin-hydrocortisone (CORTISPORIN) otic solution    Allergies   Allergen Reactions    Mupirocin      tongue swollen    Iv Contrast [Iodinated Contrast Media] Hives     Patient developed a few hives on the face/neck after IV contrast. No other associated symptoms.     Sulfa Antibiotics Rash       Objective     Blood pressure 122/68, pulse 102, temperature 98.4 °F (36.9 °C), temperature source Temporal, resp. rate 18, height 5' 6\" (1.676 m), weight 66.5 kg (146 lb 9.6 oz), SpO2 98%. Body mass index is 23.66 " kg/m².    Physical Exam  Vitals and nursing note reviewed.   Constitutional:       General: She is not in acute distress.     Appearance: She is well-developed.   HENT:      Head: Normocephalic and atraumatic.   Eyes:      General: No scleral icterus.     Conjunctiva/sclera: Conjunctivae normal.   Cardiovascular:      Rate and Rhythm: Normal rate and regular rhythm.      Heart sounds: No murmur heard.  Pulmonary:      Effort: Pulmonary effort is normal. No respiratory distress.      Breath sounds: Normal breath sounds.   Abdominal:      General: Bowel sounds are normal. There is no distension.      Palpations: Abdomen is soft. There is no mass.      Tenderness: There is no abdominal tenderness. There is no guarding or rebound.   Skin:     General: Skin is warm and dry.      Coloration: Skin is not jaundiced.   Neurological:      General: No focal deficit present.      Mental Status: She is alert and oriented to person, place, and time.   Psychiatric:         Mood and Affect: Mood normal.       Lab Results:   No visits with results within 1 Day(s) from this visit.   Latest known visit with results is:   Appointment on 09/17/2024   Component Date Value    TISSUE TRANSGLUTAMINASE * 09/17/2024 <2     IGA 09/17/2024 232     WBC 09/17/2024 9.45     RBC 09/17/2024 4.58     Hemoglobin 09/17/2024 13.5     Hematocrit 09/17/2024 40.4     MCV 09/17/2024 88     MCH 09/17/2024 29.5     MCHC 09/17/2024 33.4     RDW 09/17/2024 12.8     MPV 09/17/2024 12.3     Platelets 09/17/2024 252     nRBC 09/17/2024 0     Segmented % 09/17/2024 49     Immature Grans % 09/17/2024 0     Lymphocytes % 09/17/2024 41     Monocytes % 09/17/2024 8     Eosinophils Relative 09/17/2024 1     Basophils Relative 09/17/2024 1     Absolute Neutrophils 09/17/2024 4.72     Absolute Immature Grans 09/17/2024 0.02     Absolute Lymphocytes 09/17/2024 3.84     Absolute Monocytes 09/17/2024 0.71     Eosinophils Absolute 09/17/2024 0.10     Basophils Absolute  09/17/2024 0.06     CRP 09/17/2024 16.8 (H)     TSH 3RD GENERATON 09/17/2024 1.243     Calprotectin, Fecal 09/17/2024 10.7     C.difficile toxin by PCR 09/17/2024 Negative        Radiology Results:   No results found.    Latisha Gupta PA-C    **Please note:  Dictation voice to text software may have been used in the creation of this record.  Occasional wrong word or “sound alike” substitutions may have occurred due to the inherent limitations of voice recognition software.  Read the chart carefully and recognize, using context, where substitutions have occurred.**

## 2024-09-19 NOTE — ASSESSMENT & PLAN NOTE
Orders:    dicyclomine (BENTYL) 20 mg tablet; Take 1 tablet (20 mg total) by mouth every 6 (six) hours as needed (abdominal cramping and diarrhea)

## 2024-09-27 ENCOUNTER — HOSPITAL ENCOUNTER (EMERGENCY)
Facility: HOSPITAL | Age: 24
Discharge: HOME | End: 2024-09-27
Attending: EMERGENCY MEDICINE
Payer: COMMERCIAL

## 2024-09-27 VITALS
RESPIRATION RATE: 12 BRPM | DIASTOLIC BLOOD PRESSURE: 63 MMHG | OXYGEN SATURATION: 99 % | SYSTOLIC BLOOD PRESSURE: 114 MMHG | HEART RATE: 82 BPM | WEIGHT: 153 LBS | TEMPERATURE: 98.4 F | BODY MASS INDEX: 24.69 KG/M2

## 2024-09-27 DIAGNOSIS — R19.7 DIARRHEA: ICD-10-CM

## 2024-09-27 DIAGNOSIS — R55 SYNCOPE: Primary | ICD-10-CM

## 2024-09-27 DIAGNOSIS — R94.31 SHORTENED PR INTERVAL: ICD-10-CM

## 2024-09-27 LAB
ANION GAP SERPL CALCULATED.3IONS-SCNC: 7 MMOL/L (ref 4–13)
ATRIAL RATE: 84 BPM
BACTERIA UR QL AUTO: ABNORMAL /HPF
BILIRUB UR QL STRIP: NEGATIVE
BUN SERPL-MCNC: 14 MG/DL (ref 5–25)
CALCIUM SERPL-MCNC: 9.3 MG/DL (ref 8.4–10.2)
CHLORIDE SERPL-SCNC: 105 MMOL/L (ref 96–108)
CLARITY UR: CLEAR
CO2 SERPL-SCNC: 26 MMOL/L (ref 21–32)
COLOR UR: YELLOW
CREAT SERPL-MCNC: 0.55 MG/DL (ref 0.6–1.3)
EXT PREGNANCY TEST URINE: NEGATIVE
EXT. CONTROL: NORMAL
GFR SERPL CREATININE-BSD FRML MDRD: 131 ML/MIN/1.73SQ M
GLUCOSE SERPL-MCNC: 86 MG/DL (ref 65–140)
GLUCOSE UR STRIP-MCNC: NEGATIVE MG/DL
HGB UR QL STRIP.AUTO: ABNORMAL
KETONES UR STRIP-MCNC: NEGATIVE MG/DL
LEUKOCYTE ESTERASE UR QL STRIP: ABNORMAL
MUCOUS THREADS UR QL AUTO: ABNORMAL
NITRITE UR QL STRIP: NEGATIVE
NON-SQ EPI CELLS URNS QL MICRO: ABNORMAL /HPF
P AXIS: 64 DEGREES
PH UR STRIP.AUTO: 7.5 [PH] (ref 4.5–8)
POTASSIUM SERPL-SCNC: 3.8 MMOL/L (ref 3.5–5.3)
PR INTERVAL: 110 MS
PROT UR STRIP-MCNC: NEGATIVE MG/DL
QRS AXIS: 89 DEGREES
QRSD INTERVAL: 86 MS
QT INTERVAL: 392 MS
QTC INTERVAL: 463 MS
RBC #/AREA URNS AUTO: ABNORMAL /HPF
SODIUM SERPL-SCNC: 138 MMOL/L (ref 135–147)
SP GR UR STRIP.AUTO: 1.02 (ref 1–1.03)
T WAVE AXIS: 66 DEGREES
UROBILINOGEN UR QL STRIP.AUTO: 0.2 E.U./DL
VENTRICULAR RATE: 84 BPM
WBC #/AREA URNS AUTO: ABNORMAL /HPF

## 2024-09-27 PROCEDURE — 81025 URINE PREGNANCY TEST: CPT | Performed by: EMERGENCY MEDICINE

## 2024-09-27 PROCEDURE — 93005 ELECTROCARDIOGRAM TRACING: CPT

## 2024-09-27 PROCEDURE — 36415 COLL VENOUS BLD VENIPUNCTURE: CPT | Performed by: EMERGENCY MEDICINE

## 2024-09-27 PROCEDURE — 80048 BASIC METABOLIC PNL TOTAL CA: CPT | Performed by: EMERGENCY MEDICINE

## 2024-09-27 PROCEDURE — 99285 EMERGENCY DEPT VISIT HI MDM: CPT | Performed by: EMERGENCY MEDICINE

## 2024-09-27 PROCEDURE — 96360 HYDRATION IV INFUSION INIT: CPT

## 2024-09-27 PROCEDURE — 93010 ELECTROCARDIOGRAM REPORT: CPT

## 2024-09-27 PROCEDURE — 99284 EMERGENCY DEPT VISIT MOD MDM: CPT

## 2024-09-27 PROCEDURE — 81001 URINALYSIS AUTO W/SCOPE: CPT

## 2024-09-27 RX ADMIN — SODIUM CHLORIDE 1000 ML: 0.9 INJECTION, SOLUTION INTRAVENOUS at 11:45

## 2024-09-27 NOTE — ED PROVIDER NOTES
Final diagnoses:   Syncope   Diarrhea   Shortened SD interval     ED Disposition       ED Disposition   Discharge    Condition   Stable    Date/Time   Fri Sep 27, 2024 12:58 PM    Comment   Sheila Oakes discharge to home/self care.                   Assessment & Plan       Medical Decision Making  A 23-year-old female presents after a suspected syncope, less likely seizure.  Patient does appear dehydrated with dry mucous membranes, likely secondary to ongoing diarrhea.  Will check electrolytes, urine pregnancy.  Will check EKG for arrhythmia.  Will start IV fluid and reassess.    Amount and/or Complexity of Data Reviewed  Labs: ordered. Decision-making details documented in ED Course.        ED Course as of 09/27/24 1341   Fri Sep 27, 2024   1205 POCT pregnancy, urine  Negative    1250 Basic metabolic panel(!)  WNL   1257 Pt feeling better at this time, no recurrence of symptoms.  Updated on labs.  Also discussed short SD interval noted again on EKG today, discussed follow up with cardiology for further evaluation.  Pt in agreement, will place referral.       Medications   sodium chloride 0.9 % bolus 1,000 mL (0 mL Intravenous Stopped 9/27/24 1304)       ED Risk Strat Scores                           SBIRT 22yo+      Flowsheet Row Most Recent Value   Initial Alcohol Screen: US AUDIT-C     1. How often do you have a drink containing alcohol? 0 Filed at: 09/27/2024 1131   2. How many drinks containing alcohol do you have on a typical day you are drinking?  0 Filed at: 09/27/2024 1131   3b. FEMALE Any Age, or MALE 65+: How often do you have 4 or more drinks on one occassion? 0 Filed at: 09/27/2024 1131   Audit-C Score 0 Filed at: 09/27/2024 1131   YVONNE: How many times in the past year have you...    Used an illegal drug or used a prescription medication for non-medical reasons? Never Filed at: 09/27/2024 1131                            History of Present Illness       Chief Complaint   Patient presents with     Syncope     Pt was at vet office with friend and passed out. + head strike, - bruising, - lacerations. Friend reports she started to shake for a minute after the fall. Pt reports she remembers feeling hot and having tunnel vision. Has had loose stools for a week.        Past Medical History:   Diagnosis Date    Anxiety     Bilateral ovarian cysts     Kidney stones       Past Surgical History:   Procedure Laterality Date    ADENOIDECTOMY      TONSILECTOMY AND ADNOIDECTOMY      TONSILLECTOMY      TYMPANOSTOMY      TYMPANOSTOMY TUBE PLACEMENT      6 sets - last time as a child    WISDOM TOOTH EXTRACTION        Family History   Problem Relation Age of Onset    Fibroids Mother     GI problems Father     Seizures Sister     Arthritis Maternal Grandmother       Social History     Tobacco Use    Smoking status: Never     Passive exposure: Never    Smokeless tobacco: Never   Vaping Use    Vaping status: Never Used   Substance Use Topics    Alcohol use: Yes     Comment: socially     Drug use: No      E-Cigarette/Vaping    E-Cigarette Use Never User       E-Cigarette/Vaping Substances    Nicotine No     THC No     CBD No     Flavoring No     Other No     Unknown No       I have reviewed and agree with the history as documented.     A 23-year-old female with no significant past medical history; presents after a suspected syncopal episode.  Patient states she was seated at the vet office with her friend, when she developed tunnel vision and lightheadedness followed by loss of consciousness.  Event was witnessed by her friend who reports she was unconscious for about 45 seconds before regaining consciousness.  Patient did have body twitching while unconscious.  Upon regaining consciousness, patient quickly returned to baseline and did not have a postictal period.  She also denies tongue biting and urinary incontinence.  Currently patient offers no complaints.  She denies fever, chills, chest pain, shortness of breath, abdominal  pain, vomiting, peripheral edema and rashes.  She does report ongoing diarrhea over the past week.      History provided by:  Patient and medical records  Syncope      Review of Systems   Cardiovascular:  Positive for syncope.   Gastrointestinal:  Positive for diarrhea.   Neurological:  Positive for syncope.   All other systems reviewed and are negative.          Objective       ED Triage Vitals [09/27/24 1122]   Temperature Pulse Blood Pressure Respirations SpO2 Patient Position - Orthostatic VS   98.4 °F (36.9 °C) 82 114/63 12 99 % Lying      Temp src Heart Rate Source BP Location FiO2 (%) Pain Score    -- Monitor Right arm -- No Pain      Vitals      Date and Time Temp Pulse SpO2 Resp BP Pain Score FACES Pain Rating User   09/27/24 1132 -- -- -- -- -- No Pain -- MLR   09/27/24 1122 98.4 °F (36.9 °C) 82 99 % 12 114/63 No Pain -- MLR            Physical Exam  General Appearance: alert and oriented, nad, non toxic appearing  Skin:  Warm, dry, intact.  No cyanosis  HEENT: Atraumatic, normocephalic.  No eye drainage.  Normal hearing.  Dry mucous membranes.    Neck: Supple, trachea midline  Cardiac: RRR; no murmurs, rub, gallops.  No pedal edema, 2+ pulses  Pulmonary: lungs CTAB; no wheezes, rales, rhonchi  Gastrointestinal: abdomen soft, nontender, nondistended; no guarding or rebound tenderness; good bowel sounds, no mass or bruits  Extremities:  No deformities.  No calf tenderness, no clubbing  Neuro:  no focal motor or sensory deficits, CN 2-12 grossly intact  Psych:  Normal mood and affect, normal judgement and insight       Results Reviewed       Procedure Component Value Units Date/Time    Basic metabolic panel [[id number]]  (Abnormal) Collected: 09/27/24 1142    Lab Status: Final result Specimen: Blood from Arm, Left Updated: 09/27/24 1250     Sodium 138 mmol/L      Potassium 3.8 mmol/L      Chloride 105 mmol/L      CO2 26 mmol/L      ANION GAP 7 mmol/L      BUN 14 mg/dL      Creatinine 0.55 mg/dL      Glucose  86 mg/dL      Calcium 9.3 mg/dL      eGFR 131 ml/min/1.73sq m     Narrative:      National Kid[employer] Foundation guidelines for Chronic Kidney Disease (CKD):     Stage 1 with normal or high GFR (GFR > 90 mL/min/1.73 square meters)    Stage 2 Mild CKD (GFR = 60-89 mL/min/1.73 square meters)    Stage 3A Moderate CKD (GFR = 45-59 mL/min/1.73 square meters)    Stage 3B Moderate CKD (GFR = 30-44 mL/min/1.73 square meters)    Stage 4 Severe CKD (GFR = 15-29 mL/min/1.73 square meters)    Stage 5 End Stage CKD (GFR <15 mL/min/1.73 square meters)  Note: GFR calculation is accurate only with a steady state creatinine    Urine Microscopic [[id number]]  (Abnormal) Collected: 09/27/24 1150    Lab Status: Final result Specimen: Urine, Clean Catch Updated: 09/27/24 1243     RBC, UA 1-2 /hpf      WBC, UA 4-10 /hpf      Epithelial Cells Occasional /hpf      Bacteria, UA None Seen /hpf      MUCUS THREADS Moderate    POCT pregnancy, urine [[id number]]  (Normal) Resulted: 09/27/24 1154    Lab Status: Final result Updated: 09/27/24 1155     EXT Preg Test, Ur Negative     Control Valid    Urine Macroscopic, POC [[id number]]  (Abnormal) Collected: 09/27/24 1150    Lab Status: Final result Specimen: Urine Updated: 09/27/24 1152     Color, UA Yellow     Clarity, UA Clear     pH, UA 7.5     Leukocytes, UA Trace     Nitrite, UA Negative     Protein, UA Negative mg/dl      Glucose, UA Negative mg/dl      Ketones, UA Negative mg/dl      Urobilinogen, UA 0.2 E.U./dl      Bilirubin, UA Negative     Occult Blood, UA Trace     Specific Gravity, UA 1.025    Narrative:      CLINITEK RESULT            No orders to display       Procedures  ECG 12 Lead Documentation  Date/Time: today/date: 9/27/2024  Performed by: Veronica Reese    ECG reviewed by me, the ED Provider: yes    Patient location:  ED   Previous ECG:  Compared to current, no change   Rate:  84  ECG rate assessment: normal    Rhythm: sinus rhythm    Ectopy:  none    QRS axis:   Normal  Intervals: short WI interval   Q waves: None   ST segments:  Normal  T waves: normal      Impression: NSR with short WI interval (stable)      ED Medication and Procedure Management   Prior to Admission Medications   Prescriptions Last Dose Informant Patient Reported? Taking?   albuterol (ProAir HFA) 90 mcg/act inhaler   No No   Sig: Inhale 2 puffs every 4 (four) hours as needed for wheezing or shortness of breath   amphetamine-dextroamphetamine (ADDERALL XR, 15MG,) 15 MG 24 hr capsule   No No   Sig: Take 1 capsule (15 mg total) by mouth every morning Max Daily Amount: 15 mg   dicyclomine (BENTYL) 20 mg tablet   No No   Sig: Take 1 tablet (20 mg total) by mouth every 6 (six) hours as needed (abdominal cramping and diarrhea)   fexofenadine (ALLEGRA) 180 MG tablet  Self Yes No   Sig: Take 180 mg by mouth as needed   lactase (LACTAID) 3,000 units tablet   No No   Sig: Take 1 tablet (3,000 Units total) by mouth 3 (three) times a day with meals   norgestimate-ethinyl estradiol (TriNessa, 28,) 0.18/0.215/0.25 MG-35 MCG per tablet   No No   Sig: Take 1 tablet by mouth daily   ondansetron (ZOFRAN) 4 mg tablet   No No   Sig: Take 1 tablet (4 mg total) by mouth every 8 (eight) hours as needed for nausea or vomiting      Facility-Administered Medications: None     Discharge Medication List as of 9/27/2024  1:00 PM        CONTINUE these medications which have NOT CHANGED    Details   albuterol (ProAir HFA) 90 mcg/act inhaler Inhale 2 puffs every 4 (four) hours as needed for wheezing or shortness of breath, Starting Wed 5/15/2024, Normal      amphetamine-dextroamphetamine (ADDERALL XR, 15MG,) 15 MG 24 hr capsule Take 1 capsule (15 mg total) by mouth every morning Max Daily Amount: 15 mg, Starting Tue 9/3/2024, Normal      dicyclomine (BENTYL) 20 mg tablet Take 1 tablet (20 mg total) by mouth every 6 (six) hours as needed (abdominal cramping and diarrhea), Starting Thu 9/19/2024, Normal      fexofenadine (ALLEGRA) 180 MG  tablet Take 180 mg by mouth as needed, Historical Med      lactase (LACTAID) 3,000 units tablet Take 1 tablet (3,000 Units total) by mouth 3 (three) times a day with meals, Starting Thu 9/19/2024, Normal      norgestimate-ethinyl estradiol (TriNessa, 28,) 0.18/0.215/0.25 MG-35 MCG per tablet Take 1 tablet by mouth daily, Starting Fri 2/2/2024, Until Sat 2/1/2025, Normal      ondansetron (ZOFRAN) 4 mg tablet Take 1 tablet (4 mg total) by mouth every 8 (eight) hours as needed for nausea or vomiting, Starting Thu 9/19/2024, Normal             ED SEPSIS DOCUMENTATION   Time reflects when diagnosis was documented in both MDM as applicable and the Disposition within this note       Time User Action Codes Description Comment    9/27/2024 12:58 PM Veronica Reese [R55] Syncope     9/27/2024 12:59 PM Veronica Reese [R19.7] Diarrhea     9/27/2024 12:59 PM Veronica Reese [R94.31] Shortened MO interval                  Veronica Reese DO  09/27/24 1346

## 2024-09-30 ENCOUNTER — HOSPITAL ENCOUNTER (OUTPATIENT)
Dept: CT IMAGING | Facility: HOSPITAL | Age: 24
Discharge: HOME | End: 2024-09-30
Attending: INTERNAL MEDICINE
Payer: COMMERCIAL

## 2024-09-30 ENCOUNTER — OFFICE VISIT (OUTPATIENT)
Dept: FAMILY MEDICINE CLINIC | Facility: CLINIC | Age: 24
End: 2024-09-30
Payer: COMMERCIAL

## 2024-09-30 VITALS
HEART RATE: 100 BPM | SYSTOLIC BLOOD PRESSURE: 116 MMHG | RESPIRATION RATE: 16 BRPM | DIASTOLIC BLOOD PRESSURE: 70 MMHG | OXYGEN SATURATION: 97 % | BODY MASS INDEX: 23.88 KG/M2 | TEMPERATURE: 98.4 F | WEIGHT: 148.6 LBS | HEIGHT: 66 IN

## 2024-09-30 DIAGNOSIS — F07.81 POST CONCUSSIVE SYNDROME: ICD-10-CM

## 2024-09-30 DIAGNOSIS — R55 SYNCOPE, UNSPECIFIED SYNCOPE TYPE: Primary | ICD-10-CM

## 2024-09-30 DIAGNOSIS — Z82.0 FAMILY HISTORY OF EPILEPSY: ICD-10-CM

## 2024-09-30 DIAGNOSIS — R55 SYNCOPE, UNSPECIFIED SYNCOPE TYPE: ICD-10-CM

## 2024-09-30 DIAGNOSIS — S09.90XD TRAUMATIC INJURY OF HEAD, SUBSEQUENT ENCOUNTER: ICD-10-CM

## 2024-09-30 PROCEDURE — 99214 OFFICE O/P EST MOD 30 MIN: CPT | Performed by: INTERNAL MEDICINE

## 2024-09-30 PROCEDURE — 70450 CT HEAD/BRAIN W/O DYE: CPT

## 2024-09-30 NOTE — PATIENT INSTRUCTIONS
Patient Education     Post-Concussion Syndrome ED   General Information   You came to the Emergency Department (ED) and the doctors believe you have post-concussion syndrome. This is a condition that develops after a concussion or mild brain injury. A concussion usually happens after a hit to the head. But in some cases, it can happen after an injury or accident that causes violent shaking of the head.  Most people start to feel better in a week or 2. But it can take longer, even a few months before you feel completely normal.  What care is needed at home?   Call your regular doctor to let them know you were in the ED. Make a follow-up appointment if you were told to.  Rest your body. Get plenty of sleep. Alternate rest with light activity like walking. Avoid heavy exercise if it makes you feel worse.  Rest your brain. For the first day, stay away from doing things that need a lot of thought or focus. Stay away from TV, computers, phone screens, and video games. After the first day, you can slowly start doing these activities again. Stop them if they make you feel worse.  If your head hurts, you may want to take medicine like acetaminophen. If your doctor said it was OK, you may also take medicines like ibuprofen or naproxen to help with pain.  Do not drink alcohol.  Do not return to sport activities until you are cleared by your doctor.  When do I need to get emergency help?   Call for an ambulance right away if:   You have trouble waking up from sleep and remain groggy or confused once awake.  While you are awake, you become confused or have trouble thinking clearly.  You have trouble speaking or seeing.  You have trouble walking or cannot move a part of your body like an arm or leg.  You have a seizure.  You develop severe or worsening headaches.  You start throwing up.  When do I need to call the doctor?   You cannot concentrate enough to finish a familiar task.  You start having headaches more often or they are  getting worse.  You feel weaker or more tired than normal.  You have new or worsening symptoms.  Last Reviewed Date   2021-03-09  Consumer Information Use and Disclaimer   This generalized information is a limited summary of diagnosis, treatment, and/or medication information. It is not meant to be comprehensive and should be used as a tool to help the user understand and/or assess potential diagnostic and treatment options. It does NOT include all information about conditions, treatments, medications, side effects, or risks that may apply to a specific patient. It is not intended to be medical advice or a substitute for the medical advice, diagnosis, or treatment of a health care provider based on the health care provider's examination and assessment of a patient’s specific and unique circumstances. Patients must speak with a health care provider for complete information about their health, medical questions, and treatment options, including any risks or benefits regarding use of medications. This information does not endorse any treatments or medications as safe, effective, or approved for treating a specific patient. [employer] and its affiliates disclaim any warranty or liability relating to this information or the use thereof. The use of this information is governed by the Terms of Use, available at https://www.woltersSCLuwer.com/en/know/clinical-effectiveness-terms   Copyright   Copyright © 2024 [employer] and its affiliates and/or licensors. All rights reserved.

## 2024-09-30 NOTE — PROGRESS NOTES
"Ambulatory Visit  Name: Sheila Oakes      : 2000      MRN: [id number]  Encounter Provider: Ayala Jc MD  Encounter Date: 2024   Encounter department: [facility]    Assessment & Plan  Syncope, unspecified syncope type  Possibly vasovagal syncope, but given head trauma and now headache, dizziness, nausea, will get CT and treat for post-concussive syndrome. Discussed neurology evaluation given family history of seizures.   Orders:    CT head wo contrast; Future    Ambulatory Referral to Neurology; Future    Traumatic injury of head, subsequent encounter    Orders:    CT head wo contrast; Future    Family history of epilepsy         Post concussive syndrome    Orders:    CT head wo contrast; Future    Ambulatory Referral to Neurology; Future       History of Present Illness     22yo female here for ER follow up. Reports she was at a vet appointment, felt tunnel and blurry vision then blacked out and fell over, hitting her head off the wall. Bystanders reported general convulsions, no micturition or tongue-biting, however. Lasted about 45 seconds to 1 minute. Afterward, wasn't confused but diaphoretic and states that she thought she took a nap. EMS called and , taken to ED. Labs unremarkable, ECG showing sinus rhythm with known short CT. Given IVF and discharged home. Reports feeling groggy, headache and dizziness since Saturday.           Review of Systems   Constitutional:  Positive for fatigue. Negative for chills and fever.   Respiratory:  Negative for shortness of breath.    Cardiovascular:  Negative for chest pain and palpitations.   Gastrointestinal:  Negative for nausea and vomiting.   Neurological:  Positive for dizziness and headaches.           Objective     /70   Pulse 100   Temp 98.4 °F (36.9 °C)   Resp 16   Ht 5' 6\" (1.676 m)   Wt 67.4 kg (148 lb 9.6 oz)   LMP 2024 (Within Days)   SpO2 97%   BMI 23.98 kg/m²     Physical " Exam  Vitals reviewed.   Constitutional:       General: She is not in acute distress.     Appearance: She is normal weight.   HENT:      Head: Normocephalic and atraumatic.   Eyes:      Extraocular Movements: Extraocular movements intact.      Pupils: Pupils are equal, round, and reactive to light.   Cardiovascular:      Rate and Rhythm: Normal rate and regular rhythm.      Heart sounds: No murmur heard.     No friction rub. No gallop.   Pulmonary:      Effort: Pulmonary effort is normal. No respiratory distress.      Breath sounds: No wheezing, rhonchi or rales.   Skin:     General: Skin is warm and dry.   Neurological:      Mental Status: She is alert.      Cranial Nerves: No dysarthria or facial asymmetry.      Motor: No weakness.      Gait: Gait is intact.   Psychiatric:         Mood and Affect: Mood and affect normal.         Behavior: Behavior normal. Behavior is cooperative.

## 2024-10-02 ENCOUNTER — PATIENT MESSAGE (OUTPATIENT)
Dept: FAMILY MEDICINE CLINIC | Facility: CLINIC | Age: 24
End: 2024-10-02

## 2024-10-02 DIAGNOSIS — F07.81 POST CONCUSSIVE SYNDROME: Primary | ICD-10-CM

## 2024-10-03 RX ORDER — MECLIZINE HYDROCHLORIDE 25 MG/1
25 TABLET ORAL 3 TIMES DAILY PRN
Qty: 30 TABLET | Refills: 0 | Status: SHIPPED | OUTPATIENT
Start: 2024-10-03

## 2024-10-07 ENCOUNTER — TELEPHONE (OUTPATIENT)
Age: 24
End: 2024-10-07

## 2024-10-08 ENCOUNTER — OFFICE VISIT (OUTPATIENT)
Dept: OBGYN CLINIC | Facility: CLINIC | Age: 24
End: 2024-10-08
Payer: COMMERCIAL

## 2024-10-08 VITALS
HEIGHT: 66 IN | HEART RATE: 97 BPM | DIASTOLIC BLOOD PRESSURE: 77 MMHG | TEMPERATURE: 100.9 F | WEIGHT: 148.6 LBS | BODY MASS INDEX: 23.88 KG/M2 | SYSTOLIC BLOOD PRESSURE: 112 MMHG

## 2024-10-08 DIAGNOSIS — G44.319 ACUTE POST-TRAUMATIC HEADACHE, NOT INTRACTABLE: ICD-10-CM

## 2024-10-08 DIAGNOSIS — H53.9 VISION DISORDER: ICD-10-CM

## 2024-10-08 DIAGNOSIS — R42 DIZZINESS: ICD-10-CM

## 2024-10-08 DIAGNOSIS — R41.840 DIFFICULTY CONCENTRATING: ICD-10-CM

## 2024-10-08 DIAGNOSIS — S06.0X0A CONCUSSION WITHOUT LOSS OF CONSCIOUSNESS, INITIAL ENCOUNTER: Primary | ICD-10-CM

## 2024-10-08 PROCEDURE — 99204 OFFICE O/P NEW MOD 45 MIN: CPT | Performed by: FAMILY MEDICINE

## 2024-10-08 NOTE — PATIENT INSTRUCTIONS
F/u 2 wks  Follow up 1 wk  Continue screen time  as tolerated.  Tylenol/motrin as needed sparingly.  Begin exercising- walking, jogging, stationary bike for 30 min daily as tolerated.  No work  Begin physical therapy.  Ibuprofen 200-800 mg every 8 hrs as needed with food.  Aleve 220-440 mg 2x daily as needed.
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
## 2024-10-08 NOTE — LETTER
October 8, 2024     Patient: Sheila Oakes  YOB: 2000  Date of Visit: 10/8/2024      To Whom it May Concern:    Sheila Oakes is under my professional care. Sheila was seen in my office on 10/8/2024. Sheila is excused from work due to the medical condition I am treating her for from 10/8/24-10/22/24.    If you have any questions or concerns, please don't hesitate to call.         Sincerely,          Amos Duval MD        CC: No Recipients

## 2024-10-10 ENCOUNTER — OFFICE VISIT (OUTPATIENT)
Dept: FAMILY MEDICINE CLINIC | Facility: CLINIC | Age: 24
End: 2024-10-10
Payer: COMMERCIAL

## 2024-10-10 VITALS
WEIGHT: 149 LBS | HEART RATE: 112 BPM | OXYGEN SATURATION: 98 % | SYSTOLIC BLOOD PRESSURE: 136 MMHG | BODY MASS INDEX: 23.95 KG/M2 | HEIGHT: 66 IN | TEMPERATURE: 97.9 F | DIASTOLIC BLOOD PRESSURE: 80 MMHG

## 2024-10-10 DIAGNOSIS — R55 SYNCOPE, UNSPECIFIED SYNCOPE TYPE: ICD-10-CM

## 2024-10-10 DIAGNOSIS — S06.0X0D CONCUSSION WITHOUT LOSS OF CONSCIOUSNESS, SUBSEQUENT ENCOUNTER: Primary | ICD-10-CM

## 2024-10-10 DIAGNOSIS — H53.149 PHOTOPHOBIA: ICD-10-CM

## 2024-10-10 PROCEDURE — 99213 OFFICE O/P EST LOW 20 MIN: CPT | Performed by: INTERNAL MEDICINE

## 2024-10-10 NOTE — LETTER
October 10, 2024     Patient: Sheila Oakes  YOB: 2000  Date of Visit: 10/10/2024      To Whom it May Concern:    Sheila Oakes is under my professional care. Sheila was seen in my office on 10/10/2024. Sheila may return to working remotely October 14, 2024 with the following restrictions: wearing blue light glasses, dimmed computer screen, frequent breaks if needed, until cleared by Dr. Duval.    If you have any questions or concerns, please don't hesitate to call.         Sincerely,          Ayala Jc MD        CC: No Recipients

## 2024-10-10 NOTE — PATIENT INSTRUCTIONS
I will reach out to Dr. Duval about returning to work 10/14 with restrictions until released to full duty.

## 2024-10-10 NOTE — PROGRESS NOTES
Ambulatory Visit  Name: Sheila Oakes      : 2000      MRN: [id number]  Encounter Provider: Ayala Jc MD  Encounter Date: 10/10/2024   Encounter department: [facility]    Assessment & Plan  Syncope, unspecified syncope type         Concussion without loss of consciousness, subsequent encounter  - discussed returning remotely with restrictions until re-evaluated by Dr. Duval.        Photophobia            History of Present Illness     24yo female with recent syncopal episode, head trauma and concussion without LOC here to discuss return to work restrictions.         Review of Systems   Constitutional:  Positive for fatigue. Negative for chills and fever.   Eyes:  Positive for photophobia and visual disturbance.   Neurological:  Positive for dizziness, light-headedness and headaches.     Current Outpatient Medications on File Prior to Visit   Medication Sig Dispense Refill    albuterol (ProAir HFA) 90 mcg/act inhaler Inhale 2 puffs every 4 (four) hours as needed for wheezing or shortness of breath 8.5 g 1    dicyclomine (BENTYL) 20 mg tablet Take 1 tablet (20 mg total) by mouth every 6 (six) hours as needed (abdominal cramping and diarrhea) 30 tablet 1    fexofenadine (ALLEGRA) 180 MG tablet Take 180 mg by mouth as needed      lactase (LACTAID) 3,000 units tablet Take 1 tablet (3,000 Units total) by mouth 3 (three) times a day with meals 90 tablet 0    meclizine (ANTIVERT) 25 mg tablet Take 1 tablet (25 mg total) by mouth 3 (three) times a day as needed for dizziness 30 tablet 0    norgestimate-ethinyl estradiol (TriNessa, 28,) 0.18/0.215/0.25 MG-35 MCG per tablet Take 1 tablet by mouth daily 84 tablet 3    ondansetron (ZOFRAN) 4 mg tablet Take 1 tablet (4 mg total) by mouth every 8 (eight) hours as needed for nausea or vomiting 20 tablet 0    amphetamine-dextroamphetamine (ADDERALL XR, 15MG,) 15 MG 24 hr capsule Take 1 capsule (15 mg total) by mouth every morning Max  "Daily Amount: 15 mg (Patient not taking: Reported on 9/30/2024) 30 capsule 0     No current facility-administered medications on file prior to visit.          Objective     /80   Pulse (!) 112   Temp 97.9 °F (36.6 °C)   Ht 5' 6\" (1.676 m)   Wt 67.6 kg (149 lb)   LMP 09/05/2024 (Within Days)   SpO2 98%   BMI 24.05 kg/m²     Physical Exam  Vitals reviewed.   Constitutional:       General: She is not in acute distress.     Appearance: She is normal weight.   Pulmonary:      Effort: No respiratory distress.   Neurological:      General: No focal deficit present.      Mental Status: She is alert.   Psychiatric:         Mood and Affect: Mood normal.         Behavior: Behavior normal.         "

## 2024-10-16 ENCOUNTER — EVALUATION (OUTPATIENT)
Dept: PHYSICAL THERAPY | Facility: CLINIC | Age: 24
End: 2024-10-16
Payer: COMMERCIAL

## 2024-10-16 DIAGNOSIS — H53.9 VISION DISORDER: ICD-10-CM

## 2024-10-16 DIAGNOSIS — G44.319 ACUTE POST-TRAUMATIC HEADACHE, NOT INTRACTABLE: ICD-10-CM

## 2024-10-16 DIAGNOSIS — S06.0X0A CONCUSSION WITHOUT LOSS OF CONSCIOUSNESS, INITIAL ENCOUNTER: ICD-10-CM

## 2024-10-16 DIAGNOSIS — R42 DIZZINESS: ICD-10-CM

## 2024-10-16 DIAGNOSIS — R41.840 DIFFICULTY CONCENTRATING: ICD-10-CM

## 2024-10-16 PROCEDURE — 97162 PT EVAL MOD COMPLEX 30 MIN: CPT | Performed by: PHYSICAL THERAPIST

## 2024-10-16 PROCEDURE — 97112 NEUROMUSCULAR REEDUCATION: CPT | Performed by: PHYSICAL THERAPIST
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
## 2024-10-22 ENCOUNTER — OFFICE VISIT (OUTPATIENT)
Dept: OBGYN CLINIC | Facility: CLINIC | Age: 24
End: 2024-10-22
Payer: COMMERCIAL

## 2024-10-22 VITALS
WEIGHT: 149.8 LBS | SYSTOLIC BLOOD PRESSURE: 144 MMHG | TEMPERATURE: 99.7 F | HEART RATE: 106 BPM | DIASTOLIC BLOOD PRESSURE: 84 MMHG | BODY MASS INDEX: 24.08 KG/M2 | HEIGHT: 66 IN

## 2024-10-22 DIAGNOSIS — H53.9 VISION DISORDER: ICD-10-CM

## 2024-10-22 DIAGNOSIS — S06.0X0D CONCUSSION WITHOUT LOSS OF CONSCIOUSNESS, SUBSEQUENT ENCOUNTER: Primary | ICD-10-CM

## 2024-10-22 DIAGNOSIS — R42 DIZZINESS: ICD-10-CM

## 2024-10-22 DIAGNOSIS — G44.319 ACUTE POST-TRAUMATIC HEADACHE, NOT INTRACTABLE: ICD-10-CM

## 2024-10-22 DIAGNOSIS — R41.840 DIFFICULTY CONCENTRATING: ICD-10-CM

## 2024-10-22 PROCEDURE — 99214 OFFICE O/P EST MOD 30 MIN: CPT | Performed by: FAMILY MEDICINE

## 2024-10-22 NOTE — PATIENT INSTRUCTIONS
Follow up 2 wks  Continue screen time as tolerated.  Tylenol/motrin as needed sparingly.  Continue exercising- walking, jogging, stationary bike for 30 min daily as tolerated.  No contact sports or weight training.  Continue working from home with breaks as needed.  Continue therapy/home exercises.

## 2024-10-22 NOTE — LETTER
October 22, 2024     Patient: Sheila Oakes  YOB: 2000  Date of Visit: 10/22/2024      To Whom it May Concern:    Sheila Oakes is under my professional care. Sheila was seen in my office on 10/22/2024. Sheila may return to work with limitations -  please allow Sheila to work from home due to concussion.  Please allow frequent breaks as needed .    If you have any questions or concerns, please don't hesitate to call.         Sincerely,          Amos Duval MD        CC: No Recipients

## 2024-10-22 NOTE — PROGRESS NOTES
Assessment & Plan      Assessment:    1. Concussion without loss of consciousness, subsequent encounter  2. Acute post-traumatic headache, not intractable  3. Dizziness  4. Difficulty concentrating  5. Vision disorder        Plan:    Patient Instructions   Follow up 2 wks  Continue screen time as tolerated.  Tylenol/motrin as needed sparingly.  Continue exercising- walking, jogging, stationary bike for 30 min daily as tolerated.  No contact sports or weight training.  Continue working from home with breaks as needed.  Continue therapy/home exercises.     Return in about 2 weeks (around 11/5/2024) for Recheck.      Subjective        Chief Complaint:   Concussion (Follow up)      [facility]    School: n/a  Related to:  fainted at vet    School Status:     Sheila Oakes is a 23 y.o. female who presents today for follow up of concussion    She is about the same  Dizziness with exercising.  Issues with concentration  Started PT  Started work from home- HA throughout the day.  Ibuprofen PRN_ helps sometimes        LThe current concussion symptom score is 11/22 including headache, visual problem, dizziness, sensitivity to light, sensitivity to noise, foggy, slowed down, difficulty concentrating, difficulty remembering, drowsiness, and sleeping more  Do symptoms worsen with Physical Activity?  Yes dizziness  Do symptoms worsen with Cognitive Activity?  Yes HA  Overall Rating:  What percent is this person back to normal?  Patient 65 %    Review of Systems   Constitutional:  Negative for fatigue and fever.   Respiratory:  Negative for shortness of breath.    Cardiovascular:  Negative for chest pain.   Gastrointestinal:  Negative for abdominal pain and nausea.   Genitourinary:  Negative for dysuria.   Skin:  Negative for rash and wound.   Neurological:  Negative for weakness and headaches.     Symptoms Checklist      Flowsheet Row Most Recent Value   Physical    Headache 1   Nausea 0   Vomiting 0   Balance problems 0  "  Dizziness 1   Visual problems 1   Fatigue 0   Sensitivity to light 1   Sensitivity to noise 1   Numbness / tingling 0   TOTAL PHYSICAL SCORE 5   Cognitive    Foggy 1   Slowed down 1   Difficulty concentrating 1   Difficulty remembering 1   TOTAL COGNITIVE SCORE 4   Emotional    Irritability 0   Sadness 0   More emotional 0   Nervousness 0   TOTAL EMOTIONAL SCORE 0   Sleep    Drowsiness 1   Sleeping less 0   Sleeping more 1   Difficulty falling asleep 0   TOTAL SLEEP SCORE 2   TOTAL SYMPTOM SCORE 11          Symptoms Checklist      Flowsheet Row Most Recent Value   Physical    Headache 1   Nausea 0   Vomiting 0   Balance problems 0   Dizziness 1   Visual problems 1   Fatigue 0   Sensitivity to light 1   Sensitivity to noise 1   Numbness / tingling 0   TOTAL PHYSICAL SCORE 5   Cognitive    Foggy 1   Slowed down 1   Difficulty concentrating 1   Difficulty remembering 1   TOTAL COGNITIVE SCORE 4   Emotional    Irritability 0   Sadness 0   More emotional 0   Nervousness 0   TOTAL EMOTIONAL SCORE 0   Sleep    Drowsiness 1   Sleeping less 0   Sleeping more 1   Difficulty falling asleep 0   TOTAL SLEEP SCORE 2   TOTAL SYMPTOM SCORE 11                 Objective      /84 (BP Location: Left arm, Patient Position: Sitting, Cuff Size: Standard)   Pulse (!) 106 Comment: with pulse ox  Temp 99.7 °F (37.6 °C) (Tympanic)   Ht 5' 6\" (1.676 m)   Wt 67.9 kg (149 lb 12.8 oz)   LMP 09/05/2024 (Within Days)   BMI 24.18 kg/m²   Body mass index is 24.18 kg/m².   Patient Active Problem List   Diagnosis   • Allergic rhinitis due to pollen   • History of recurrent ear infection   • CIARA (generalized anxiety disorder)   • Annual physical exam   • Adult ADHD   • Neuralgia, geniculate   • Chest tightness   • Labyrinthitis of right ear   • History of CT scan of head   • Generalized abdominal pain   • Other headache syndrome   • Vitamin D deficiency   • B12 deficiency   • Other fatigue   • Irritable bowel syndrome with diarrhea   • " COVID-19 long hauler        Meds/Allergies   Current Outpatient Medications on File Prior to Visit   Medication Sig Dispense Refill   • albuterol (ProAir HFA) 90 mcg/act inhaler Inhale 2 puffs every 4 (four) hours as needed for wheezing or shortness of breath 8.5 g 1   • dicyclomine (BENTYL) 20 mg tablet Take 1 tablet (20 mg total) by mouth every 6 (six) hours as needed (abdominal cramping and diarrhea) 30 tablet 1   • fexofenadine (ALLEGRA) 180 MG tablet Take 180 mg by mouth as needed     • lactase (LACTAID) 3,000 units tablet Take 1 tablet (3,000 Units total) by mouth 3 (three) times a day with meals 90 tablet 0   • meclizine (ANTIVERT) 25 mg tablet Take 1 tablet (25 mg total) by mouth 3 (three) times a day as needed for dizziness 30 tablet 0   • norgestimate-ethinyl estradiol (TriNessa, 28,) 0.18/0.215/0.25 MG-35 MCG per tablet Take 1 tablet by mouth daily 84 tablet 3   • ondansetron (ZOFRAN) 4 mg tablet Take 1 tablet (4 mg total) by mouth every 8 (eight) hours as needed for nausea or vomiting 20 tablet 0   • amphetamine-dextroamphetamine (ADDERALL XR, 15MG,) 15 MG 24 hr capsule Take 1 capsule (15 mg total) by mouth every morning Max Daily Amount: 15 mg (Patient not taking: Reported on 9/30/2024) 30 capsule 0     No current facility-administered medications on file prior to visit.      Allergies   Allergen Reactions   • Mupirocin      tongue swollen   • Iv Contrast [Iodinated Contrast Media] Hives     Patient developed a few hives on the face/neck after IV contrast. No other associated symptoms.    • Sulfa Antibiotics Rash          The following portions of the patient's history were reviewed and updated as appropriate: allergies, current medications, past family history, past medical history, past social history, past surgical history, and problem list.  PHQ-A Screening            Past Medical History:   Diagnosis Date   • Anxiety    • Bilateral ovarian cysts    • Kidney stones      Past Surgical History:    Procedure Laterality Date   • ADENOIDECTOMY     • TONSILECTOMY AND ADNOIDECTOMY     • TONSILLECTOMY     • TYMPANOSTOMY     • TYMPANOSTOMY TUBE PLACEMENT      6 sets - last time as a child   • WISDOM TOOTH EXTRACTION       Family History   Problem Relation Age of Onset   • Fibroids Mother    • GI problems Father    • Seizures Sister    • Arthritis Maternal Grandmother        Social History   Social Determinants of Health     Tobacco Use: Low Risk  (10/22/2024)    Patient History    • Smoking Tobacco Use: Never    • Smokeless Tobacco Use: Never    • Passive Exposure: Never   Alcohol Use: Not At Risk (6/12/2021)    AUDIT-C    • Frequency of Alcohol Consumption: 2-4 times a month    • Average Number of Drinks: 1 or 2    • Frequency of Binge Drinking: Never   Financial Resource Strain: Not on file   Food Insecurity: Not on file   Transportation Needs: Not on file   Physical Activity: Not on file   Stress: Not on file   Social Connections: Not on file   Intimate Partner Violence: Not on file   Depression: Not at risk (10/10/2024)    PHQ-2    • PHQ-2 Score: 0   Housing Stability: Not on file   Utilities: Not on file   Health Literacy: Not on file      Social History     Substance and Sexual Activity   Alcohol Use Yes    Comment: socially      Social History     Substance and Sexual Activity   Drug Use No     Social History     Tobacco Use   Smoking Status Never   • Passive exposure: Never   Smokeless Tobacco Never           Physical Exam   Physical Exam   Ortho Exam    General:   No apparent distress:  yes    Psych:   AAOX3: yes   Mood and Affect:  Normal    HEENT:   PEERLA: yes   EOM pain: No   EOM nystagmus: No    Neuro:   Convergence:  Normal   25 cm   Finger to nose:  Normal   Dysdiadokinesia: No   Examination of Coordination:  Normal   CNII - XII Intact:  yes    Vestibular Ocular:    Gaze stability: Abnormal:   Nystagmus with verticle motion, Nystagmus with horizontal motion, Dizziness with verticle motion, and  Dizziness with horizontal motion    Modified Balance Error Scoring System (M-KORY) 10 seconds each.  Single leg stance:  3 error(s).  Tandem stance:  0 error(s).    Cervical spine:  Mid-line tenderness: No  Tinel's positive over greater occipital nerve: No  ROM full: yes  Strength UE: Normal  Reflexes:     Symmetric bilateral patellar tendon: Normal             ImPACT Neurocognitive Test Interpretation:     >30 min was spent evaluating the patient.    Amos Duval MD

## 2024-10-25 ENCOUNTER — APPOINTMENT (OUTPATIENT)
Dept: PHYSICAL THERAPY | Facility: CLINIC | Age: 24
End: 2024-10-25
Payer: COMMERCIAL

## 2024-10-30 ENCOUNTER — TELEPHONE (OUTPATIENT)
Age: 24
End: 2024-10-30

## 2024-10-30 ENCOUNTER — OFFICE VISIT (OUTPATIENT)
Dept: CARDIOLOGY CLINIC | Facility: CLINIC | Age: 24
End: 2024-10-30
Payer: COMMERCIAL

## 2024-10-30 VITALS
DIASTOLIC BLOOD PRESSURE: 82 MMHG | SYSTOLIC BLOOD PRESSURE: 128 MMHG | BODY MASS INDEX: 24.11 KG/M2 | HEIGHT: 66 IN | HEART RATE: 91 BPM | WEIGHT: 150 LBS | OXYGEN SATURATION: 99 %

## 2024-10-30 DIAGNOSIS — R94.31 SHORTENED PR INTERVAL: ICD-10-CM

## 2024-10-30 DIAGNOSIS — F07.81 POST CONCUSSION SYNDROME: ICD-10-CM

## 2024-10-30 DIAGNOSIS — R55 SYNCOPE, UNSPECIFIED SYNCOPE TYPE: Primary | ICD-10-CM

## 2024-10-30 DIAGNOSIS — Z86.16 HISTORY OF COVID-19: ICD-10-CM

## 2024-10-30 PROBLEM — K76.82 HE (HEPATIC ENCEPHALOPATHY) (HCC): Status: RESOLVED | Noted: 2024-10-30 | Resolved: 2024-10-30

## 2024-10-30 PROBLEM — K76.82 HE (HEPATIC ENCEPHALOPATHY) (HCC): Status: ACTIVE | Noted: 2024-10-30

## 2024-10-30 PROCEDURE — 93000 ELECTROCARDIOGRAM COMPLETE: CPT | Performed by: INTERNAL MEDICINE

## 2024-10-30 PROCEDURE — 99204 OFFICE O/P NEW MOD 45 MIN: CPT | Performed by: INTERNAL MEDICINE

## 2024-10-30 NOTE — PROGRESS NOTES
Patient ID: Sheila Oakes is a 23 y.o. female.        Plan:      Assessment & Plan  Shortened TX interval  -ECG with shortened TX interval  -Will have patient undergo exercise stress ECG to monitor for any exercise-induced arrhythmias  Syncope, unspecified syncope type  -Possibly vasovagal  -Counseled on dietary and lifestyle modifications including adequate hydration and situational avoidance  -Will have patient have a transthoracic echocardiogram in 1 month ambulatory event monitor for further evaluation with additional recommendations pending results  History of COVID-19  -Last episode July 2024  -Patient denies significant symptoms at this time  -Continue to monitor  Post concussion syndrome  -Patient following with concussion clinic and will be seeing neurology  -Can undergo exercise stress ECG once cleared by them to exercise on treadmill  -Continue to monitor      Follow up Plan/Other summary comments:  -Orthostatic vital signs performed in the office today shows laying blood pressure 130/80 with , seated blood pressure 132/76 with heart rate 100 bpm and standing blood pressure  -Patient denies any symptoms of change in position.130/80 with heart rate 100 bpm  -Will have patient undergo 1 month ambulatory event monitor along with transthoracic echocardiogram with additional recommendations pending results  -Will have patient undergo exercise stress ECG for monitoring for any potential exercise-induced arrhythmias.  -Patient counseled on dietary and lifestyle modifications including adequate hydration and electrolyte repletion and situational avoidance along with leg strengthening exercises.  -Will see patient in 3 months or sooner if necessary once testing is completed  -Patient counseled if she were to have any warning or alarm type symptoms she is to seek emergency medical care immediately.    HPI:   -Patient is a 23-year-old female with seasonal allergies, documented ADHD,'s anxiety disorder,  history of COVID-19 who was assisting her friend with getting animal veterinary clinic when she did have an episode of syncope.  Patient notes that she felt warm sensation come over her body and tunnel vision prior to losing consciousness.  She states that when she woke up had cold sweats present and denies any tongue biting, loss of bowel or bladder.  She notes that after receiving IV fluids in emergency department she felt much better and has not had any significant recurrence of this since.  She does state that she had woke up that morning and not had anything to eat or drink throughout the day when this happened.  She notes that since better hydrating she has not had any significant issues.  She does note having this happen 1 other time when she was younger when getting blood with exact same scenario of warm sensation coming over her body.  -Prior to this event patient was having significant GI losses with diarrhea due to recent antibiotic use for ear infection and does have issues with chronic ear infections.  -Currently in the office today she denies any chest pain, palpitations, lightness or dizziness, loss of consciousness, shortness of breath, lower extremity edema, orthopnea bendopnea.  She notes that there was some concern for postconcussive syndrome after the event and she does still have some issues with bright lights and intermittent double vision but is following up with concussion clinic for this.  She notes she has been doing much better with dietary and lifestyle modifications along with adequate hydration which she notes has helped.  -She denies any chest pain or other symptoms prior to event.  -Patient denies any tobacco use, has occasional social alcohol use, and denies illicit drug use.  -Patient denies any known family history of heart disease.    Most recent or relevant cardiac/vascular testing:    -ECG performed in the office today shows sinus rhythm with short CO interval heart rate 87  "bpm      Past Surgical History:   Procedure Laterality Date    ADENOIDECTOMY      TONSILECTOMY AND ADNOIDECTOMY      TONSILLECTOMY      TYMPANOSTOMY      TYMPANOSTOMY TUBE PLACEMENT      6 sets - last time as a child    WISDOM TOOTH EXTRACTION           Review of Systems   Review of Systems   Constitutional:  Negative for chills, diaphoresis, fatigue and fever.   HENT:  Negative for trouble swallowing and voice change.    Eyes:  Negative for pain and redness.   Respiratory:  Negative for shortness of breath and wheezing.    Cardiovascular:  Negative for chest pain, palpitations and leg swelling.   Gastrointestinal:  Negative for abdominal pain, constipation, diarrhea, nausea and vomiting.   Genitourinary:  Negative for dysuria.   Musculoskeletal:  Negative for neck pain and neck stiffness.   Skin:  Negative for rash.   Neurological:  Negative for dizziness, syncope, light-headedness and headaches.   Psychiatric/Behavioral:  Negative for agitation and confusion.    All other systems reviewed and are negative.         Objective:     /82   Pulse 91   Ht 5' 6\" (1.676 m)   Wt 68 kg (150 lb)   SpO2 99%   BMI 24.21 kg/m²     PHYSICAL EXAM:  Physical Exam  Vitals reviewed.   Constitutional:       General: She is not in acute distress.     Appearance: Normal appearance. She is not diaphoretic.   HENT:      Head: Normocephalic and atraumatic.   Eyes:      General:         Right eye: No discharge.         Left eye: No discharge.   Neck:      Comments: Trachea midline, no significant JVD appreciated  Cardiovascular:      Rate and Rhythm: Normal rate and regular rhythm.      Heart sounds:      No friction rub.   Pulmonary:      Effort: Pulmonary effort is normal. No respiratory distress.      Breath sounds: No wheezing.   Chest:      Chest wall: No tenderness.   Abdominal:      General: Bowel sounds are normal.      Palpations: Abdomen is soft.      Tenderness: There is no abdominal tenderness. There is no rebound. "   Musculoskeletal:      Right lower leg: No edema.      Left lower leg: No edema.   Skin:     General: Skin is warm and dry.   Neurological:      Mental Status: She is alert.      Comments: Awake, alert, able to answer questions appropriately, able to move extremities bilaterally.   Psychiatric:         Mood and Affect: Mood normal.         Behavior: Behavior normal.          Meds reviewed.  Current Outpatient Medications on File Prior to Visit   Medication Sig Dispense Refill    albuterol (ProAir HFA) 90 mcg/act inhaler Inhale 2 puffs every 4 (four) hours as needed for wheezing or shortness of breath 8.5 g 1    dicyclomine (BENTYL) 20 mg tablet Take 1 tablet (20 mg total) by mouth every 6 (six) hours as needed (abdominal cramping and diarrhea) 30 tablet 1    fexofenadine (ALLEGRA) 180 MG tablet Take 180 mg by mouth as needed      lactase (LACTAID) 3,000 units tablet Take 1 tablet (3,000 Units total) by mouth 3 (three) times a day with meals 90 tablet 0    meclizine (ANTIVERT) 25 mg tablet Take 1 tablet (25 mg total) by mouth 3 (three) times a day as needed for dizziness 30 tablet 0    norgestimate-ethinyl estradiol (TriNessa, 28,) 0.18/0.215/0.25 MG-35 MCG per tablet Take 1 tablet by mouth daily 84 tablet 3    ondansetron (ZOFRAN) 4 mg tablet Take 1 tablet (4 mg total) by mouth every 8 (eight) hours as needed for nausea or vomiting 20 tablet 0    amphetamine-dextroamphetamine (ADDERALL XR, 15MG,) 15 MG 24 hr capsule Take 1 capsule (15 mg total) by mouth every morning Max Daily Amount: 15 mg (Patient not taking: Reported on 9/30/2024) 30 capsule 0     No current facility-administered medications on file prior to visit.      Past Medical History:   Diagnosis Date    Anxiety     Bilateral ovarian cysts     Kidney stones            Social History     Tobacco Use   Smoking Status Never    Passive exposure: Never   Smokeless Tobacco Never     Family History   Problem Relation Age of Onset    Fibroids Mother     GI  problems Father     Seizures Sister     Arthritis Maternal Grandmother

## 2024-10-30 NOTE — ASSESSMENT & PLAN NOTE
-Possibly vasovagal  -Counseled on dietary and lifestyle modifications including adequate hydration and situational avoidance  -Will have patient have a transthoracic echocardiogram in 1 month ambulatory event monitor for further evaluation with additional recommendations pending results

## 2024-10-30 NOTE — ASSESSMENT & PLAN NOTE
-Patient following with concussion clinic and will be seeing neurology  -Can undergo exercise stress ECG once cleared by them to exercise on treadmill  -Continue to monitor

## 2024-10-30 NOTE — TELEPHONE ENCOUNTER
Caller: Patient     Doctor: Mukund     Reason for call: Asked for a new letter stating the next appointment   Please send to [employer]     Call back#: [phone]

## 2024-11-01 ENCOUNTER — CONSULT (OUTPATIENT)
Dept: NEUROLOGY | Facility: CLINIC | Age: 24
End: 2024-11-01
Payer: COMMERCIAL

## 2024-11-01 VITALS
DIASTOLIC BLOOD PRESSURE: 70 MMHG | HEIGHT: 66 IN | OXYGEN SATURATION: 99 % | TEMPERATURE: 97.8 F | HEART RATE: 90 BPM | WEIGHT: 151.6 LBS | BODY MASS INDEX: 24.36 KG/M2 | SYSTOLIC BLOOD PRESSURE: 119 MMHG

## 2024-11-01 DIAGNOSIS — F07.81 POST CONCUSSIVE SYNDROME: ICD-10-CM

## 2024-11-01 DIAGNOSIS — R55 CONVULSIVE SYNCOPE: Primary | ICD-10-CM

## 2024-11-01 PROCEDURE — 99205 OFFICE O/P NEW HI 60 MIN: CPT | Performed by: NURSE PRACTITIONER

## 2024-11-01 NOTE — PROGRESS NOTES
Neurology Ambulatory Visit  Name: Sheila Oakes       : 2000       MRN: [id number]   Encounter Provider: PABLITO Andrade   Encounter Date: 2024  Encounter department: [facility]      Assessment & Plan  Post concussive syndrome  Continue follow-up with concussion clinic.  Convulsive syncope  23 y.o.  right handed female, with ADHD and anxiety disorder who is presenting to [facility] for evaluation of a possible seizure.  Neurological exam is normal.  Head CT was unremarkable. Given the prodromal feelings of vision closing in, dizziness, pale, and feeling like she was going to pass out, this event is most consistent with a syncopal episode, specifically convulsive syncope.  She should continue follow-up with Cardiology and obtain the previously ordered stress test, event recorder and echocardiogram.  Consider longer-term ECG monitoring if initial workup is unrevealing.  If events continue with a negative Cardiology evaluation, would then proceed with routine EEG and MRI of her brain to rule out seizures.    PLAN:   Continue follow-up with Cardiology   2.   Call office if you experience any further seizure-like events.   3.   Seizure/Syncope safety and First Aid were reviewed  4.   Consider long term ECG monitoring if events continue.    Follow-up: 3 months         History of Present Illness   Sheila Oakes is a 23 y.o.  right handed female, with ADHD and anxiety disorder who is presenting to [facility] for evaluation of a syncopal episode.      Patient was at a  office on 24 with her friend. She was sitting in a chair alongside of her friend, when she experienced visual changes of seeming like the door was coming towards her. She felt like she was going to pass out, was dizzy,  pale, then passed out, dropping her phone. She did hit her head on the wall sustaining a concussion. She woke up drenched in sweat with numbness  and tingling in her feet. Patient estimates that she was unresponsive for about 45 seconds to 1 minute. Her friend did report whole body shaking during the episode. No tongue biting or incontinence. No post ictal phase or confusion. No immediate precipitating factors were identified. However, she did have some diarrhea the previous week and had not eaten that morning.     There is one other instance where she passed out. This occurred about 6-7 years ago when she was having labs drawn. No history of seizures.     She is being followed by Ortho Concussion clinic and will be starting OT for her continued vision difficulties.Patient continues to experience headaches and light sensitivity, but has returned to work (works remotely).      She is also seeing Cardiology where a stress test, event recorder and TTE are pending. EKGs have shown shortened CO interval.        Current Antiepileptic Medications:  1. none  Other medications as per Epic      Event/Seizure Semiology:  1.  Swiss like passing out, dizzy, pale, lost consciousness, drenched in sweat, lasted <1 min. No tongue biting, incontinence or post ictal period.     Special Features:  Status epilepticus: none  Self Injury Seizures: none  Precipitating Factors: none      Epilepsy Risk Factors:   Abnormal pregnancy: no     Abnormal birth/: no    Abnormal Development: no    Febrile seizures, simple: no    Febrile seizures, complex: no    CNS infection: no    Intellectual disability: no     Cerebral palsy:  no    Head injury (moderate/severe): no  CNS neoplasm: no     CNS malformation: no     Neurosurgical procedure: no    Stroke:  no     Alcohol abuse: no    Drug abuse: no     Family history Sz/epilepsy: sister has seizures  Prior physical/sexual/emotional abuse: no     Prior AEDs:  none    Prior Evaluation:  -HCT 24- No acute intracranial hemorrhage, mass effect or midline shift    Psychiatric History:  Depression: no  Anxiety: yes  Psychosis:  "no  Psychiatric Admissions: no    Medical History:  ADHD   Anxiety       Social History:  Driving: yes  Working: remotely for [employer]-     I reviewed Allergies, Medical History, Surgical History and Family History as documented in Epic/Care Everywhere      Review of Systems:  Constitutional:  Negative for appetite change, fatigue and fever.   HENT: Negative for hearing loss, tinnitus, trouble swallowing and voice change.    Eyes: Negative for photophobia, pain and visual disturbance.   Respiratory: Negative for shortness of breath.    Cardiovascular: Negative for palpitations.   Gastrointestinal: Negative for nausea and vomiting.   Endocrine: Negative for cold intolerance.   Genitourinary: Negative for dysuria, frequency and urgency.   Musculoskeletal:  Negative for back pain, gait problem, myalgias, neck pain and neck stiffness.   Skin: Negative for rash.   Allergic/Immunologic: Negative for hives.  Neurological: Negative for dizziness, tremors, syncope, speech difficulty, weakness, light-headedness, numbness and headaches.   Hematological: Negative for easy bruising and bleeding  Psychiatric/Behavioral: Negative for confusion, hallucinations and sleep disturbance.         Objective   /70 (BP Location: Left arm, Patient Position: Sitting, Cuff Size: Adult)   Pulse 90   Temp 97.8 °F (36.6 °C) (Temporal)   Ht 5' 6\" (1.676 m)   Wt 68.8 kg (151 lb 9.6 oz)   SpO2 99%   BMI 24.47 kg/m²      GENERAL EXAMINATION:   In general patient is well appearing and in no distress.    NEUROLOGIC EXAMINATION:     Alert and oriented to person, date, location. Fund of knowledge is full. Recent and remote memory were intact  Mood and affect are appropriate. Attention is intact.  Language function including fluency, naming, and comprehension intact.    Cranial nerves: Pupils are equal round reactive to light and accommodation. Visual Fields are full to confrontation bilaterally. Extraocular movements " are intact. Positive nystagmus. Facial sensation is intact to light touch. No facial droop, face activates symmetrically. There is no dysarthria. Hearing was intact to finger rub. Tongue and uvula are midline and palate elevates symmetrically. Shoulder shrug  5/5.    Motor Exam:  No pronator drift. Bulk and tone are normal. Strength is 5/5 throughout.    Deep tendon reflexes: Biceps 2+, brachioradialis 2+, patellar 2+, Achilles 2+ bilaterally.     Sensation: Intact to light touch in all four extremities    Coordination: Finger nose finger intact    Gait: Negative romberg. Normal casual gait. Able to walk on heels and toes and tandem walk without difficulty.    Administrative Statements     I spent a total of 70 min with the patient and completing documentation on the day of the encounter. This time was spent specifically discussing the patient's diagnosis, and plan as detailed above.    Voice recognition software was used in the generation of this note. There may be unintentional errors including grammatical errors, spelling errors, or pronoun errors.

## 2024-11-01 NOTE — PROGRESS NOTES
Review of Systems   Constitutional:  Negative for appetite change, fatigue and fever.   HENT: Negative.  Negative for hearing loss, tinnitus, trouble swallowing and voice change.    Eyes: Negative.  Negative for photophobia, pain and visual disturbance.   Respiratory: Negative.  Negative for shortness of breath.    Cardiovascular: Negative.  Negative for palpitations.   Gastrointestinal: Negative.  Negative for nausea and vomiting.   Endocrine: Negative.  Negative for cold intolerance.   Genitourinary: Negative.  Negative for dysuria, frequency and urgency.   Musculoskeletal:  Negative for back pain, gait problem, myalgias, neck pain and neck stiffness.   Skin: Negative.  Negative for rash.   Allergic/Immunologic: Negative.    Neurological: Negative.  Negative for dizziness, tremors, seizures, syncope, facial asymmetry, speech difficulty, weakness, light-headedness, numbness and headaches.        Vet sept 27th - sitting down - door zoomed in and than she passed out - smacked head off wall   Hematological: Negative.  Does not bruise/bleed easily.   Psychiatric/Behavioral: Negative.  Negative for confusion, hallucinations and sleep disturbance.    All other systems reviewed and are negative.

## 2024-11-01 NOTE — PATIENT INSTRUCTIONS
Continue follow-up with Cardiology.    Continue follow-up with concussion clinic.    Notify office if you experience any seizure-like activity.

## 2024-11-04 ENCOUNTER — TELEPHONE (OUTPATIENT)
Age: 24
End: 2024-11-04

## 2024-11-04 DIAGNOSIS — S06.0X0D CONCUSSION WITHOUT LOSS OF CONSCIOUSNESS, SUBSEQUENT ENCOUNTER: Primary | ICD-10-CM

## 2024-11-04 DIAGNOSIS — G44.319 ACUTE POST-TRAUMATIC HEADACHE, NOT INTRACTABLE: ICD-10-CM

## 2024-11-04 DIAGNOSIS — R42 DIZZINESS: ICD-10-CM

## 2024-11-04 DIAGNOSIS — R41.840 DIFFICULTY CONCENTRATING: ICD-10-CM

## 2024-11-04 DIAGNOSIS — H53.9 VISION DISORDER: ICD-10-CM

## 2024-11-04 NOTE — TELEPHONE ENCOUNTER
Caller: [facility]    Doctor: Mukund    Reason for call: [facility] is calling to request an updated script for occupation therapy since most of the patients symptoms are vision related. Please fax to [phone]    Call back#: [phone]

## 2024-11-05 ENCOUNTER — OFFICE VISIT (OUTPATIENT)
Dept: OBGYN CLINIC | Facility: CLINIC | Age: 24
End: 2024-11-05
Payer: COMMERCIAL

## 2024-11-05 VITALS
BODY MASS INDEX: 24.49 KG/M2 | HEIGHT: 66 IN | SYSTOLIC BLOOD PRESSURE: 130 MMHG | WEIGHT: 152.4 LBS | HEART RATE: 99 BPM | DIASTOLIC BLOOD PRESSURE: 83 MMHG | TEMPERATURE: 99 F

## 2024-11-05 DIAGNOSIS — R42 DIZZINESS: ICD-10-CM

## 2024-11-05 DIAGNOSIS — R41.840 DIFFICULTY CONCENTRATING: ICD-10-CM

## 2024-11-05 DIAGNOSIS — H53.9 VISION DISORDER: ICD-10-CM

## 2024-11-05 DIAGNOSIS — S06.0X0D CONCUSSION WITHOUT LOSS OF CONSCIOUSNESS, SUBSEQUENT ENCOUNTER: Primary | ICD-10-CM

## 2024-11-05 DIAGNOSIS — G44.319 ACUTE POST-TRAUMATIC HEADACHE, NOT INTRACTABLE: ICD-10-CM

## 2024-11-05 PROCEDURE — 99214 OFFICE O/P EST MOD 30 MIN: CPT | Performed by: FAMILY MEDICINE

## 2024-11-05 NOTE — LETTER
November 7, 2024     Patient: Sheila Oakes  YOB: 2000  Date of Visit: 11/5/2024      To Whom it May Concern:    Sheila Oakes is under my professional care. Sheila was seen in my office on 11/5/2024. Sheila may return to work with limitations -  please allow Sheila to work from home due to concussion.  Please allow frequent breaks as needed.  Her next appt is 11/29/24 .    If you have any questions or concerns, please don't hesitate to call.         Sincerely,          Amos Duval MD        CC: No Recipients

## 2024-11-05 NOTE — LETTER
November 7, 2024     Patient: Sheila Oakes  YOB: 2000  Date of Visit: 11/5/2024      To Whom it May Concern:    Sheila Oakes is under my professional care. Sheila was seen in my office on 11/5/2024. Sheila may return to work with limitations - please allow Sheila to work from home due to concussion.  Please allow frequent breaks as needed.  Her next appt is 11/29/24 . .    If you have any questions or concerns, please don't hesitate to call.         Sincerely,          Amos Duval MD        CC: No Recipients

## 2024-11-05 NOTE — PATIENT INSTRUCTIONS
Follow up 3 wk  Begin OT  Continue screen time  as tolerated.  Tylenol/motrin as needed sparingly.  May begin excedrin or Naproxen 2 tabs 2 x daily as needed.  Continue exercising- walking, jogging, stationary bike for 30 min daily as tolerated.  No contact sports or weight training.

## 2024-11-05 NOTE — PROGRESS NOTES
Assessment & Plan      Assessment:    1. Concussion without loss of consciousness, subsequent encounter  2. Dizziness  3. Vision disorder  4. Acute post-traumatic headache, not intractable  5. Difficulty concentrating        Plan:    Patient Instructions   Follow up 3 wk  Begin OT  Continue screen time  as tolerated.  Tylenol/motrin as needed sparingly.  May begin excedrin or Naproxen 2 tabs 2 x daily as needed.  Continue exercising- walking, jogging, stationary bike for 30 min daily as tolerated.  No contact sports or weight training.         Return in about 3 weeks (around 11/26/2024) for Recheck.      Subjective        Chief Complaint:   Concussion (Follow up)      [facility]    School: n/a  Related to: fainted and hit head    School Status:     Sheila Oakes is a 23 y.o. female who presents today for follow up of concussion    Getting better- symptoms less severe but still having issues  Exercising- caused some issues  Concentration- issues.  Working from home.  Ibuprofen/tylenol didn't help much  Tried excedrin- helped a little.  Started PT/going to start OT.      LThe current concussion symptom score is 9/22 including headache, visual problem, dizziness, sensitivity to light, sensitivity to noise, foggy, slowed down, difficulty concentrating, and difficulty remembering  Do symptoms worsen with Physical Activity?  Yes dizziness  Do symptoms worsen with Cognitive Activity?  Yes HA  Overall Rating:  What percent is this person back to normal?  Patient 60 %    Review of Systems   Constitutional:  Negative for fatigue and fever.   Respiratory:  Negative for shortness of breath.    Cardiovascular:  Negative for chest pain.   Gastrointestinal:  Negative for abdominal pain and nausea.   Genitourinary:  Negative for dysuria.   Skin:  Negative for rash and wound.   Neurological:  Positive for dizziness and headaches. Negative for weakness.   Psychiatric/Behavioral:  Positive for decreased concentration.      Symptoms  "Checklist      Flowsheet Row Most Recent Value   Physical    Headache 1   Nausea 0   Vomiting 0   Balance problems 0   Dizziness 1   Visual problems 1   Fatigue 0   Sensitivity to light 1   Sensitivity to noise 1   Numbness / tingling 0   TOTAL PHYSICAL SCORE 5   Cognitive    Foggy 1   Slowed down 1   Difficulty concentrating 1   Difficulty remembering 1   TOTAL COGNITIVE SCORE 4   Emotional    Irritability 0   Sadness 0   More emotional 0   Nervousness 0   TOTAL EMOTIONAL SCORE 0   Sleep    Drowsiness 0   Sleeping less 0   Sleeping more 0   Difficulty falling asleep 0   TOTAL SLEEP SCORE 0   TOTAL SYMPTOM SCORE 9          Symptoms Checklist      Flowsheet Row Most Recent Value   Physical    Headache 1   Nausea 0   Vomiting 0   Balance problems 0   Dizziness 1   Visual problems 1   Fatigue 0   Sensitivity to light 1   Sensitivity to noise 1   Numbness / tingling 0   TOTAL PHYSICAL SCORE 5   Cognitive    Foggy 1   Slowed down 1   Difficulty concentrating 1   Difficulty remembering 1   TOTAL COGNITIVE SCORE 4   Emotional    Irritability 0   Sadness 0   More emotional 0   Nervousness 0   TOTAL EMOTIONAL SCORE 0   Sleep    Drowsiness 0   Sleeping less 0   Sleeping more 0   Difficulty falling asleep 0   TOTAL SLEEP SCORE 0   TOTAL SYMPTOM SCORE 9                 Objective      /83 (BP Location: Left arm, Patient Position: Sitting, Cuff Size: Standard)   Pulse 99   Temp 99 °F (37.2 °C) (Tympanic)   Ht 5' 6\" (1.676 m)   Wt 69.1 kg (152 lb 6.4 oz)   BMI 24.60 kg/m²   Body mass index is 24.6 kg/m².   Patient Active Problem List   Diagnosis    Allergic rhinitis due to pollen    History of recurrent ear infection    CIARA (generalized anxiety disorder)    Annual physical exam    Adult ADHD    Neuralgia, geniculate    Chest tightness    Labyrinthitis of right ear    History of CT scan of head    Generalized abdominal pain    Other headache syndrome    Vitamin D deficiency    B12 deficiency    Other fatigue    Irritable " bowel syndrome with diarrhea    COVID-19 long hauler    Syncope    History of COVID-19    Post concussion syndrome        Meds/Allergies   Current Outpatient Medications on File Prior to Visit   Medication Sig Dispense Refill    albuterol (ProAir HFA) 90 mcg/act inhaler Inhale 2 puffs every 4 (four) hours as needed for wheezing or shortness of breath 8.5 g 1    dicyclomine (BENTYL) 20 mg tablet Take 1 tablet (20 mg total) by mouth every 6 (six) hours as needed (abdominal cramping and diarrhea) 30 tablet 1    fexofenadine (ALLEGRA) 180 MG tablet Take 180 mg by mouth as needed      lactase (LACTAID) 3,000 units tablet Take 1 tablet (3,000 Units total) by mouth 3 (three) times a day with meals 90 tablet 0    meclizine (ANTIVERT) 25 mg tablet Take 1 tablet (25 mg total) by mouth 3 (three) times a day as needed for dizziness 30 tablet 0    norgestimate-ethinyl estradiol (TriNessa, 28,) 0.18/0.215/0.25 MG-35 MCG per tablet Take 1 tablet by mouth daily 84 tablet 3    ondansetron (ZOFRAN) 4 mg tablet Take 1 tablet (4 mg total) by mouth every 8 (eight) hours as needed for nausea or vomiting 20 tablet 0    amphetamine-dextroamphetamine (ADDERALL XR, 15MG,) 15 MG 24 hr capsule Take 1 capsule (15 mg total) by mouth every morning Max Daily Amount: 15 mg (Patient not taking: Reported on 9/30/2024) 30 capsule 0     No current facility-administered medications on file prior to visit.      Allergies   Allergen Reactions    Mupirocin      tongue swollen    Iv Contrast [Iodinated Contrast Media] Hives     Patient developed a few hives on the face/neck after IV contrast. No other associated symptoms.     Sulfa Antibiotics Rash          The following portions of the patient's history were reviewed and updated as appropriate: allergies, current medications, past family history, past medical history, past social history, past surgical history, and problem list.  PHQ-A Screening            Past Medical History:   Diagnosis Date    Anxiety      Bilateral ovarian cysts     Kidney stones      Past Surgical History:   Procedure Laterality Date    ADENOIDECTOMY      TONSILECTOMY AND ADNOIDECTOMY      TONSILLECTOMY      TYMPANOSTOMY      TYMPANOSTOMY TUBE PLACEMENT      6 sets - last time as a child    WISDOM TOOTH EXTRACTION       Family History   Problem Relation Age of Onset    Fibroids Mother     GI problems Father     Seizures Sister     Arthritis Maternal Grandmother        Social History   Social Determinants of Health     Tobacco Use: Low Risk  (11/5/2024)    Patient History     Smoking Tobacco Use: Never     Smokeless Tobacco Use: Never     Passive Exposure: Never   Alcohol Use: Not At Risk (6/12/2021)    AUDIT-C     Frequency of Alcohol Consumption: 2-4 times a month     Average Number of Drinks: 1 or 2     Frequency of Binge Drinking: Never   Financial Resource Strain: Not on file   Food Insecurity: Not on file   Transportation Needs: Not on file   Physical Activity: Not on file   Stress: Not on file   Social Connections: Not on file   Intimate Partner Violence: Not on file   Depression: Not at risk (10/10/2024)    PHQ-2     PHQ-2 Score: 0   Housing Stability: Not on file   Utilities: Not on file   Health Literacy: Not on file      Social History     Substance and Sexual Activity   Alcohol Use Yes    Comment: socially      Social History     Substance and Sexual Activity   Drug Use No     Social History     Tobacco Use   Smoking Status Never    Passive exposure: Never   Smokeless Tobacco Never           Physical Exam   Physical Exam  Constitutional:       Appearance: Normal appearance.   HENT:      Head: Normocephalic.   Pulmonary:      Effort: Pulmonary effort is normal.   Musculoskeletal:         General: Normal range of motion.      Cervical back: Normal range of motion.   Skin:     General: Skin is warm.   Neurological:      General: No focal deficit present.      Mental Status: She is alert. Mental status is at baseline. She is disoriented.    Psychiatric:         Mood and Affect: Mood normal.        Ortho Exam    General:   No apparent distress:  yes    Psych:   AAOX3: yes   Mood and Affect:  Normal    HEENT:   PEERLA: yes   EOM pain: No   EOM nystagmus: No    Neuro:   Convergence:  Normal 32  cm   Finger to nose:  Normal   Dysdiadokinesia: No   Examination of Coordination:  Normal   CNII - XII Intact:  yes    Vestibular Ocular:    Gaze stability: Abnormal:   Nystagmus with verticle motion, Dizziness with verticle motion, and Dizziness with horizontal motion    Modified Balance Error Scoring System (M-KORY) 10 seconds each.  Single leg stance:  2 error(s).  Tandem stance:  0 error(s).    Cervical spine:  Mid-line tenderness: No  Tinel's positive over greater occipital nerve: No  ROM full: yes  Strength UE: Normal  Reflexes:     Symmetric bilateral patellar tendon: Normal             ImPACT Neurocognitive Test Interpretation:         >30 min was spent evaluating the patient.    Amos Duval MD

## 2024-11-05 NOTE — LETTER
November 5, 2024     Patient: Sheila Oakes  YOB: 2000  Date of Visit: 11/5/2024      To Whom it May Concern:    Sheila Oakes is under my professional care. Sheila was seen in my office on 11/5/2024. Sheila may return to work with limitations -  please allow Sheila to work from home due to concussion.  Please allow frequent breaks as needed .    If you have any questions or concerns, please don't hesitate to call.         Sincerely,          Amos Duval MD        CC: No Recipients

## 2024-11-06 ENCOUNTER — TELEPHONE (OUTPATIENT)
Age: 24
End: 2024-11-06

## 2024-11-06 NOTE — TELEPHONE ENCOUNTER
Caller: patient    Doctor: Mukund    Reason for call: Patient is requesting a new note to be put into [employer], patient would like to add the date of her next appointment to the letter     Call back#: [phone]

## 2024-11-30 ENCOUNTER — OFFICE VISIT (OUTPATIENT)
Dept: OBGYN CLINIC | Facility: CLINIC | Age: 24
End: 2024-11-30
Payer: COMMERCIAL

## 2024-11-30 VITALS
SYSTOLIC BLOOD PRESSURE: 106 MMHG | HEART RATE: 97 BPM | TEMPERATURE: 99.6 F | BODY MASS INDEX: 25.39 KG/M2 | DIASTOLIC BLOOD PRESSURE: 67 MMHG | WEIGHT: 158 LBS | HEIGHT: 66 IN

## 2024-11-30 DIAGNOSIS — G44.319 ACUTE POST-TRAUMATIC HEADACHE, NOT INTRACTABLE: ICD-10-CM

## 2024-11-30 DIAGNOSIS — H53.9 VISION DISORDER: ICD-10-CM

## 2024-11-30 DIAGNOSIS — R42 DIZZINESS: ICD-10-CM

## 2024-11-30 DIAGNOSIS — R41.840 DIFFICULTY CONCENTRATING: ICD-10-CM

## 2024-11-30 DIAGNOSIS — S06.0X0D CONCUSSION WITHOUT LOSS OF CONSCIOUSNESS, SUBSEQUENT ENCOUNTER: Primary | ICD-10-CM

## 2024-11-30 PROCEDURE — 99214 OFFICE O/P EST MOD 30 MIN: CPT | Performed by: FAMILY MEDICINE

## 2024-11-30 NOTE — PATIENT INSTRUCTIONS
F/u 3 wks  Continue OT due to vision issues.  Continue screen time as tolerated.  Tylenol/motrin as needed sparingly.  Continue exercising

## 2024-11-30 NOTE — PROGRESS NOTES
Assessment & Plan      Assessment:    1. Concussion without loss of consciousness, subsequent encounter  2. Dizziness  3. Vision disorder  4. Acute post-traumatic headache, not intractable  5. Difficulty concentrating        Plan:    Patient Instructions   F/u 3 wks  Continue OT due to vision issues.  Continue screen time as tolerated.  Tylenol/motrin as needed sparingly.  Continue exercising       Return in about 3 weeks (around 12/21/2024) for Recheck.      Subjective        Chief Complaint:   Concussion (Follow up)      HPI      Related to: fainted and hit head      Sheila Oakes is a 24 y.o. female who presents today for follow up of concussion    Better- no symptoms  Exercising- no issues  Concentration- no issues.  Working from home.  no pain meds  Didn't go to OT last week due to illness      LThe current concussion symptom score is 0/22   Do symptoms worsen with Physical Activity?  No  Do symptoms worsen with Cognitive Activity?  No  Overall Rating:  What percent is this person back to normal?  Patient 100 %    Review of Systems   Constitutional:  Negative for fatigue and fever.   Respiratory:  Negative for shortness of breath.    Cardiovascular:  Negative for chest pain.   Gastrointestinal:  Negative for abdominal pain and nausea.   Genitourinary:  Negative for dysuria.   Skin:  Negative for rash and wound.   Neurological:  Negative for weakness and headaches.     Symptoms Checklist      Flowsheet Row Most Recent Value   Physical    Headache 0   Nausea 0   Vomiting 0   Balance problems 0   Dizziness 0   Visual problems 0   Fatigue 0   Sensitivity to light 0   Sensitivity to noise 0   Numbness / tingling 0   TOTAL PHYSICAL SCORE 0   Cognitive    Foggy 0   Slowed down 0   Difficulty concentrating 0   Difficulty remembering 0   TOTAL COGNITIVE SCORE 0   Emotional    Irritability 0   Sadness 0   More emotional 0   Nervousness 0   TOTAL EMOTIONAL SCORE 0   Sleep    Drowsiness 0   Sleeping less 0   Sleeping  "more 0   Difficulty falling asleep 0   TOTAL SLEEP SCORE 0   TOTAL SYMPTOM SCORE 0          Symptoms Checklist      Flowsheet Row Most Recent Value   Physical    Headache 0   Nausea 0   Vomiting 0   Balance problems 0   Dizziness 0   Visual problems 0   Fatigue 0   Sensitivity to light 0   Sensitivity to noise 0   Numbness / tingling 0   TOTAL PHYSICAL SCORE 0   Cognitive    Foggy 0   Slowed down 0   Difficulty concentrating 0   Difficulty remembering 0   TOTAL COGNITIVE SCORE 0   Emotional    Irritability 0   Sadness 0   More emotional 0   Nervousness 0   TOTAL EMOTIONAL SCORE 0   Sleep    Drowsiness 0   Sleeping less 0   Sleeping more 0   Difficulty falling asleep 0   TOTAL SLEEP SCORE 0   TOTAL SYMPTOM SCORE 0                 Objective      /67 (BP Location: Right arm, Patient Position: Sitting, Cuff Size: Large)   Pulse 97   Temp 99.6 °F (37.6 °C) (Tympanic)   Ht 5' 6\" (1.676 m)   Wt 71.7 kg (158 lb)   BMI 25.50 kg/m²   Body mass index is 25.5 kg/m².   Patient Active Problem List   Diagnosis    Allergic rhinitis due to pollen    History of recurrent ear infection    CIARA (generalized anxiety disorder)    Annual physical exam    Adult ADHD    Neuralgia, geniculate    Chest tightness    Labyrinthitis of right ear    History of CT scan of head    Generalized abdominal pain    Other headache syndrome    Vitamin D deficiency    B12 deficiency    Other fatigue    Irritable bowel syndrome with diarrhea    COVID-19 long hauler    Syncope    History of COVID-19    Post concussion syndrome        Meds/Allergies   Current Outpatient Medications on File Prior to Visit   Medication Sig Dispense Refill    albuterol (ProAir HFA) 90 mcg/act inhaler Inhale 2 puffs every 4 (four) hours as needed for wheezing or shortness of breath 8.5 g 1    dicyclomine (BENTYL) 20 mg tablet Take 1 tablet (20 mg total) by mouth every 6 (six) hours as needed (abdominal cramping and diarrhea) 30 tablet 1    fexofenadine (ALLEGRA) 180 MG " tablet Take 180 mg by mouth as needed      lactase (LACTAID) 3,000 units tablet Take 1 tablet (3,000 Units total) by mouth 3 (three) times a day with meals (Patient taking differently: Take 3,000 Units by mouth if needed) 90 tablet 0    meclizine (ANTIVERT) 25 mg tablet Take 1 tablet (25 mg total) by mouth 3 (three) times a day as needed for dizziness 30 tablet 0    norgestimate-ethinyl estradiol (TriNessa, 28,) 0.18/0.215/0.25 MG-35 MCG per tablet Take 1 tablet by mouth daily 84 tablet 3    ondansetron (ZOFRAN) 4 mg tablet Take 1 tablet (4 mg total) by mouth every 8 (eight) hours as needed for nausea or vomiting 20 tablet 0    amphetamine-dextroamphetamine (ADDERALL XR, 15MG,) 15 MG 24 hr capsule Take 1 capsule (15 mg total) by mouth every morning Max Daily Amount: 15 mg (Patient not taking: Reported on 9/30/2024) 30 capsule 0     No current facility-administered medications on file prior to visit.      Allergies   Allergen Reactions    Mupirocin      tongue swollen    Iv Contrast [Iodinated Contrast Media] Hives     Patient developed a few hives on the face/neck after IV contrast. No other associated symptoms.     Sulfa Antibiotics Rash          The following portions of the patient's history were reviewed and updated as appropriate: allergies, current medications, past family history, past medical history, past social history, past surgical history, and problem list.  PHQ-A Screening            Past Medical History:   Diagnosis Date    Anxiety     Bilateral ovarian cysts     Kidney stones      Past Surgical History:   Procedure Laterality Date    ADENOIDECTOMY      TONSILECTOMY AND ADNOIDECTOMY      TONSILLECTOMY      TYMPANOSTOMY      TYMPANOSTOMY TUBE PLACEMENT      6 sets - last time as a child    WISDOM TOOTH EXTRACTION       Family History   Problem Relation Age of Onset    Fibroids Mother     GI problems Father     Seizures Sister     Arthritis Maternal Grandmother        Social History   Social Drivers of  Health     Tobacco Use: Low Risk  (11/30/2024)    Patient History     Smoking Tobacco Use: Never     Smokeless Tobacco Use: Never     Passive Exposure: Never   Alcohol Use: Not At Risk (6/12/2021)    AUDIT-C     Frequency of Alcohol Consumption: 2-4 times a month     Average Number of Drinks: 1 or 2     Frequency of Binge Drinking: Never   Financial Resource Strain: Not on file   Food Insecurity: Not on file   Transportation Needs: Not on file   Physical Activity: Not on file   Stress: Not on file   Social Connections: Not on file   Intimate Partner Violence: Not on file   Depression: Not at risk (10/10/2024)    PHQ-2     PHQ-2 Score: 0   Housing Stability: Not on file   Utilities: Not on file   Health Literacy: Not on file      Social History     Substance and Sexual Activity   Alcohol Use Yes    Comment: socially      Social History     Substance and Sexual Activity   Drug Use No     Social History     Tobacco Use   Smoking Status Never    Passive exposure: Never   Smokeless Tobacco Never           Physical Exam   Physical Exam   Ortho Exam    General:   No apparent distress:  yes    Psych:   AAOX3: yes   Mood and Affect:  Normal    HEENT:   PEERLA: yes   EOM pain: No   EOM nystagmus: No    Neuro:   Convergence:  Normal  2 cm   Finger to nose:  Normal   Dysdiadokinesia: No   Examination of Coordination:  Normal   CNII - XII Intact:  yes    Vestibular Ocular:    Gaze stability: Abnormal:   Nystagmus with verticle motion, Nystagmus with horizontal motion, and Dizziness with verticle motion    Modified Balance Error Scoring System (M-KORY) 10 seconds each.  Single leg stance:  4 error(s).  Tandem stance:  3 error(s).    Cervical spine:  Mid-line tenderness: No  Tinel's positive over greater occipital nerve: No  ROM full: yes  Strength UE: Normal  Reflexes:     Symmetric bilateral patellar tendon: Normal             ImPACT Neurocognitive Test Interpretation:         Amos Duval MD

## 2024-11-30 NOTE — LETTER
November 30, 2024     Patient: Sheila Oakes  YOB: 2000  Date of Visit: 11/30/2024      To Whom it May Concern:    Sheila Oakes is under my professional care. Sheila was seen in my office on 11/30/2024. Sheila may return to work on 11/30/24 with no restrictions .    If you have any questions or concerns, please don't hesitate to call.         Sincerely,          Amos Duval MD        CC: No Recipients

## 2024-12-17 ENCOUNTER — TELEPHONE (OUTPATIENT)
Dept: OBGYN CLINIC | Facility: HOSPITAL | Age: 24
End: 2024-12-17

## 2024-12-17 NOTE — TELEPHONE ENCOUNTER
Caller: Sheila    Doctor: Mukund    Reason for call: Patient calling asking if she needs to keep her appointment? States you returned her to work full duty, she is not having any problems, she also started a new job and does not want to leave work if she does not have to.  Please advise.     Call back#: [phone]

## 2025-01-13 ENCOUNTER — TELEPHONE (OUTPATIENT)
Age: 25
End: 2025-01-13

## 2025-01-13 NOTE — TELEPHONE ENCOUNTER
Pt called in and states that she has been having stomach issues for past 5-6 days. Diarrhea, vomiting, cold. Not getting any better and pt isnt sure what to do going forward. Pt doesn't want to come into office due to her ins being costly if she doesn't want to.     Please advise and notify, thanks.

## 2025-01-13 NOTE — LETTER
January 14, 2025     Sheila Oakes    Patient: Sheila Oakes   YOB: 2000   Date of Visit: 1/13/2025       To whom it may concern:     Patient is currently ill with a GI illness and should be excused from work on 1/14/2025 and 1/15/2025 due to symptoms.    If you have questions, please do not hesitate to call me.        Sincerely,    Latisha Gupta PAC        CC: No Recipients

## 2025-01-14 ENCOUNTER — TELEPHONE (OUTPATIENT)
Age: 25
End: 2025-01-14

## 2025-01-14 NOTE — TELEPHONE ENCOUNTER
I agree, sounds suspicious for an infectious process, continue with supportive care.  If diarrhea fails to improve please let me know and we will check stool studies.  Okay for dicyclomine though avoid usage of Imodium.     If she develops severe abdominal pain, bloody diarrhea, inability to tolerate food or drink, please get seen in ER immediately.     I can provide an excuse letter for her. It is in the chart.

## 2025-01-14 NOTE — TELEPHONE ENCOUNTER
Pt called back. States she isnt throwing up anymore but constant diarrhea and her body feels achy and thinks she has a fever as well now. Very tired/lethargic and no appetite.     Please advise, thanks.

## 2025-01-14 NOTE — TELEPHONE ENCOUNTER
Pt returning missed call. Reviewed Latisha Gupta PA-C's recommendations found under the Notes section with pt. Pt verbalized understanding. ER precautions reviewed with pt and she verbalized understanding.

## 2025-01-14 NOTE — TELEPHONE ENCOUNTER
Pt returning call to office.  Reviewed provider's recommendations.  Pt states she really hasn't been able to eat due to symptoms.  Reassured she can still increase po fluids, more concerned that she stays hydrated.  Can advance to bland foods when appetite returns.      Pt also wanted to make sure that provider knows she had fever 100.2 yesterday and still has body aches today.  Advised that if this is a viral illness, it is typical to feel this way.  Recommend trying Tylenol and follow up with PCP if still having body aches/fever.  Pt states she has not seen PCP yet, due to high deductible, but has been in contact with their office.  Pt would also like to know if provider can provide a doctor's note for her if she misses work tomorrow.  States she left work early yesterday and was out sick today, unsure if she will be able to return tomorrow.    Please advise.

## 2025-01-14 NOTE — TELEPHONE ENCOUNTER
Pt may have been exposed to something infectious vs having a flare up of her chronic GI issues which are most likely IBS but a colonoscopy was recommended.    For now I agree with supportive care, bland diet, lots of hydration. Safe to take anti-cramping pill dicyclomine every 6 hours as needed, this should help the abdominal pain and slow down diarrhea.     Avoid anything dairy, greasy/oily, heavy, creamy, tomato or citrus.    If diarrhea doesn't improve we can repeat stool testing.   When she is feeling better recommend scheduling colonoscopy.

## 2025-01-15 NOTE — TELEPHONE ENCOUNTER
Left message on patient voicemail to call back for recommendations. Appears this is the third attempt

## 2025-01-21 ENCOUNTER — PATIENT MESSAGE (OUTPATIENT)
Dept: NEUROLOGY | Facility: CLINIC | Age: 25
End: 2025-01-21

## 2025-01-21 NOTE — PATIENT COMMUNICATION
Called to gather more info and left a general VM with a call back #. Awaiting return call.    Any recommendations in the interim?

## 2025-01-22 NOTE — PATIENT COMMUNICATION
Recommend for patient to continue follow-up with Cardiology and her PCP for syncopal work-up. It looks like a stress test, Holter monitor and ECHO were ordered but not yet completed. She should also stay well hydrated.

## 2025-01-27 ENCOUNTER — TELEPHONE (OUTPATIENT)
Dept: FAMILY MEDICINE CLINIC | Facility: CLINIC | Age: 25
End: 2025-01-27

## 2025-01-27 DIAGNOSIS — R42 DIZZINESS: Primary | ICD-10-CM

## 2025-01-27 NOTE — TELEPHONE ENCOUNTER
Does she want bloodwork ordered? Does she feel like she needs to go to the ER for IV fluids?  If she wants bloodwork- please order CMP, CBC, Iron Panel.

## 2025-01-27 NOTE — TELEPHONE ENCOUNTER
Patient reached out stating she is having dizzy spells again, and feels like it did when she had syncopal episode at the vet a few months ago. She reached out to neurology and was referred back to us. She does have cardiac testing scheduled. Asking for PCP advise.

## 2025-02-10 NOTE — PATIENT INSTRUCTIONS
Post procedure phone call completed; patient understood all discharge paperwork. No questions regarding medications or pain management. MD follow up appointment made. Patient was able to rest when they were discharged. Patient will recommend [facility], no complaints of hospital stay, satisfied with care. Instructed patient to contact provider with any further questions or concerns. Problem List Items Addressed This Visit          Respiratory    Allergic rhinitis due to pollen    Relevant Orders    Ambulatory Referral to Otolaryngology       Nervous and Auditory    Recurrent acute suppurative otitis media without spontaneous rupture of tympanic membrane of both sides     3/3/2023 Neurology  Patient seen in outpatient office today as a referral by PCP for further work-up and evaluation of recurrent ear infections with unknown etiology  Patient states she has had recurrent ear infections as a young child and had at least 6 tympanostomy tube placements last one as a child  She reports at least getting 3 ear infections a month and currently has a ear infection in her right ear following with ENT  Imaging:  CT orbit/temporal bone/skull base without 12/14/2022: There is no abnormality evident  Plan:  Reviewed imaging with patient  Patient requires further work-up and evaluation of recurrent ear infections  Ordered MRI brain IAC for further evaluation of possible etiology given recurrent ear infections  We will follow-up in the next few weeks once imaging completed or sooner symptoms worsen  Patient made aware to seek care sooner if she develops any new or worsening neurological change or flag signs  Patient made aware to contact neurosurgery with any further questions or concerns      3/20/2023  MRI ordered but patient cancelled     5/16/2023  Referral to ENT  Right ear is worse than Left ear   Will start her on penicillin and flagyl          Relevant Medications    penicillin V potassium (VEETID) 500 mg tablet    metroNIDAZOLE (FLAGYL) 500 mg tablet       Other    History of recurrent ear infection     5/16/2023  Will place a referral to ENT         Relevant Orders    Ambulatory Referral to Otolaryngology    CIARA (generalized anxiety disorder) - Primary     10/19/2022  Started on Lexapro 10mg    4/21/2023  Started on Buspar 10mg  You start with 1 pill daily for 3- 4 days, then take 1 pill twice a day for about 7 days  Add the 'middle of the day' pill only if you are still feeling anxious  Now, there are times when this medication is also used as a prn for when you start feeling anxious, you could try that as well  5/16/2023  Doing well         Relevant Medications    amphetamine-dextroamphetamine (ADDERALL XR, 20MG,) 20 MG 24 hr capsule    Adult ADHD     11/4/2022   Started on Adderall 5mg  12/2/2022  Increased to Adderall 10mg  12/22/2022  Increased to Adderall 15mg  5/16/2023  Increase Adderall 20mg   As the patient has been noticing some decreased ability to concentrate          Relevant Medications    amphetamine-dextroamphetamine (ADDERALL XR, 20MG,) 20 MG 24 hr capsule    Chest tightness     Most likely due to seasonal allergies   Will order her albuterol inhaler   Continue to monitor her         Relevant Medications    albuterol (Ventolin HFA) 90 mcg/act inhaler    History of CT scan of head     12/14/2022 CT Orbits/Temporal Bones  FINDINGS:   RIGHT TEMPORAL BONE:  PERIAURICULAR SOFT TISSUES:  Normal    EXTERNAL AUDITORY CANAL: Normal    MIDDLE EAR: Normal    OSSICLES:Normal    MASTOID AIR CELLS: Normal    COCHLEA: Normal    OTIC CAPSULE: Normal mineralization  VESTIBULE: Normal    VESTIBULAR AND COCHLEAR AQUEDUCT: Normal   SEMICIRCULAR CANALS: Normal    INTERNAL AUDITORY CANAL: Normal    FACIAL NERVE CANAL: Normal    CAROTID CANAL: Normal    JUGULAR FORAMEN: Normal    LEFT TEMPORAL BONE:  PERIAURICULAR SOFT TISSUES:  Normal    EXTERNAL AUDITORY CANAL: Normal    MIDDLE EAR: Normal    OSSICLES:Normal    MASTOID AIR CELLS: Normal    COCHLEA: Normal    OTIC CAPSULE: Normal mineralization  VESTIBULE: Normal    VESTIBULAR AND COCHLEAR AQUEDUCT: Normal   SEMICIRCULAR CANALS: Normal    INTERNAL AUDITORY CANAL: Normal    FACIAL NERVE CANAL: Normal    CAROTID CANAL: Normal    JUGULAR FORAMEN: Normal    OTHER FINDINGS:  None  IMPRESSION:   There is no abnormality evident           Generalized abdominal pain     3/7/2023 GI  - Giardia Antigen with Reflex to Ova and Parasites; Future  - Celiac Disease Panel; Future  - Calprotectin,Fecal; Future  - US abdomen complete; Future  - dicyclomine (BENTYL) 10 mg capsule; Take 1 capsule (10 mg total) by mouth 3 (three) times a day as needed (abdominal pain)  Dispense: 30 capsule; Refill: 2  Overall 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, less likely Celiac disease, giardia, inflammatory bowel disease  Given intermittent RLQ pain unrelated to bowel movements, would also want to rule out ovarian/appendiceal etiology for pain  Plan for abdominal US, and non-invasive testing to start including stool studies and Celiac disease panel  Prior H pylori testing negative in 2020  If non-invasive testing unremarkable and persistent symptoms particularly nausea/vomiting, may then plan for EGD  Can also consider trial of Xifaxin if symptoms continue    Will plan for as needed Bentyl    Ultrasound ordered - not complete as of today  Stool studies ordered - not complete as of today    5/16/2023  Doing well at this time

## 2025-02-17 ENCOUNTER — OFFICE VISIT (OUTPATIENT)
Dept: FAMILY MEDICINE CLINIC | Facility: CLINIC | Age: 25
End: 2025-02-17
Payer: COMMERCIAL

## 2025-02-17 DIAGNOSIS — F41.0 PANIC ATTACKS: ICD-10-CM

## 2025-02-17 DIAGNOSIS — F41.1 GAD (GENERALIZED ANXIETY DISORDER): Primary | ICD-10-CM

## 2025-02-17 PROCEDURE — 99214 OFFICE O/P EST MOD 30 MIN: CPT | Performed by: NURSE PRACTITIONER

## 2025-02-17 RX ORDER — HYDROXYZINE HYDROCHLORIDE 25 MG/1
25 TABLET, FILM COATED ORAL EVERY 6 HOURS PRN
Qty: 30 TABLET | Refills: 1 | Status: SHIPPED | OUTPATIENT
Start: 2025-02-17

## 2025-02-17 RX ORDER — ESCITALOPRAM OXALATE 5 MG/1
5 TABLET ORAL DAILY
Qty: 90 TABLET | Refills: 0 | Status: SHIPPED | OUTPATIENT
Start: 2025-02-17

## 2025-02-17 NOTE — ASSESSMENT & PLAN NOTE
Orders:    escitalopram (LEXAPRO) 5 mg tablet; Take 1 tablet (5 mg total) by mouth daily    hydrOXYzine HCL (ATARAX) 25 mg tablet; Take 1 tablet (25 mg total) by mouth every 6 (six) hours as needed for anxiety (insomnia)

## 2025-02-17 NOTE — PROGRESS NOTES
Name: Sheila Oakes      : 2000      MRN: [id number]  Encounter Provider: PABLITO Guidry  Encounter Date: 2025   Encounter department: [facility]  :  Assessment & Plan  CIARA (generalized anxiety disorder)    Orders:    escitalopram (LEXAPRO) 5 mg tablet; Take 1 tablet (5 mg total) by mouth daily    hydrOXYzine HCL (ATARAX) 25 mg tablet; Take 1 tablet (25 mg total) by mouth every 6 (six) hours as needed for anxiety (insomnia)    Panic attacks               Depression Screening and Follow-up Plan: Patient was screened for depression during today's encounter. They screened negative with a PHQ-2 score of 0.        History of Present Illness   Here to discuss dizziness, chest pain, heaviness on chest- episodes occur about 3 times a month- She was seen by Neurology and told she did not need any more testing- to follow up with her PCP  She reports that her episodes are precipitated by feeling stressed about school loans and other life stressors.   Follows closely with Cardiology- was told to see Neurology, Neurology told her to come back to PCP.     EKG done in office- short DC NSR      Review of Systems   Constitutional:  Negative for activity change, diaphoresis, fatigue and fever.   HENT:  Negative for congestion, facial swelling, hearing loss, rhinorrhea, sinus pressure, sinus pain, sneezing, sore throat and voice change.    Eyes:  Negative for discharge and visual disturbance.   Respiratory:  Positive for chest tightness (during episode, none now). Negative for cough, choking, shortness of breath, wheezing and stridor.    Cardiovascular:  Positive for palpitations (during episodes). Negative for chest pain and leg swelling.   Gastrointestinal:  Positive for nausea (chronic- IBS). Negative for abdominal distention, abdominal pain, constipation, diarrhea and vomiting.   Endocrine: Negative for polydipsia, polyphagia and polyuria.   Genitourinary:  Negative for difficulty  urinating, dysuria, frequency and urgency.   Musculoskeletal:  Negative for arthralgias, back pain, gait problem, joint swelling, myalgias, neck pain and neck stiffness.   Skin:  Negative for color change, rash and wound.   Neurological:  Positive for syncope (during episodes). Negative for dizziness, speech difficulty, weakness, light-headedness and headaches.   Hematological:  Negative for adenopathy. Does not bruise/bleed easily.   Psychiatric/Behavioral:  Positive for dysphoric mood. Negative for agitation, behavioral problems, confusion, hallucinations, sleep disturbance and suicidal ideas. The patient is nervous/anxious.        Objective   There were no vitals taken for this visit.     Physical Exam  Vitals and nursing note reviewed.   Constitutional:       General: She is not in acute distress.     Appearance: She is well-developed. She is not diaphoretic.   Cardiovascular:      Rate and Rhythm: Normal rate and regular rhythm.      Heart sounds: Normal heart sounds. No murmur heard.  Pulmonary:      Effort: Pulmonary effort is normal. No respiratory distress.      Breath sounds: Normal breath sounds. No wheezing.   Musculoskeletal:         General: No tenderness or deformity. Normal range of motion.   Skin:     General: Skin is warm and dry.      Findings: No erythema or rash.   Neurological:      Mental Status: She is alert and oriented to person, place, and time.   Psychiatric:         Attention and Perception: Attention normal.         Mood and Affect: Mood is anxious. Affect is flat.         Speech: Speech normal.         Behavior: Behavior normal. Behavior is cooperative.         Thought Content: Thought content normal.         Cognition and Memory: Cognition and memory normal.         Judgment: Judgment normal.

## 2025-03-05 ENCOUNTER — CLINICAL SUPPORT (OUTPATIENT)
Dept: CARDIOLOGY CLINIC | Facility: CLINIC | Age: 25
End: 2025-03-05
Payer: COMMERCIAL

## 2025-03-05 ENCOUNTER — RESULTS FOLLOW-UP (OUTPATIENT)
Dept: NON INVASIVE DIAGNOSTICS | Facility: HOSPITAL | Age: 25
End: 2025-03-05

## 2025-03-05 DIAGNOSIS — R55 SYNCOPE, UNSPECIFIED SYNCOPE TYPE: ICD-10-CM

## 2025-03-05 DIAGNOSIS — R42 DIZZINESS: ICD-10-CM

## 2025-03-05 DIAGNOSIS — R94.31 SHORTENED PR INTERVAL: ICD-10-CM

## 2025-03-05 PROCEDURE — 93228 REMOTE 30 DAY ECG REV/REPORT: CPT | Performed by: INTERNAL MEDICINE

## 2025-03-20 ENCOUNTER — TELEPHONE (OUTPATIENT)
Dept: GASTROENTEROLOGY | Facility: CLINIC | Age: 25
End: 2025-03-20

## 2025-03-20 DIAGNOSIS — Z30.011 ENCOUNTER FOR PRESCRIPTION OF ORAL CONTRACEPTIVES: ICD-10-CM

## 2025-03-20 NOTE — TELEPHONE ENCOUNTER
I have not seen patient since November. However, patient was referred back to her PCP/Cardiology for syncopal work-up and it looks like there a few tests have not been completed. I would not be able to provide clearance for her EGD at this point. Would refer back to PCP/Cardiology for clearance.

## 2025-03-20 NOTE — TELEPHONE ENCOUNTER
Patient is possibly going to be going for a EGD. Does this patient need to be seen in office or can a clearance be done from the last appointment?

## 2025-03-21 RX ORDER — NORGESTIMATE AND ETHINYL ESTRADIOL 7DAYSX3 28
1 KIT ORAL DAILY
Qty: 84 TABLET | Refills: 1 | Status: SHIPPED | OUTPATIENT
Start: 2025-03-21

## 2025-03-24 ENCOUNTER — PATIENT MESSAGE (OUTPATIENT)
Dept: NEUROLOGY | Facility: CLINIC | Age: 25
End: 2025-03-24

## 2025-03-25 ENCOUNTER — NURSE TRIAGE (OUTPATIENT)
Age: 25
End: 2025-03-25

## 2025-03-25 NOTE — TELEPHONE ENCOUNTER
Follow up call to patient regarding Malinda's message below. Unable to leave a message as voicemail has not been set up.     Will try again.

## 2025-03-25 NOTE — TELEPHONE ENCOUNTER
Please encourage patient to complete her cardiac work-up /MRI brain that was previously ordered for her.   I will not be able to clear patient for her procedure if she has not had the appropriate work-up completed.     I have not been following patient for her headaches. She is followed by ortho concussion clinic.    If headaches are worsening, patient can go to Urgent care or the ED for immediate treatment.

## 2025-03-25 NOTE — TELEPHONE ENCOUNTER
"Patient sent my chart message:    Sheila SALMER[facility] (supporting PABLITO Andrade)17 hours ago (3:08 PM)       Good Morning,   I am still experiencing headaches and I keep getting episodes of where my vision goes blurry and then I get really hot and feel how I felt right before I passed out in September?    Please advise  __________________    FOLLOW UP: Per protocol, see patient in office in 2 weeks; first available is 5/14; added to wait list; sent to urgent add on pool to try to move up visit.  Patient also asking for neurology clearance for GI procedure.    REASON FOR CONVERSATION: Dizziness    SYMPTOMS: headaches, blurred vision, tunnel vision, feeling like she will pass out    OTHER: Patient said PCP recommended neurology f/u and psychiatry consult;  Per chart review, CTS also recommended f/u with [facility]ussion [facility] to report symptoms as they are following patient also. Patient also followed by cardiology;  Stress test and echo scheduled 4/17.    DISPOSITION: See Within 2 Weeks in Office          Reason for Disposition   Dizziness not present now, but is a chronic symptom (recurrent or ongoing AND lasting > 4 weeks)    Answer Assessment - Initial Assessment Questions  1. DESCRIPTION: \"Describe your dizziness.\"      Intermittent dizziness then vision goes blurry with tunnel vision  2. LIGHTHEADED: \"Do you feel lightheaded?\" (e.g., somewhat faint, woozy, weak upon standing)      Last occurred yesterday while sitting looking at her computer  3. VERTIGO: \"Do you feel like either you or the room is spinning or tilting?\" (i.e., vertigo)      Feels like she is going to pass out  4. SEVERITY: \"How bad is it?\"  \"Do you feel like you are going to faint?\" \"Can you stand and walk?\"      Denies LOC since last office visit  5. ONSET:  \"When did the dizziness begin?\"      S/p concussion  6. AGGRAVATING FACTORS: \"Does anything make it worse?\" (e.g., standing, change in head position)      " "unsure  7. HEART RATE: \"Can you tell me your heart rate?\" \"How many beats in 15 seconds?\"  (Note: Not all patients can do this.)        Patient said she just wore holter monitor and was told it was okay.  Echo and stress test scheduled 4/17.  8. CAUSE: \"What do you think is causing the dizziness?\" (e.g., decreased fluids or food, diarrhea, emotional distress, heat exposure, new medicine, sudden standing, vomiting; unknown)      Patient said PCP advised symptoms may be caused from anxiety and referred her to psychiatry; she will also complete her cardiac workup; also said PCP asked her to schedule office visit with neurology  to assess if further testing needed.  9. RECURRENT SYMPTOM: \"Have you had dizziness before?\" If Yes, ask: \"When was the last time?\" \"What happened that time?\"      Chronic since concussion  10. OTHER SYMPTOMS: \"Do you have any other symptoms?\" (e.g., fever, chest pain, vomiting, diarrhea, bleeding)        Headaches 4-5 x/week that come and go, using tylenol or ibuprofen as recommended by ortho concussion clinic who is following patient.  11. PREGNANCY: \"Is there any chance you are pregnant?\" \"When was your last menstrual period?\"        Did not address    Protocols used: Dizziness-Adult-[place]    "

## 2025-03-26 NOTE — TELEPHONE ENCOUNTER
Follow up call to patient regarding Malinda's message below. Left message for patient to back. (Voicemail set up.)

## 2025-03-27 NOTE — TELEPHONE ENCOUNTER
THIRD ATTEMPT:  Follow up call to patient regarding Malinda's message below. Unable to leave a message.

## 2025-03-28 ENCOUNTER — OFFICE VISIT (OUTPATIENT)
Dept: OBGYN CLINIC | Facility: CLINIC | Age: 25
End: 2025-03-28
Payer: COMMERCIAL

## 2025-03-28 VITALS — TEMPERATURE: 98.5 F | BODY MASS INDEX: 27.6 KG/M2 | OXYGEN SATURATION: 96 % | WEIGHT: 171 LBS | HEART RATE: 95 BPM

## 2025-03-28 DIAGNOSIS — H53.9 VISION DISORDER: ICD-10-CM

## 2025-03-28 DIAGNOSIS — R51.9 WORSENING HEADACHES: ICD-10-CM

## 2025-03-28 DIAGNOSIS — R41.840 DIFFICULTY CONCENTRATING: ICD-10-CM

## 2025-03-28 DIAGNOSIS — G44.319 ACUTE POST-TRAUMATIC HEADACHE, NOT INTRACTABLE: ICD-10-CM

## 2025-03-28 DIAGNOSIS — R42 DIZZINESS: ICD-10-CM

## 2025-03-28 DIAGNOSIS — S06.0X0D CONCUSSION WITHOUT LOSS OF CONSCIOUSNESS, SUBSEQUENT ENCOUNTER: Primary | ICD-10-CM

## 2025-03-28 PROCEDURE — 99214 OFFICE O/P EST MOD 30 MIN: CPT | Performed by: FAMILY MEDICINE

## 2025-03-28 NOTE — LETTER
March 28, 2025     Patient: Sheila Oakes  YOB: 2000  Date of Visit: 3/28/2025      To Whom it May Concern:    Sheila Oakes is under my professional care. Sheila was seen in my office on 3/28/2025.    If you have any questions or concerns, please don't hesitate to call.         Sincerely,          Amos Duval MD        CC: No Recipients

## 2025-03-28 NOTE — PROGRESS NOTES
Assessment & Plan      Assessment:    1. Concussion without loss of consciousness, subsequent encounter  -     Ambulatory Referral to Physical Therapy; Future  -     MRI brain and orbits wo contrast; Future; Expected date: 03/28/2025  2. Dizziness  -     Ambulatory Referral to Physical Therapy; Future  3. Vision disorder  -     Ambulatory Referral to Physical Therapy; Future  4. Acute post-traumatic headache, not intractable  -     Ambulatory Referral to Physical Therapy; Future  5. Difficulty concentrating  -     Ambulatory Referral to Physical Therapy; Future  6. Worsening headaches  -     MRI brain and orbits wo contrast; Future; Expected date: 03/28/2025      Assessment & Plan  Concussion without loss of consciousness, subsequent encounter    Orders:    Ambulatory Referral to Physical Therapy; Future    MRI brain and orbits wo contrast; Future    Dizziness    Orders:    Ambulatory Referral to Physical Therapy; Future    Vision disorder    Orders:    Ambulatory Referral to Physical Therapy; Future    Acute post-traumatic headache, not intractable    Orders:    Ambulatory Referral to Physical Therapy; Future    Difficulty concentrating    Orders:    Ambulatory Referral to Physical Therapy; Future    Worsening headaches    Orders:    MRI brain and orbits wo contrast; Future    Plan:    Patient Instructions   F/u 3 wks  Begin physical therapy- vision therapy  Continue screen time  as tolerated.  Ibuprofen/advil/aleve/excedrin as needed sparingly.  Continue exercising  MRI brain-  hx of concussion/head injury/worsening HA/CT neg        Return in about 3 weeks (around 4/18/2025) for Recheck.      Subjective        Chief Complaint:   Concussion (F/U)      HPI      Related to:  fainting and hit head.      Sheila Oakes is a 24 y.o. female who presents today for follow up of concussion    Since the last visit- still having HA  Hx of HA but these are different than those HA.  Similar to the post concussion  HA.  Prolonged computer use worsens HA. Computer screens/lights bother her. Looking up down causes HA.  Sometimes having difficulty with expressing what she is thinking.  Takes tylenol- no helps  When she feels that she is going to pass out- happens randomly 4-5x per month  Sometimes occurs while sitting- dizzy, blurry vision, felt hot, heart racing. Lasted about 5-10 min. She didn't pass out but has passed out before. She has passed out with watching blood.  HA is worsening      LThe current concussion symptom score is 8/22 including headache, visual problem, dizziness, fatigue, sensitivity to light, foggy, slowed down, and difficulty concentrating  Do symptoms worsen with Physical Activity?  No  Do symptoms worsen with Cognitive Activity?  Yes HA  Overall Rating:  What percent is this person back to normal?  Patient 80 %    Review of Systems   Constitutional:  Negative for fatigue and fever.   Respiratory:  Negative for shortness of breath.    Cardiovascular:  Negative for chest pain.   Gastrointestinal:  Negative for abdominal pain and nausea.   Genitourinary:  Negative for dysuria.   Skin:  Negative for rash and wound.   Neurological:  Positive for dizziness and headaches. Negative for weakness.     Symptoms Checklist      Flowsheet Row Most Recent Value   Physical    Headache 1   Nausea 0   Vomiting 0  [Last time one week ago]   Balance problems 0   Dizziness 1   Visual problems 1   Fatigue 1   Sensitivity to light 1   Sensitivity to noise 0   Numbness / tingling 0   TOTAL PHYSICAL SCORE 5   Cognitive    Foggy 1   Slowed down 1   Difficulty concentrating 1   Difficulty remembering 0   TOTAL COGNITIVE SCORE 3   Emotional    Irritability 0   Sadness 0   More emotional 0   Nervousness 0   TOTAL EMOTIONAL SCORE 0   Sleep    Drowsiness 0   Sleeping less 0   Sleeping more 0   Difficulty falling asleep 0   TOTAL SLEEP SCORE 0   TOTAL SYMPTOM SCORE 8          Symptoms Checklist      Flowsheet Row Most Recent Value    Physical    Headache 1   Nausea 0   Vomiting 0  [Last time one week ago]   Balance problems 0   Dizziness 1   Visual problems 1   Fatigue 1   Sensitivity to light 1   Sensitivity to noise 0   Numbness / tingling 0   TOTAL PHYSICAL SCORE 5   Cognitive    Foggy 1   Slowed down 1   Difficulty concentrating 1   Difficulty remembering 0   TOTAL COGNITIVE SCORE 3   Emotional    Irritability 0   Sadness 0   More emotional 0   Nervousness 0   TOTAL EMOTIONAL SCORE 0   Sleep    Drowsiness 0   Sleeping less 0   Sleeping more 0   Difficulty falling asleep 0   TOTAL SLEEP SCORE 0   TOTAL SYMPTOM SCORE 8                 Objective      Pulse 95   Temp 98.5 °F (36.9 °C) (Tympanic)   Wt 77.6 kg (171 lb)   SpO2 96%   BMI 27.60 kg/m²   Body mass index is 27.6 kg/m².   Patient Active Problem List   Diagnosis    Allergic rhinitis due to pollen    History of recurrent ear infection    CIARA (generalized anxiety disorder)    Annual physical exam    Adult ADHD    Neuralgia, geniculate    Chest tightness    Labyrinthitis of right ear    History of CT scan of head    Generalized abdominal pain    Other headache syndrome    Vitamin D deficiency    B12 deficiency    Other fatigue    Irritable bowel syndrome with diarrhea    COVID-19 long hauler    Syncope    History of COVID-19    Post concussion syndrome    Panic attacks        Meds/Allergies   Current Outpatient Medications on File Prior to Visit   Medication Sig Dispense Refill    albuterol (ProAir HFA) 90 mcg/act inhaler Inhale 2 puffs every 4 (four) hours as needed for wheezing or shortness of breath 8.5 g 1    dicyclomine (BENTYL) 20 mg tablet Take 1 tablet (20 mg total) by mouth every 6 (six) hours as needed (abdominal cramping and diarrhea) 30 tablet 1    fexofenadine (ALLEGRA) 180 MG tablet Take 180 mg by mouth as needed      hydrOXYzine HCL (ATARAX) 25 mg tablet Take 1 tablet (25 mg total) by mouth every 6 (six) hours as needed for anxiety (insomnia) 30 tablet 1    lactase  (LACTAID) 3,000 units tablet Take 1 tablet (3,000 Units total) by mouth 3 (three) times a day with meals 90 tablet 0    meclizine (ANTIVERT) 25 mg tablet Take 1 tablet (25 mg total) by mouth 3 (three) times a day as needed for dizziness 30 tablet 0    norgestimate-ethinyl estradiol (ORTHO TRI-CYCLEN,TRINESSA) 0.18/0.215/0.25 MG-35 MCG per tablet TAKE 1 TABLET BY MOUTH EVERY DAY 84 tablet 1    ondansetron (ZOFRAN) 4 mg tablet Take 1 tablet (4 mg total) by mouth every 8 (eight) hours as needed for nausea or vomiting 20 tablet 0    escitalopram (LEXAPRO) 5 mg tablet Take 1 tablet (5 mg total) by mouth daily (Patient not taking: Reported on 3/28/2025) 90 tablet 0     No current facility-administered medications on file prior to visit.      Allergies   Allergen Reactions    Mupirocin      tongue swollen    Iv Contrast [Iodinated Contrast Media] Hives     Patient developed a few hives on the face/neck after IV contrast. No other associated symptoms.     Sulfa Antibiotics Rash          The following portions of the patient's history were reviewed and updated as appropriate: allergies, current medications, past family history, past medical history, past social history, past surgical history, and problem list.  PHQ-A Screening            Past Medical History:   Diagnosis Date    Anxiety     Bilateral ovarian cysts     Kidney stones      Past Surgical History:   Procedure Laterality Date    ADENOIDECTOMY      TONSILECTOMY AND ADNOIDECTOMY      TONSILLECTOMY      TYMPANOSTOMY      TYMPANOSTOMY TUBE PLACEMENT      6 sets - last time as a child    WISDOM TOOTH EXTRACTION       Family History   Problem Relation Age of Onset    Fibroids Mother     GI problems Father     Seizures Sister     Arthritis Maternal Grandmother        Social History   Social Drivers of Health     Tobacco Use: Low Risk  (3/28/2025)    Patient History     Smoking Tobacco Use: Never     Smokeless Tobacco Use: Never     Passive Exposure: Never   Alcohol Use:  Not At Risk (6/12/2021)    AUDIT-C     Frequency of Alcohol Consumption: 2-4 times a month     Average Number of Drinks: 1 or 2     Frequency of Binge Drinking: Never   Financial Resource Strain: Not on file   Food Insecurity: Not on file   Transportation Needs: Not on file   Physical Activity: Not on file   Stress: Not on file   Social Connections: Not on file   Intimate Partner Violence: Not on file   Depression: Not at risk (2/17/2025)    PHQ-2     PHQ-2 Score: 0   Housing Stability: Not on file   Utilities: Not on file   Health Literacy: Not on file      Social History     Substance and Sexual Activity   Alcohol Use Yes    Comment: socially      Social History     Substance and Sexual Activity   Drug Use No     Social History     Tobacco Use   Smoking Status Never    Passive exposure: Never   Smokeless Tobacco Never           Physical Exam   Physical Exam  Constitutional:       Appearance: Normal appearance.   HENT:      Head: Normocephalic.   Pulmonary:      Effort: Pulmonary effort is normal.   Musculoskeletal:         General: Normal range of motion.      Cervical back: Normal range of motion.   Skin:     General: Skin is warm.   Neurological:      General: No focal deficit present.      Mental Status: She is alert and oriented to person, place, and time. Mental status is at baseline.   Psychiatric:         Mood and Affect: Mood normal.        Ortho Exam    General:   No apparent distress:  yes    Psych:   AAOX3: yes   Mood and Affect:  Normal    HEENT:   PEERLA: yes   EOM pain: No   EOM nystagmus: No    Neuro:   Convergence:  Normal  5  cm   Finger to nose:  Normal   Dysdiadokinesia: No   Examination of Coordination:  Normal   CNII - XII Intact:  yes    Vestibular Ocular:    Gaze stability: Abnormal:   Nystagmus with verticle motion, Nystagmus with horizontal motion, Dizziness with verticle motion, and Dizziness with horizontal motion    Modified Balance Error Scoring System (M-KORY) 10 seconds each.  Single  leg stance:  2 error(s).  Tandem stance:  0 error(s).    Cervical spine:  Mid-line tenderness: No  Tinel's positive over greater occipital nerve: No  ROM full: yes  Strength UE: Normal  Reflexes:     Symmetric bilateral patellar tendon: Normal             ImPACT Neurocognitive Test Interpretation:     Amos Duval MD

## 2025-03-28 NOTE — PATIENT INSTRUCTIONS
F/u 3 wks  Begin physical therapy- vision therapy  Continue screen time  as tolerated.  Ibuprofen/advil/aleve/excedrin as needed sparingly.  Continue exercising  MRI brain-  hx of concussion/head injury/worsening HA/CT neg

## 2025-04-09 ENCOUNTER — TELEPHONE (OUTPATIENT)
Dept: OBGYN CLINIC | Facility: HOSPITAL | Age: 25
End: 2025-04-09

## 2025-04-09 NOTE — TELEPHONE ENCOUNTER
Caller: Patient    Doctor: Dr. Duval    Reason for call: Faxed Vision therapy script to [facility] @ fax # [phone]    Call back#: [phone]

## 2025-04-15 DIAGNOSIS — H60.503 ACUTE OTITIS EXTERNA OF BOTH EARS, UNSPECIFIED TYPE: ICD-10-CM

## 2025-04-15 RX ORDER — NEOMYCIN SULFATE, POLYMYXIN B SULFATE, HYDROCORTISONE 3.5; 10000; 1 MG/ML; [USP'U]/ML; MG/ML
4 SOLUTION/ DROPS AURICULAR (OTIC) 4 TIMES DAILY
Qty: 10 ML | Refills: 0 | Status: SHIPPED | OUTPATIENT
Start: 2025-04-15

## 2025-04-21 ENCOUNTER — HOSPITAL ENCOUNTER (OUTPATIENT)
Dept: MRI IMAGING | Facility: HOSPITAL | Age: 25
Discharge: HOME | End: 2025-04-21
Attending: FAMILY MEDICINE
Payer: COMMERCIAL

## 2025-04-21 ENCOUNTER — PATIENT MESSAGE (OUTPATIENT)
Dept: FAMILY MEDICINE CLINIC | Facility: CLINIC | Age: 25
End: 2025-04-21

## 2025-04-21 DIAGNOSIS — S06.0X0D CONCUSSION WITHOUT LOSS OF CONSCIOUSNESS, SUBSEQUENT ENCOUNTER: ICD-10-CM

## 2025-04-21 DIAGNOSIS — Z13.1 SCREENING FOR DIABETES MELLITUS: Primary | ICD-10-CM

## 2025-04-21 DIAGNOSIS — R51.9 WORSENING HEADACHES: ICD-10-CM

## 2025-04-21 PROCEDURE — A9585 GADOBUTROL INJECTION: HCPCS | Performed by: FAMILY MEDICINE

## 2025-04-21 PROCEDURE — 70540 MRI ORBIT/FACE/NECK W/O DYE: CPT

## 2025-04-21 PROCEDURE — 70551 MRI BRAIN STEM W/O DYE: CPT

## 2025-04-21 RX ORDER — GADOBUTROL 604.72 MG/ML
7 INJECTION INTRAVENOUS
Status: COMPLETED | OUTPATIENT
Start: 2025-04-21 | End: 2025-04-21

## 2025-04-21 RX ADMIN — GADOBUTROL 7 ML: 604.72 INJECTION INTRAVENOUS at 10:53

## 2025-04-22 ENCOUNTER — APPOINTMENT (OUTPATIENT)
Dept: LAB | Facility: HOSPITAL | Age: 25
End: 2025-04-22
Payer: COMMERCIAL

## 2025-04-22 ENCOUNTER — HOSPITAL ENCOUNTER (OUTPATIENT)
Dept: NON INVASIVE DIAGNOSTICS | Facility: HOSPITAL | Age: 25
Discharge: HOME | End: 2025-04-22
Attending: INTERNAL MEDICINE
Payer: COMMERCIAL

## 2025-04-22 VITALS
HEIGHT: 66 IN | SYSTOLIC BLOOD PRESSURE: 106 MMHG | BODY MASS INDEX: 27.48 KG/M2 | DIASTOLIC BLOOD PRESSURE: 67 MMHG | WEIGHT: 171 LBS | HEART RATE: 95 BPM

## 2025-04-22 VITALS
DIASTOLIC BLOOD PRESSURE: 82 MMHG | HEIGHT: 66 IN | OXYGEN SATURATION: 99 % | HEART RATE: 100 BPM | SYSTOLIC BLOOD PRESSURE: 118 MMHG | BODY MASS INDEX: 27.6 KG/M2

## 2025-04-22 DIAGNOSIS — R94.31 SHORTENED PR INTERVAL: ICD-10-CM

## 2025-04-22 DIAGNOSIS — Z13.1 SCREENING FOR DIABETES MELLITUS: ICD-10-CM

## 2025-04-22 DIAGNOSIS — R42 DIZZINESS: ICD-10-CM

## 2025-04-22 DIAGNOSIS — R55 SYNCOPE, UNSPECIFIED SYNCOPE TYPE: ICD-10-CM

## 2025-04-22 LAB
ALBUMIN SERPL BCG-MCNC: 4.6 G/DL (ref 3.5–5)
ALP SERPL-CCNC: 67 U/L (ref 34–104)
ALT SERPL W P-5'-P-CCNC: 12 U/L (ref 7–52)
ANION GAP SERPL CALCULATED.3IONS-SCNC: 11 MMOL/L (ref 4–13)
AORTIC ROOT: 2.8 CM
AORTIC VALVE MEAN VELOCITY: 8.2 M/S
ASCENDING AORTA: 2.4 CM
AST SERPL W P-5'-P-CCNC: 16 U/L (ref 13–39)
AV MEAN PRESS GRAD SYS DOP V1V2: 3 MMHG
AV PEAK GRADIENT: 5 MMHG
AV VMAX SYS DOP: 1.12 M/S
BASOPHILS # BLD AUTO: 0.05 THOUSANDS/ÂΜL (ref 0–0.1)
BASOPHILS NFR BLD AUTO: 1 % (ref 0–1)
BILIRUB SERPL-MCNC: 0.57 MG/DL (ref 0.2–1)
BSA FOR ECHO PROCEDURE: 1.87 M2
BUN SERPL-MCNC: 12 MG/DL (ref 5–25)
CALCIUM SERPL-MCNC: 9.3 MG/DL (ref 8.4–10.2)
CHLORIDE SERPL-SCNC: 103 MMOL/L (ref 96–108)
CO2 SERPL-SCNC: 25 MMOL/L (ref 21–32)
CREAT SERPL-MCNC: 0.73 MG/DL (ref 0.6–1.3)
DOP CALC AO VTI: 21.8 CM
E WAVE DECELERATION TIME: 181 MS
E/A RATIO: 1.07
EOSINOPHIL # BLD AUTO: 0.14 THOUSAND/ÂΜL (ref 0–0.61)
EOSINOPHIL NFR BLD AUTO: 2 % (ref 0–6)
ERYTHROCYTE [DISTWIDTH] IN BLOOD BY AUTOMATED COUNT: 12.4 % (ref 11.6–15.1)
EST. AVERAGE GLUCOSE BLD GHB EST-MCNC: 105 MG/DL
FERRITIN SERPL-MCNC: 26 NG/ML (ref 30–307)
FRACTIONAL SHORTENING: 34 (ref 28–44)
GFR SERPL CREATININE-BSD FRML MDRD: 114 ML/MIN/1.73SQ M
GLUCOSE P FAST SERPL-MCNC: 96 MG/DL (ref 65–99)
HBA1C MFR BLD: 5.3 %
HCT VFR BLD AUTO: 43.3 % (ref 34.8–46.1)
HGB BLD-MCNC: 14.2 G/DL (ref 11.5–15.4)
IMM GRANULOCYTES # BLD AUTO: 0.01 THOUSAND/UL (ref 0–0.2)
IMM GRANULOCYTES NFR BLD AUTO: 0 % (ref 0–2)
INTERVENTRICULAR SEPTUM IN DIASTOLE (PARASTERNAL SHORT AXIS VIEW): 0.9 CM
INTERVENTRICULAR SEPTUM: 0.9 CM (ref 0.6–1.1)
IRON SATN MFR SERPL: 27 % (ref 15–50)
IRON SERPL-MCNC: 115 UG/DL (ref 50–212)
LAAS-AP2: 16.7 CM2
LAAS-AP4: 13.1 CM2
LEFT ATRIUM SIZE: 3 CM
LEFT ATRIUM VOLUME (MOD BIPLANE): 37 ML
LEFT ATRIUM VOLUME INDEX (MOD BIPLANE): 19.8 ML/M2
LEFT INTERNAL DIMENSION IN SYSTOLE: 3.1 CM (ref 2.1–4)
LEFT VENTRICULAR INTERNAL DIMENSION IN DIASTOLE: 4.7 CM (ref 3.5–6)
LEFT VENTRICULAR POSTERIOR WALL IN END DIASTOLE: 0.9 CM
LEFT VENTRICULAR STROKE VOLUME: 65 ML
LV EF US.2D.A4C+ESTIMATED: 62 %
LVSV (TEICH): 65 ML
LYMPHOCYTES # BLD AUTO: 3.34 THOUSANDS/ÂΜL (ref 0.6–4.47)
LYMPHOCYTES NFR BLD AUTO: 43 % (ref 14–44)
MAX HR PERCENT: 98 %
MAX HR: 193 BPM
MCH RBC QN AUTO: 29.5 PG (ref 26.8–34.3)
MCHC RBC AUTO-ENTMCNC: 32.8 G/DL (ref 31.4–37.4)
MCV RBC AUTO: 90 FL (ref 82–98)
MONOCYTES # BLD AUTO: 0.74 THOUSAND/ÂΜL (ref 0.17–1.22)
MONOCYTES NFR BLD AUTO: 10 % (ref 4–12)
MV E'TISSUE VEL-LAT: 16 CM/S
MV E'TISSUE VEL-SEP: 13 CM/S
MV PEAK A VEL: 0.85 M/S
MV PEAK E VEL: 91 CM/S
MV STENOSIS PRESSURE HALF TIME: 53 MS
MV VALVE AREA P 1/2 METHOD: 4.2
NEUTROPHILS # BLD AUTO: 3.51 THOUSANDS/ÂΜL (ref 1.85–7.62)
NEUTS SEG NFR BLD AUTO: 44 % (ref 43–75)
NRBC BLD AUTO-RTO: 0 /100 WBCS
PLATELET # BLD AUTO: 264 THOUSANDS/UL (ref 149–390)
PMV BLD AUTO: 10.5 FL (ref 8.9–12.7)
POTASSIUM SERPL-SCNC: 3.8 MMOL/L (ref 3.5–5.3)
PROT SERPL-MCNC: 7 G/DL (ref 6.4–8.4)
RATE PRESSURE PRODUCT: NORMAL
RBC # BLD AUTO: 4.82 MILLION/UL (ref 3.81–5.12)
RIGHT ATRIUM AREA SYSTOLE A4C: 12.9 CM2
RIGHT VENTRICLE ID DIMENSION: 3.3 CM
SL CV LEFT ATRIUM LENGTH A2C: 5.2 CM
SL CV LV EF: 65
SL CV PED ECHO LEFT VENTRICLE DIASTOLIC VOLUME (MOD BIPLANE) 2D: 102 ML
SL CV PED ECHO LEFT VENTRICLE SYSTOLIC VOLUME (MOD BIPLANE) 2D: 37 ML
SL CV STRESS RECOVERY BP: NORMAL MMHG
SL CV STRESS RECOVERY HR: 125 BPM
SL CV STRESS RECOVERY O2 SAT: 99 %
SL CV STRESS STAGE REACHED: 4
SODIUM SERPL-SCNC: 139 MMOL/L (ref 135–147)
STRESS BASELINE BP: NORMAL MMHG
STRESS BASELINE HR: 100 BPM
STRESS O2 SAT REST: 99 %
STRESS PEAK HR: 193 BPM
STRESS POST ESTIMATED WORKLOAD: 11.4 METS
STRESS POST EXERCISE DUR MIN: 9 MIN
STRESS POST EXERCISE DUR SEC: 51 SEC
STRESS POST O2 SAT PEAK: 97 %
STRESS POST PEAK BP: 154 MMHG
TIBC SERPL-MCNC: 427 UG/DL (ref 250–450)
TRANSFERRIN SERPL-MCNC: 305 MG/DL (ref 203–362)
TRICUSPID ANNULAR PLANE SYSTOLIC EXCURSION: 2.4 CM
UIBC SERPL-MCNC: 312 UG/DL (ref 155–355)
WBC # BLD AUTO: 7.79 THOUSAND/UL (ref 4.31–10.16)

## 2025-04-22 PROCEDURE — 93016 CV STRESS TEST SUPVJ ONLY: CPT | Performed by: INTERNAL MEDICINE

## 2025-04-22 PROCEDURE — 93018 CV STRESS TEST I&R ONLY: CPT | Performed by: INTERNAL MEDICINE

## 2025-04-22 PROCEDURE — 36415 COLL VENOUS BLD VENIPUNCTURE: CPT

## 2025-04-22 PROCEDURE — 93017 CV STRESS TEST TRACING ONLY: CPT

## 2025-04-22 PROCEDURE — 93306 TTE W/DOPPLER COMPLETE: CPT | Performed by: INTERNAL MEDICINE

## 2025-04-22 PROCEDURE — 93306 TTE W/DOPPLER COMPLETE: CPT

## 2025-04-22 PROCEDURE — 83036 HEMOGLOBIN GLYCOSYLATED A1C: CPT

## 2025-04-22 PROCEDURE — 80053 COMPREHEN METABOLIC PANEL: CPT

## 2025-04-23 ENCOUNTER — TELEPHONE (OUTPATIENT)
Age: 25
End: 2025-04-23

## 2025-04-23 DIAGNOSIS — S06.0X0D CONCUSSION WITHOUT LOSS OF CONSCIOUSNESS, SUBSEQUENT ENCOUNTER: Primary | ICD-10-CM

## 2025-04-23 NOTE — TELEPHONE ENCOUNTER
Caller: [facility]     Doctor: Dr. Duval    Reason for call: [facility] is calling in from [facility] stating that the patient is scheduled to be seen with them this morning at 9 am, she is asking if the script can be for Occupational therapy instead of what is currently listed as Physical therapy. It still needs to state evaluate and treat for vision therapy. She is asking if this can be updated and faxed over to them as soon as possible without this they are not able to see the patient. Once updated fax over to [phone].    Call back#: [phone]

## 2025-04-23 NOTE — TELEPHONE ENCOUNTER
Patient is at the office now,  can this script please be changed to OT.  Being seen for concussion.     Please fax to [phone]    Mariama Sweeney Glenda  [phone]

## 2025-04-30 ENCOUNTER — OFFICE VISIT (OUTPATIENT)
Dept: OBGYN CLINIC | Facility: CLINIC | Age: 25
End: 2025-04-30
Payer: COMMERCIAL

## 2025-04-30 VITALS
HEART RATE: 98 BPM | WEIGHT: 171 LBS | OXYGEN SATURATION: 99 % | BODY MASS INDEX: 27.48 KG/M2 | HEIGHT: 66 IN | TEMPERATURE: 99.6 F

## 2025-04-30 DIAGNOSIS — S06.0X0D CONCUSSION WITHOUT LOSS OF CONSCIOUSNESS, SUBSEQUENT ENCOUNTER: Primary | ICD-10-CM

## 2025-04-30 DIAGNOSIS — R42 DIZZINESS: ICD-10-CM

## 2025-04-30 DIAGNOSIS — H53.9 VISION DISORDER: ICD-10-CM

## 2025-04-30 DIAGNOSIS — R41.840 DIFFICULTY CONCENTRATING: ICD-10-CM

## 2025-04-30 DIAGNOSIS — G44.319 ACUTE POST-TRAUMATIC HEADACHE, NOT INTRACTABLE: ICD-10-CM

## 2025-04-30 PROCEDURE — 99214 OFFICE O/P EST MOD 30 MIN: CPT | Performed by: FAMILY MEDICINE

## 2025-04-30 RX ORDER — SUMATRIPTAN SUCCINATE 25 MG/1
25 TABLET ORAL ONCE AS NEEDED
Qty: 10 TABLET | Refills: 1 | Status: SHIPPED | OUTPATIENT
Start: 2025-04-30

## 2025-04-30 NOTE — PROGRESS NOTES
Assessment & Plan      Assessment:    1. Concussion without loss of consciousness, subsequent encounter  -     SUMAtriptan (Imitrex) 25 mg tablet; Take 1 tablet (25 mg total) by mouth once as needed for migraine  2. Dizziness  3. Acute post-traumatic headache, not intractable  -     SUMAtriptan (Imitrex) 25 mg tablet; Take 1 tablet (25 mg total) by mouth once as needed for migraine  4. Difficulty concentrating  5. Vision disorder      Assessment & Plan  Concussion without loss of consciousness, subsequent encounter    Orders:    SUMAtriptan (Imitrex) 25 mg tablet; Take 1 tablet (25 mg total) by mouth once as needed for migraine    Dizziness         Acute post-traumatic headache, not intractable    Orders:    SUMAtriptan (Imitrex) 25 mg tablet; Take 1 tablet (25 mg total) by mouth once as needed for migraine    Difficulty concentrating         Vision disorder         Plan:    Patient Instructions   F/u as needed  F/u  Neurology  Continue therapy  Continue exercising  Ibuprofen/excedrin PRN     Return in about 6 weeks (around 6/11/2025) for Recheck.      Subjective        Chief Complaint:   Concussion (Follow up)      HPI              Sheila Oakes is a 24 y.o. female who presents today for follow up of concussion    Going to PT at [facility]- vision therapy  Still having HA/vision issues  She is about the same  ibuprofen- helping more  Excedrin was helping- but out of meds  Exercising daily- no issues.      LThe current concussion symptom score is 8/22 including headache, visual problem, dizziness, sensitivity to light, sensitivity to noise, foggy, difficulty concentrating, and difficulty remembering  Do symptoms worsen with Physical Activity?  No  Do symptoms worsen with Cognitive Activity?  Yes HA  Overall Rating:  What percent is this person back to normal?  Patient 70 %    Review of Systems  Symptoms Checklist      Flowsheet Row Most Recent Value   Physical    Headache 1   Nausea 0   Vomiting 0   Balance  "problems 0   Dizziness 1   Visual problems 1   Fatigue 0   Sensitivity to light 1   Sensitivity to noise 1   Numbness / tingling 0   TOTAL PHYSICAL SCORE 5   Cognitive    Foggy 1   Slowed down 0   Difficulty concentrating 1   Difficulty remembering 1   TOTAL COGNITIVE SCORE 3   Emotional    Irritability 0   Sadness 0   More emotional 0   Nervousness 0   TOTAL EMOTIONAL SCORE 0   Sleep    Drowsiness 0   Sleeping less 0   Sleeping more 0   Difficulty falling asleep 0   TOTAL SLEEP SCORE 0   TOTAL SYMPTOM SCORE 8          Symptoms Checklist      Flowsheet Row Most Recent Value   Physical    Headache 1   Nausea 0   Vomiting 0   Balance problems 0   Dizziness 1   Visual problems 1   Fatigue 0   Sensitivity to light 1   Sensitivity to noise 1   Numbness / tingling 0   TOTAL PHYSICAL SCORE 5   Cognitive    Foggy 1   Slowed down 0   Difficulty concentrating 1   Difficulty remembering 1   TOTAL COGNITIVE SCORE 3   Emotional    Irritability 0   Sadness 0   More emotional 0   Nervousness 0   TOTAL EMOTIONAL SCORE 0   Sleep    Drowsiness 0   Sleeping less 0   Sleeping more 0   Difficulty falling asleep 0   TOTAL SLEEP SCORE 0   TOTAL SYMPTOM SCORE 8                 Objective      Pulse 98   Temp 99.6 °F (37.6 °C)   Ht 5' 6\" (1.676 m)   Wt 77.6 kg (171 lb)   SpO2 99%   BMI 27.60 kg/m²   Body mass index is 27.6 kg/m².   Patient Active Problem List   Diagnosis    Allergic rhinitis due to pollen    History of recurrent ear infection    CIARA (generalized anxiety disorder)    Annual physical exam    Adult ADHD    Neuralgia, geniculate    Chest tightness    Labyrinthitis of right ear    History of CT scan of head    Generalized abdominal pain    Other headache syndrome    Vitamin D deficiency    B12 deficiency    Other fatigue    Irritable bowel syndrome with diarrhea    COVID-19 long hauler    Syncope    History of COVID-19    Post concussion syndrome    Panic attacks        Meds/Allergies   Current Outpatient Medications on File " Prior to Visit   Medication Sig Dispense Refill    albuterol (ProAir HFA) 90 mcg/act inhaler Inhale 2 puffs every 4 (four) hours as needed for wheezing or shortness of breath 8.5 g 1    dicyclomine (BENTYL) 20 mg tablet Take 1 tablet (20 mg total) by mouth every 6 (six) hours as needed (abdominal cramping and diarrhea) 30 tablet 1    fexofenadine (ALLEGRA) 180 MG tablet Take 180 mg by mouth as needed      norgestimate-ethinyl estradiol (ORTHO TRI-CYCLEN,TRINESSA) 0.18/0.215/0.25 MG-35 MCG per tablet TAKE 1 TABLET BY MOUTH EVERY DAY 84 tablet 1    escitalopram (LEXAPRO) 5 mg tablet Take 1 tablet (5 mg total) by mouth daily (Patient not taking: Reported on 3/28/2025) 90 tablet 0    hydrOXYzine HCL (ATARAX) 25 mg tablet Take 1 tablet (25 mg total) by mouth every 6 (six) hours as needed for anxiety (insomnia) (Patient not taking: Reported on 4/30/2025) 30 tablet 1    lactase (LACTAID) 3,000 units tablet Take 1 tablet (3,000 Units total) by mouth 3 (three) times a day with meals (Patient not taking: Reported on 4/30/2025) 90 tablet 0    meclizine (ANTIVERT) 25 mg tablet Take 1 tablet (25 mg total) by mouth 3 (three) times a day as needed for dizziness (Patient not taking: Reported on 4/30/2025) 30 tablet 0    neomycin-polymyxin-hydrocortisone (CORTISPORIN) otic solution Administer 4 drops into both ears 4 (four) times a day (Patient not taking: Reported on 4/30/2025) 10 mL 0    ondansetron (ZOFRAN) 4 mg tablet Take 1 tablet (4 mg total) by mouth every 8 (eight) hours as needed for nausea or vomiting (Patient not taking: Reported on 4/30/2025) 20 tablet 0     Current Facility-Administered Medications on File Prior to Visit   Medication Dose Route Frequency Provider Last Rate Last Admin    [DISCONTINUED] sodium chloride 0.9 % infusion  20 mL Intravenous  Generic External Data Provider          Allergies   Allergen Reactions    Mupirocin      tongue swollen    Iv Contrast [Iodinated Contrast Media] Hives     Patient  developed a few hives on the face/neck after IV contrast. No other associated symptoms.     Sulfa Antibiotics Rash          The following portions of the patient's history were reviewed and updated as appropriate: allergies, current medications, past family history, past medical history, past social history, past surgical history, and problem list.  PHQ-A Screening            Past Medical History:   Diagnosis Date    Anxiety     Bilateral ovarian cysts     Kidney stones      Past Surgical History:   Procedure Laterality Date    ADENOIDECTOMY      TONSILECTOMY AND ADNOIDECTOMY      TONSILLECTOMY      TYMPANOSTOMY      TYMPANOSTOMY TUBE PLACEMENT      6 sets - last time as a child    WISDOM TOOTH EXTRACTION       Family History   Problem Relation Age of Onset    Fibroids Mother     GI problems Father     Seizures Sister     Arthritis Maternal Grandmother        Social History   Social Drivers of Health     Tobacco Use: Low Risk  (4/30/2025)    Patient History     Smoking Tobacco Use: Never     Smokeless Tobacco Use: Never     Passive Exposure: Never   Alcohol Use: Not At Risk (6/12/2021)    AUDIT-C     Frequency of Alcohol Consumption: 2-4 times a month     Average Number of Drinks: 1 or 2     Frequency of Binge Drinking: Never   Financial Resource Strain: Not on file   Food Insecurity: Not on file   Transportation Needs: Not on file   Physical Activity: Not on file   Stress: Not on file   Social Connections: Not on file   Intimate Partner Violence: Not on file   Depression: Not at risk (2/17/2025)    PHQ-2     PHQ-2 Score: 0   Housing Stability: Not on file   Utilities: Not on file   Health Literacy: Not on file      Social History     Substance and Sexual Activity   Alcohol Use Yes    Comment: socially      Social History     Substance and Sexual Activity   Drug Use No     Social History     Tobacco Use   Smoking Status Never    Passive exposure: Never   Smokeless Tobacco Never           Physical Exam   Physical  Exam   Ortho Exam    General:   No apparent distress:  yes    Psych:   AAOX3: yes   Mood and Affect:  Normal    HEENT:   PEERLA: yes   EOM pain: No   EOM nystagmus: No    Neuro:   Convergence:  Normal  11 cm   Finger to nose:  Normal   Dysdiadokinesia: No   Examination of Coordination:  Normal   CNII - XII Intact:  yes    Vestibular Ocular:    Gaze stability: Abnormal:   Nystagmus with verticle motion, Nystagmus with horizontal motion, Dizziness with verticle motion, Dizziness with horizontal motion, and slowing    Modified Balance Error Scoring System (M-KORY) 10 seconds each.  Single leg stance:  4 error(s).  Tandem stance:  1 error(s).    Cervical spine:  Mid-line tenderness: No  Tinel's positive over greater occipital nerve: No  ROM full: yes  Strength UE: Normal  Reflexes:     Symmetric bilateral patellar tendon: Normal             ImPACT Neurocognitive Test Interpretation:   Amos Duval MD

## 2025-04-30 NOTE — LETTER
April 30, 2025     Patient: Sheila Oakes  YOB: 2000  Date of Visit: 4/30/2025      To Whom it May Concern:    Sheila Oakes is under my professional care. Sheila was seen in my office on 4/30/2025. Please allow Sheila to work from home because of medical condition I am treating her for.    If you have any questions or concerns, please don't hesitate to call.         Sincerely,          Amos Duval MD        CC: No Recipients

## 2025-05-05 ENCOUNTER — TELEPHONE (OUTPATIENT)
Age: 25
End: 2025-05-05

## 2025-05-05 ENCOUNTER — PATIENT MESSAGE (OUTPATIENT)
Dept: FAMILY MEDICINE CLINIC | Facility: CLINIC | Age: 25
End: 2025-05-05

## 2025-05-05 ENCOUNTER — TELEPHONE (OUTPATIENT)
Dept: CARDIOLOGY CLINIC | Facility: CLINIC | Age: 25
End: 2025-05-05

## 2025-05-05 DIAGNOSIS — R55 SYNCOPE, UNSPECIFIED SYNCOPE TYPE: Primary | ICD-10-CM

## 2025-05-05 DIAGNOSIS — I10 HYPERTENSION, UNSPECIFIED TYPE: ICD-10-CM

## 2025-05-05 RX ORDER — LABETALOL 100 MG/1
100 TABLET, FILM COATED ORAL 2 TIMES DAILY
Qty: 60 TABLET | Refills: 3 | Status: SHIPPED | OUTPATIENT
Start: 2025-05-05

## 2025-05-05 NOTE — TELEPHONE ENCOUNTER
- Received message from patient with shaky feeling, tachycardia and elevated blood pressure readings.  I was able to call and discussed with patient and recommended ER evaluation.  Patient wished to go via private vehicle and have somebody drive her but did not wish EMS to call.  After discussion with patient she notes that blood pressures at home been elevated in like the 140s 150s range at times therefore we will trial labetalol 100 mg twice daily.  I have also given referral to endocrinology for secondary hypertension workup, will check renal artery ultrasound and will have patient continue to monitor labetalol dosing.  I have also given referral for tilt testing which will help with patient's concern for possible POTS evaluation.  Continue to monitor and follow-up ER evaluation.

## 2025-05-05 NOTE — TELEPHONE ENCOUNTER
"Hello,    The following message was sent via e-mail to the leadership team:     Please advise if you can help facilitate the following overbook request:    Patient Name: Sheila Oakes    Patient MRN: [id number]     Call back #: [phone]    Insurance: [employer]    Department:Cardiology    Specialty: General Cardiology    Reason for overbook request: PATIENT REQUEST    Comments (Write \"N/a\" if no comments): Patient is currently established with Dr. Holloway.  She has a diagnosis of POTS. She is requesting an appt with Dr. Foote for a second opinion.  First available appt with Dr. Foote scheduled on 6/2/26 in the [facility].  Appt on wait list.  Patient requesting to be seen sooner by Dr. Foote at any location.    Requested doctor and location: Dr. Foote/Any location    Date of current appointment: 6/2/26      Thank you.      "

## 2025-05-06 ENCOUNTER — CONSULT (OUTPATIENT)
Dept: ENDOCRINOLOGY | Facility: CLINIC | Age: 25
End: 2025-05-06
Payer: COMMERCIAL

## 2025-05-06 VITALS
TEMPERATURE: 98.3 F | DIASTOLIC BLOOD PRESSURE: 70 MMHG | HEIGHT: 66 IN | OXYGEN SATURATION: 99 % | WEIGHT: 168.8 LBS | HEART RATE: 93 BPM | SYSTOLIC BLOOD PRESSURE: 118 MMHG | RESPIRATION RATE: 18 BRPM | BODY MASS INDEX: 27.13 KG/M2

## 2025-05-06 DIAGNOSIS — R06.83 SNORING: ICD-10-CM

## 2025-05-06 DIAGNOSIS — R63.5 ABNORMAL WEIGHT GAIN: ICD-10-CM

## 2025-05-06 DIAGNOSIS — F07.81 POST CONCUSSION SYNDROME: ICD-10-CM

## 2025-05-06 DIAGNOSIS — I10 HYPERTENSION, UNSPECIFIED TYPE: Primary | ICD-10-CM

## 2025-05-06 PROCEDURE — 99243 OFF/OP CNSLTJ NEW/EST LOW 30: CPT | Performed by: NURSE PRACTITIONER

## 2025-05-06 RX ORDER — DEXAMETHASONE 1 MG
1 TABLET ORAL ONCE
Qty: 1 TABLET | Refills: 0 | Status: SHIPPED | OUTPATIENT
Start: 2025-05-06 | End: 2025-05-06

## 2025-05-06 NOTE — PATIENT INSTRUCTIONS
Step 1: Hold evening labetaolol and complete aldosterone/renin, prolactin, IGF-1, TSH and free T4  Take 1 mg of dexamethasone at 11 pm prior to completing morning Cortsiol- must be fasting between hours of 7-9pm.
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
## 2025-05-06 NOTE — ASSESSMENT & PLAN NOTE
MRI of brain unremarkable for pituitary lesion. Check IGF-1 and prolactin for completeness. Prolactin may be elevated as patient is taking FAYE.   Orders:    Prolactin    Insulin-like growth factor 1 (IGF-1); Future

## 2025-05-07 ENCOUNTER — HOSPITAL ENCOUNTER (OUTPATIENT)
Dept: NON INVASIVE DIAGNOSTICS | Facility: CLINIC | Age: 25
Discharge: HOME | End: 2025-05-07
Payer: COMMERCIAL

## 2025-05-07 ENCOUNTER — RESULTS FOLLOW-UP (OUTPATIENT)
Dept: NON INVASIVE DIAGNOSTICS | Facility: HOSPITAL | Age: 25
End: 2025-05-07

## 2025-05-07 ENCOUNTER — APPOINTMENT (OUTPATIENT)
Dept: LAB | Facility: CLINIC | Age: 25
End: 2025-05-07
Payer: COMMERCIAL

## 2025-05-07 DIAGNOSIS — F07.81 POST CONCUSSION SYNDROME: ICD-10-CM

## 2025-05-07 DIAGNOSIS — I10 HYPERTENSION, UNSPECIFIED TYPE: ICD-10-CM

## 2025-05-07 DIAGNOSIS — R63.5 ABNORMAL WEIGHT GAIN: ICD-10-CM

## 2025-05-07 LAB
PROLACTIN SERPL-MCNC: 14.78 NG/ML (ref 3.34–26.72)
T4 FREE SERPL-MCNC: 0.67 NG/DL (ref 0.61–1.12)
TSH SERPL DL<=0.05 MIU/L-ACNC: 2.15 UIU/ML (ref 0.45–4.5)

## 2025-05-07 PROCEDURE — 93975 VASCULAR STUDY: CPT

## 2025-05-07 PROCEDURE — 84244 ASSAY OF RENIN: CPT

## 2025-05-07 PROCEDURE — 84305 ASSAY OF SOMATOMEDIN: CPT

## 2025-05-07 PROCEDURE — 36415 COLL VENOUS BLD VENIPUNCTURE: CPT | Performed by: NURSE PRACTITIONER

## 2025-05-07 PROCEDURE — 84439 ASSAY OF FREE THYROXINE: CPT | Performed by: NURSE PRACTITIONER

## 2025-05-07 PROCEDURE — 84443 ASSAY THYROID STIM HORMONE: CPT | Performed by: NURSE PRACTITIONER

## 2025-05-07 PROCEDURE — 82088 ASSAY OF ALDOSTERONE: CPT

## 2025-05-07 PROCEDURE — 84146 ASSAY OF PROLACTIN: CPT | Performed by: NURSE PRACTITIONER

## 2025-05-07 PROCEDURE — 93975 VASCULAR STUDY: CPT | Performed by: SURGERY

## 2025-05-08 ENCOUNTER — TRANSCRIBE ORDERS (OUTPATIENT)
Dept: SLEEP CENTER | Facility: CLINIC | Age: 25
End: 2025-05-08

## 2025-05-08 ENCOUNTER — TELEPHONE (OUTPATIENT)
Dept: NEUROLOGY | Facility: CLINIC | Age: 25
End: 2025-05-08

## 2025-05-08 DIAGNOSIS — R06.83 SNORING: Primary | ICD-10-CM

## 2025-05-09 ENCOUNTER — PATIENT MESSAGE (OUTPATIENT)
Dept: FAMILY MEDICINE CLINIC | Facility: CLINIC | Age: 25
End: 2025-05-09

## 2025-05-11 LAB — IGF-I SERPL-MCNC: 137 NG/ML (ref 101–347)

## 2025-05-12 ENCOUNTER — APPOINTMENT (OUTPATIENT)
Dept: LAB | Facility: CLINIC | Age: 25
End: 2025-05-12
Payer: COMMERCIAL

## 2025-05-12 LAB
ALDOST SERPL-MCNC: 18.5 NG/DL (ref 0–30)
ALDOST/RENIN PLAS-RTO: 12 {RATIO} (ref 0–30)
CORTIS AM PEAK SERPL-MCNC: 1.5 UG/DL (ref 6.7–22.6)
RENIN PLAS-CCNC: 1.54 NG/ML/HR (ref 0.17–5.38)

## 2025-05-12 PROCEDURE — 36415 COLL VENOUS BLD VENIPUNCTURE: CPT | Performed by: NURSE PRACTITIONER

## 2025-05-12 PROCEDURE — 82533 TOTAL CORTISOL: CPT | Performed by: NURSE PRACTITIONER

## 2025-05-14 ENCOUNTER — OFFICE VISIT (OUTPATIENT)
Dept: NEUROLOGY | Facility: CLINIC | Age: 25
End: 2025-05-14
Payer: COMMERCIAL

## 2025-05-14 VITALS
DIASTOLIC BLOOD PRESSURE: 66 MMHG | OXYGEN SATURATION: 99 % | BODY MASS INDEX: 27.64 KG/M2 | HEIGHT: 66 IN | HEART RATE: 67 BPM | WEIGHT: 172 LBS | SYSTOLIC BLOOD PRESSURE: 128 MMHG | TEMPERATURE: 98 F

## 2025-05-14 DIAGNOSIS — F07.81 POST CONCUSSIVE SYNDROME: ICD-10-CM

## 2025-05-14 DIAGNOSIS — R42 DIZZINESS: ICD-10-CM

## 2025-05-14 DIAGNOSIS — R55 SYNCOPE, UNSPECIFIED SYNCOPE TYPE: Primary | ICD-10-CM

## 2025-05-14 PROCEDURE — 99214 OFFICE O/P EST MOD 30 MIN: CPT | Performed by: NURSE PRACTITIONER

## 2025-05-14 NOTE — PATIENT INSTRUCTIONS
Additional Testing: MRA head and neck     Continue follow-up with Cardiology     Follow-up with Concussion clinic       -Over the counter preventive supplements for headaches/migraines (if you try, try for 3 months straight):  -Magnesium 400mg daily (If any diarrhea or upset stomach, decrease dose as tolerated)  -Riboflavin (Vitamin B2) 400mg daily (may make your urine bright/neon yellow)  - All supplements can be purchased online    Abortive: For immediate treatment of a headache/migraine:  -Continue Imitrex 100 mg at the earliest onset of a headache.  May repeat again in 2 hours if not completely headache free.  No more than 2 within 24 hours.  -It is ok to take ibuprofen, acetaminophen or naproxen (Advil, Tylenol,  Aleve, Excedrin) if they help your headaches you should limit these to No more than 3 times a week to avoid medication overuse/rebound headaches.     Lifestyle Recommendations:  -Remain well-hydrated drinking at least 48 to 64 ounces of noncaffeinated beverages per day in addition to anything caffeinated.    -It is important to eat meals throughout the day and not go long periods of time between eating.  -Getting adequate rest is also very important for migraine prevention (aim for 7-8 hours per night).     -Regular exercise is also beneficial for headache prevention.  I would encourage at the least 5 days of physical exercise weekly for at least 30 minutes.   -I would like for them to keep track of their migraines using an application on their phone or calendar as they see fit. Phone applications: Migraine Linden or Migraine Diary.

## 2025-05-14 NOTE — PROGRESS NOTES
Name: Sheila aOkes      : 2000      MRN: [id number]  Encounter Provider: PABLITO Andrade  Encounter Date: 2025   Encounter department: [facility]OLOG[facility]  :  Assessment & Plan  Post concussive syndrome  Continue follow-up with Ortho concussion clinic  Referral placed for transfer to Neurology concussion specialist for management of patient's ongoing post concussion symptoms.   Recommend to add Magnesium 400 mg and Riboflavin 400mg supplements for headache prevention   Continue Imitrex and NSAIDs as needed for abortive treatment.  Limit use of NSAIDs to less than 3 times a week as medication overuse headaches can occur  Lifestyle modifications were also discussed including remaining well-hydrated, eating regular meals throughout the day, getting adequate sleep and regular exercise.  Syncope, unspecified syncope type  24 y.o.  right handed female, with ADHD and anxiety disorder who is presenting to [facility] for follow-up of a possible seizure vs syncope vs POTS.  Neurological exam is normal.  Head CT was unremarkable. Given the prodromal feelings of vision closing in, dizziness, pale, and feeling like she was going to pass out, her recent event is most consistent with a syncopal episode, specifically convulsive syncope.  She should continue following up with Cardiology and complete the already ordered tilt table test to evaluate for POTS.  Will also order MRA of patient's head and neck to evaluate cerebral vasculature and rule out vertebrobasilar insufficiency due to patient's dizziness.  Note, she is unable to undergo CTA head and neck due to contrast dye allergy.  Consider obtaining EEG if tilt table test is nondiagnostic.    Follow-up: 3 months         History of Present Illness   Sheila Oakes is a 24 y.o.  right handed female, with ADHD and anxiety disorder who is presenting to [facility] for evaluation of a syncopal episode.      For review:    Patient was at a  office on 9/27/24 with her friend. She was sitting in a chair alongside of her friend, when she experienced visual changes of seeming like the door was coming towards her. She felt like she was going to pass out, was dizzy,  pale, then passed out, dropping her phone. She did hit her head on the wall sustaining a concussion. She woke up drenched in sweat with numbness and tingling in her feet. Patient estimates that she was unresponsive for about 45 seconds to 1 minute. Her friend did report whole body shaking during the episode. No tongue biting or incontinence. No post ictal phase or confusion. No immediate precipitating factors were identified. However, she did have some diarrhea the previous week and had not eaten that morning.     There is one other instance where she passed out. This occurred about 6-7 years ago when she was having labs drawn. No history of seizures.     She is being followed by Ortho Concussion clinic and will be starting OT for her continued vision difficulties.Patient continues to experience headaches and light sensitivity, but has returned to work (works remotely).     She is also seeing Cardiology where a stress test, event recorder and TTE are pending. EKGs have shown shortened GA interval.      Interval History:   Since the initial office visit on 11/1/24, patient has not experienced any further episodes of passing out.  She has experienced some presyncopal feelings described as feeling flushed, hot and heart racing.  This caused her to present to the emergency room on April 29th.  Patient has followed up with Cardiology where workup so far has been unremarkable.  A tilt table test is pending to evaluate for POTS.    Patient continues to follow-up with Ortho concussion clinic and is receiving vision therapy.  However, she continues to experience postconcussion symptoms, mainly dizziness, blurry vision and difficulty concentrating.  Headaches also continue  to occur about 3-4 times a month.  She was prescribed Imitrex which does help abort her headaches.  She is asking to have her FMLA updated to allow for 4 days of her month due to her above symptoms.    Patient's PCP feels her symptoms are related to anxiety and has prescribed Lexapro.  However, patient never started taking this medication and never took the hydroxyzine.  She does not feel like she is super anxious, but does acknowledge some anxiety relating to her continued presyncopal events.      Current Antiepileptic Medications:  1. none  Other medications as per Epic      Event/Seizure Semiology:  1.  [place]otia like passing out, dizzy, pale, lost consciousness, drenched in sweat, lasted <1 min. No tongue biting, incontinence or post ictal period.     Special Features:  Status epilepticus: none  Self Injury Seizures: none  Precipitating Factors: none      Epilepsy Risk Factors:   Abnormal pregnancy: no     Abnormal birth/: no    Abnormal Development: no    Febrile seizures, simple: no    Febrile seizures, complex: no    CNS infection: no    Intellectual disability: no     Cerebral palsy:  no    Head injury (moderate/severe): no  CNS neoplasm: no     CNS malformation: no     Neurosurgical procedure: no    Stroke:  no     Alcohol abuse: no    Drug abuse: no     Family history Sz/epilepsy: sister has seizures  Prior physical/sexual/emotional abuse: no     Prior AEDs:  none    Prior Evaluation:  -HCT 24- No acute intracranial hemorrhage, mass effect or midline shift    Psychiatric History:  Depression: no  Anxiety: yes  Psychosis: no  Psychiatric Admissions: no    Medical History:  ADHD   Anxiety       Social History:  Driving: yes  Working: remotely for [employer]-     I reviewed Allergies, Medical History, Surgical History and Family History as documented in Epic/Care Everywhere       Objective   /66 (BP Location: Left arm, Patient Position: Sitting, Cuff Size: Adult)   Pulse 67  "  Temp 98 °F (36.7 °C) (Temporal)   Ht 5' 6\" (1.676 m)   Wt 78 kg (172 lb)   SpO2 99%   BMI 27.76 kg/m²      General Exam  In general, patient is well appearing and in no distress.    Neurologic Exam  Mental Status: Alert and oriented x 3  Language: normal fluency and comprehension  Cranial Nerves:  PERRL, EOMI, no nystagmus, Face symmetric, Tongue/palate midline, No dysarthria   Motor: No pronator drift. Normal bulk and tone. Strength 5/5 throughout  Coordination: Finger to nose intact  Gait: normal casual gait, able to tandem walk without difficulty  Romberg negative     Administrative Statements     Voice recognition software was used in the generation of this note. There may be unintentional errors including grammatical errors, spelling errors, or pronoun errors.   "

## 2025-05-15 ENCOUNTER — TELEPHONE (OUTPATIENT)
Age: 25
End: 2025-05-15

## 2025-05-15 NOTE — TELEPHONE ENCOUNTER
Caller: Patient    Doctor: Dr. Duval    Reason for call: Patient called has questions if Dr. Duval is able to complete [facility] paperwork for concussion. Please advise.    Call back#: [phone]

## 2025-05-16 ENCOUNTER — RESULTS FOLLOW-UP (OUTPATIENT)
Dept: ENDOCRINOLOGY | Facility: CLINIC | Age: 25
End: 2025-05-16

## 2025-05-16 DIAGNOSIS — R55 SYNCOPE, UNSPECIFIED SYNCOPE TYPE: Primary | ICD-10-CM

## 2025-05-16 DIAGNOSIS — F41.0 PANIC ATTACKS: ICD-10-CM

## 2025-05-19 NOTE — ASSESSMENT & PLAN NOTE
24 y.o.  right handed female, with ADHD and anxiety disorder who is presenting to [facility] for follow-up of a possible seizure vs syncope vs POTS.  Neurological exam is normal.  Head CT was unremarkable. Given the prodromal feelings of vision closing in, dizziness, pale, and feeling like she was going to pass out, her recent event is most consistent with a syncopal episode, specifically convulsive syncope.  She should continue following up with Cardiology and complete the already ordered tilt table test to evaluate for POTS.  Will also order MRA of patient's head and neck to evaluate cerebral vasculature and rule out vertebrobasilar insufficiency due to patient's dizziness.  Note, she is unable to undergo CTA head and neck due to contrast dye allergy.  Consider obtaining EEG if tilt table test is nondiagnostic.

## 2025-05-27 ENCOUNTER — OFFICE VISIT (OUTPATIENT)
Dept: CARDIOLOGY CLINIC | Facility: CLINIC | Age: 25
End: 2025-05-27
Payer: COMMERCIAL

## 2025-05-27 VITALS
SYSTOLIC BLOOD PRESSURE: 142 MMHG | HEIGHT: 66 IN | BODY MASS INDEX: 27.97 KG/M2 | HEART RATE: 116 BPM | DIASTOLIC BLOOD PRESSURE: 86 MMHG | WEIGHT: 174 LBS

## 2025-05-27 DIAGNOSIS — R00.2 PALPITATIONS: ICD-10-CM

## 2025-05-27 DIAGNOSIS — I10 HYPERTENSION, UNSPECIFIED TYPE: ICD-10-CM

## 2025-05-27 DIAGNOSIS — R55 SYNCOPE, UNSPECIFIED SYNCOPE TYPE: Primary | ICD-10-CM

## 2025-05-27 DIAGNOSIS — E66.3 OVERWEIGHT (BMI 25.0-29.9): ICD-10-CM

## 2025-05-27 PROCEDURE — 99214 OFFICE O/P EST MOD 30 MIN: CPT | Performed by: INTERNAL MEDICINE

## 2025-05-27 RX ORDER — LABETALOL 100 MG/1
100 TABLET, FILM COATED ORAL 2 TIMES DAILY
Qty: 180 TABLET | Refills: 2 | OUTPATIENT
Start: 2025-05-27

## 2025-05-27 RX ORDER — LABETALOL 200 MG/1
200 TABLET, FILM COATED ORAL 2 TIMES DAILY
Qty: 60 TABLET | Refills: 8 | Status: SHIPPED | OUTPATIENT
Start: 2025-05-27

## 2025-05-27 NOTE — ASSESSMENT & PLAN NOTE
- Possibly vasovagal by story without recurrence in September 2024  -Continue to monitor  -Follow-up tilt testing

## 2025-05-27 NOTE — PROGRESS NOTES
Patient ID: Sheila Oakes is a 24 y.o. female.        Plan:      Assessment & Plan  Hypertension, unspecified type  - Counseled patient on dietary lifestyle modifications  - Will increase labetalol to 200 mg twice daily and monitor response  Syncope, unspecified syncope type  - Possibly vasovagal by story without recurrence in September 2024  -Continue to monitor  -Follow-up tilt testing  Palpitations  - Improving with initiation of labetalol therapy will monitor with up titration to 200 mg twice daily   -continue to monitor    Overweight (BMI 25.0-29.9)  - Counseled patient on dietary and lifestyle modifications   -continue to monitor        Follow up Plan/Other summary comments:  - As blood pressure readings from home still at times somewhat elevated although trending in better direction will increase labetalol to 200 mg twice daily and patient will monitor response  - Patient will monitor home blood pressure readings let her office know significantly elevated greater than 130/80's MHG for further up titration medical therapy  - Will follow-up tilt results and patient is going to be looking into POTS specialist to assist but is also working with neurology for possible postconcussive issues as well.  - Will follow-up complete secondary workup by endocrinology for elevated blood pressure readings  - Patient will also be following up with her psychiatric team for assistance with additional symptomatology as well  - I will see patient in 6 months or sooner if necessary  - Patient counseled if she were to have any warning or alarm type symptoms she is to seek emergency medical care immediately    HPI:   - Patient is a 24-year-old female with seasonal allergies, documented ADHD, anxiety, history of COVID-19 and had episode of syncope while helping her friend at veterinary clinic.  She presents to the office originally in October 2024 for the symptoms and orthostatic vital signs performed at that time were  relatively unrevealing.  Patient over the course of the last several months underwent several different testing regimens and while these are relatively unrevealing continued to have symptomatology.  While she has not had significant recurrence of loss of consciousness she does have episodes of racing heart rates and elevated blood pressure readings at various strange times throughout the day which do not necessarily seem to be exertional in nature or have a specific trigger that she is aware of.  - With patient's elevated blood pressure readings she was initiated on labetalol 100 mg twice daily which did seem to improve her blood pressures but not completely resolve her symptomatology.  - Currently in the office today she denies any active chest pain, palpitations, lightheadedness or dizziness, loss conscious, shortness of breath, lower extremity edema, orthopnea or bendopnea.  Patient has not had recurrence of loss of consciousness in September 2024.    Most recent or relevant cardiac/vascular testing:    - Stress ECG 4/22/2025 showing no diagnostic evidence of ischemia and no significant arrhythmias during stress testing with patient able to exercise on treadmill for 9 minutes and 51 seconds.      - Transthoracic echocardiogram 4/20/2025 showing left-ventricular systolic function normal estimated LVEF 65% with normal diastolic parameters, trace mitral regurgitation trace pulmonic regurgitation with IVC normal in size      - Ambulatory event monitor 3/5/2025 showing predominantly sinus rhythm average heart rate 92 bpm (ranging  bpm).  There was no significant evidence of atrial fibrillation, supraventricular tachycardia, ventricular tachycardia, pauses or advanced heart block on monitor.      Past Surgical History[1]    Review of Systems   Review of Systems   Constitutional:  Negative for chills, fatigue and fever.   HENT:  Negative for trouble swallowing and voice change.    Eyes:  Negative for pain and  "redness.   Respiratory:  Negative for shortness of breath and wheezing.    Cardiovascular:  Negative for chest pain, palpitations and leg swelling.   Gastrointestinal:  Negative for abdominal pain, blood in stool, constipation, diarrhea, nausea and vomiting.   Genitourinary:  Negative for dysuria.   Musculoskeletal:  Negative for neck pain and neck stiffness.   Skin:  Negative for rash.   Neurological:  Negative for dizziness, syncope, light-headedness and headaches.   Psychiatric/Behavioral:  Negative for agitation and confusion.    All other systems reviewed and are negative.         Objective:     /86   Pulse (!) 116   Ht 5' 6\" (1.676 m)   Wt 78.9 kg (174 lb)   BMI 28.08 kg/m²     PHYSICAL EXAM:  Physical Exam  Vitals reviewed.   Constitutional:       General: She is not in acute distress.     Appearance: Normal appearance. She is not diaphoretic.   HENT:      Head: Normocephalic and atraumatic.     Eyes:      General:         Right eye: No discharge.         Left eye: No discharge.     Neck:      Comments: Trachea midline, no significant JVD appreciated  Cardiovascular:      Rate and Rhythm: Normal rate and regular rhythm.      Heart sounds:      No friction rub.   Pulmonary:      Effort: Pulmonary effort is normal. No respiratory distress.      Breath sounds: No wheezing.   Chest:      Chest wall: No tenderness.   Abdominal:      General: There is no distension.      Palpations: Abdomen is soft.      Tenderness: There is no abdominal tenderness. There is no rebound.     Musculoskeletal:      Right lower leg: No edema.      Left lower leg: No edema.     Skin:     General: Skin is warm and dry.     Neurological:      Mental Status: She is alert.      Comments: Awake, alert, able to answer questions appropriately, able to move extremities bilaterally.   Psychiatric:         Mood and Affect: Mood normal.         Behavior: Behavior normal.          Meds reviewed.  Medications Ordered Prior to Encounter[2] "   Past Medical History[3]        Tobacco Use History[4]    Family History[5]             [1]   Past Surgical History:  Procedure Laterality Date    ADENOIDECTOMY      TONSILECTOMY AND ADNOIDECTOMY      TONSILLECTOMY      TYMPANOSTOMY      TYMPANOSTOMY TUBE PLACEMENT      6 sets - last time as a child    WISDOM TOOTH EXTRACTION     [2]   Current Outpatient Medications on File Prior to Visit   Medication Sig Dispense Refill    albuterol (ProAir HFA) 90 mcg/act inhaler Inhale 2 puffs every 4 (four) hours as needed for wheezing or shortness of breath 8.5 g 1    labetalol (NORMODYNE) 100 mg tablet Take 1 tablet (100 mg total) by mouth 2 (two) times a day 60 tablet 3    meclizine (ANTIVERT) 25 mg tablet Take 1 tablet (25 mg total) by mouth 3 (three) times a day as needed for dizziness 30 tablet 0    norgestimate-ethinyl estradiol (ORTHO TRI-CYCLEN,TRINESSA) 0.18/0.215/0.25 MG-35 MCG per tablet TAKE 1 TABLET BY MOUTH EVERY DAY 84 tablet 1    SUMAtriptan (Imitrex) 25 mg tablet Take 1 tablet (25 mg total) by mouth once as needed for migraine 10 tablet 1    dicyclomine (BENTYL) 20 mg tablet Take 1 tablet (20 mg total) by mouth every 6 (six) hours as needed (abdominal cramping and diarrhea) (Patient not taking: Reported on 5/14/2025) 30 tablet 1    fexofenadine (ALLEGRA) 180 MG tablet Take 180 mg by mouth as needed (Patient not taking: Reported on 5/27/2025)      hydrOXYzine HCL (ATARAX) 25 mg tablet Take 1 tablet (25 mg total) by mouth every 6 (six) hours as needed for anxiety (insomnia) (Patient not taking: Reported on 5/14/2025) 30 tablet 1    ondansetron (ZOFRAN) 4 mg tablet Take 1 tablet (4 mg total) by mouth every 8 (eight) hours as needed for nausea or vomiting (Patient not taking: Reported on 5/14/2025) 20 tablet 0     No current facility-administered medications on file prior to visit.   [3]   Past Medical History:  Diagnosis Date    Anxiety     Bilateral ovarian cysts     Kidney stones    [4]   Social  History  Tobacco Use   Smoking Status Never    Passive exposure: Never   Smokeless Tobacco Never   [5]   Family History  Problem Relation Name Age of Onset    Fibroids Mother      GI problems Father      Seizures Sister Virginie     Arthritis Maternal Grandmother

## 2025-05-27 NOTE — ASSESSMENT & PLAN NOTE
- Improving with initiation of labetalol therapy will monitor with up titration to 200 mg twice daily   -continue to monitor

## 2025-06-17 PROBLEM — R55 SYNCOPE: Chronic | Status: ACTIVE | Noted: 2024-10-30

## 2025-06-17 PROBLEM — I10 PRIMARY HYPERTENSION: Status: ACTIVE | Noted: 2025-05-29

## 2025-06-17 PROBLEM — F41.1 GAD (GENERALIZED ANXIETY DISORDER): Chronic | Status: ACTIVE | Noted: 2022-10-22

## 2025-06-17 PROBLEM — D50.8 IRON DEFICIENCY ANEMIA SECONDARY TO INADEQUATE DIETARY IRON INTAKE: Chronic | Status: ACTIVE | Noted: 2025-06-17

## 2025-06-17 PROBLEM — E83.40 DISORDERS OF MAGNESIUM METABOLISM, UNSPECIFIED: Status: ACTIVE | Noted: 2025-06-17

## 2025-06-17 PROBLEM — G72.49: Status: ACTIVE | Noted: 2025-06-17

## 2025-06-17 PROBLEM — I10 PRIMARY HYPERTENSION: Chronic | Status: ACTIVE | Noted: 2025-05-29

## 2025-06-17 PROBLEM — D50.8 IRON DEFICIENCY ANEMIA SECONDARY TO INADEQUATE DIETARY IRON INTAKE: Status: ACTIVE | Noted: 2025-06-17

## 2025-06-17 PROBLEM — R53.83 OTHER FATIGUE: Chronic | Status: ACTIVE | Noted: 2023-11-17

## 2025-06-17 PROBLEM — Z87.39 HISTORY OF BACK PAIN: Status: ACTIVE | Noted: 2020-12-04

## 2025-06-17 PROBLEM — S56.912A: Status: ACTIVE | Noted: 2019-04-25

## 2025-06-17 PROBLEM — D52.8 OTHER FOLATE DEFICIENCY ANEMIAS: Status: ACTIVE | Noted: 2025-06-17

## 2025-06-17 PROBLEM — Z00.00 ANNUAL PHYSICAL EXAM: Chronic | Status: ACTIVE | Noted: 2022-11-04

## 2025-06-17 PROBLEM — M94.0 COSTAL CHONDRITIS: Status: ACTIVE | Noted: 2020-12-04

## 2025-06-17 PROBLEM — G72.49: Chronic | Status: ACTIVE | Noted: 2025-06-17

## 2025-06-17 PROBLEM — N25.81 SECONDARY HYPERPARATHYROIDISM, RENAL (HCC): Status: ACTIVE | Noted: 2025-06-17

## 2025-06-17 PROBLEM — M25.511 PAIN IN JOINT OF RIGHT SHOULDER: Status: ACTIVE | Noted: 2020-12-04

## 2025-06-17 PROBLEM — F07.81 POST CONCUSSION SYNDROME: Chronic | Status: ACTIVE | Noted: 2024-10-30

## 2025-06-17 PROBLEM — R55 SYNCOPE AND COLLAPSE: Status: ACTIVE | Noted: 2025-05-29

## 2025-06-17 PROBLEM — Z79.899 ON LONG TERM DRUG THERAPY: Status: ACTIVE | Noted: 2025-06-17

## 2025-06-17 PROBLEM — R00.2 PALPITATIONS: Chronic | Status: ACTIVE | Noted: 2025-05-27

## 2025-06-17 PROBLEM — E55.9 VITAMIN D DEFICIENCY: Chronic | Status: ACTIVE | Noted: 2023-11-17

## 2025-06-17 PROBLEM — G90.89: Chronic | Status: ACTIVE | Noted: 2025-06-17

## 2025-06-17 PROBLEM — R00.0 SINUS TACHYCARDIA: Chronic | Status: ACTIVE | Noted: 2025-05-29

## 2025-06-17 PROBLEM — R07.9 CHEST PAIN: Status: ACTIVE | Noted: 2020-12-04

## 2025-06-17 PROBLEM — Z79.899 ON LONG TERM DRUG THERAPY: Chronic | Status: ACTIVE | Noted: 2025-06-17

## 2025-06-17 PROBLEM — D52.8 OTHER FOLATE DEFICIENCY ANEMIAS: Chronic | Status: ACTIVE | Noted: 2025-06-17

## 2025-06-17 PROBLEM — G90.89: Status: ACTIVE | Noted: 2025-06-17

## 2025-06-17 PROBLEM — R73.01 IMPAIRED FASTING GLUCOSE: Chronic | Status: ACTIVE | Noted: 2025-06-17

## 2025-06-17 PROBLEM — E78.00 PURE HYPERCHOLESTEROLEMIA: Status: ACTIVE | Noted: 2025-06-17

## 2025-06-17 PROBLEM — R94.6 ABNORMAL RESULTS OF THYROID FUNCTION STUDIES: Status: ACTIVE | Noted: 2025-06-17

## 2025-06-17 PROBLEM — N25.81 SECONDARY HYPERPARATHYROIDISM, RENAL (HCC): Chronic | Status: ACTIVE | Noted: 2025-06-17

## 2025-06-17 PROBLEM — M54.50 LOW BACK PAIN: Status: ACTIVE | Noted: 2021-03-30

## 2025-06-17 PROBLEM — F90.9 ADULT ADHD: Chronic | Status: ACTIVE | Noted: 2022-11-04

## 2025-06-17 PROBLEM — R73.01 IMPAIRED FASTING GLUCOSE: Status: ACTIVE | Noted: 2025-06-17

## 2025-06-17 PROBLEM — E83.40 DISORDERS OF MAGNESIUM METABOLISM, UNSPECIFIED: Chronic | Status: ACTIVE | Noted: 2025-06-17

## 2025-06-17 PROBLEM — E78.00 PURE HYPERCHOLESTEROLEMIA: Chronic | Status: ACTIVE | Noted: 2025-06-17

## 2025-06-17 PROBLEM — R00.0 SINUS TACHYCARDIA: Status: ACTIVE | Noted: 2025-05-29

## 2025-06-17 PROBLEM — F41.0 PANIC ATTACKS: Chronic | Status: ACTIVE | Noted: 2025-02-17

## 2025-06-17 PROBLEM — R07.89 CHEST TIGHTNESS: Chronic | Status: ACTIVE | Noted: 2022-12-22

## 2025-06-17 PROBLEM — E53.8 B12 DEFICIENCY: Status: RESOLVED | Noted: 2023-11-17 | Resolved: 2025-06-17

## 2025-06-17 PROBLEM — D51.8 VITAMIN B12 DEFICIENCY (DIETARY) ANEMIA: Chronic | Status: ACTIVE | Noted: 2025-06-17

## 2025-06-17 PROBLEM — R55 SYNCOPE AND COLLAPSE: Chronic | Status: ACTIVE | Noted: 2025-05-29

## 2025-06-17 PROBLEM — M41.9 SCOLIOSIS DEFORMITY OF SPINE: Status: ACTIVE | Noted: 2021-03-30

## 2025-06-17 PROBLEM — D51.8 VITAMIN B12 DEFICIENCY (DIETARY) ANEMIA: Status: ACTIVE | Noted: 2025-06-17

## 2025-06-17 RX ORDER — METOPROLOL SUCCINATE 25 MG/1
25 TABLET, EXTENDED RELEASE ORAL DAILY
COMMUNITY
Start: 2025-05-29

## 2025-06-17 NOTE — PROGRESS NOTES
Adult Annual Physical  Name: Sheila Oakes      : 2000      MRN: [id number]  Encounter Provider: PABLITO Larry  Encounter Date: 2025   Encounter department: [facility] CARE    :  Assessment & Plan  Screening for HIV (human immunodeficiency virus)    Orders:    HIV 1/2 AG/AB w Reflex [facility] for 2 yr old and above; Future    Need for hepatitis C screening test    Orders:    Hepatitis C Antibody; Future    Encounter for immunization         Post concussion syndrome  2025 neurology   Continue follow-up with Ortho concussion clinic  Referral placed for transfer to Neurology concussion specialist for management of patient's ongoing post concussion symptoms.   Recommend to add Magnesium 400 mg and Riboflavin 400mg supplements for headache prevention   Continue Imitrex and NSAIDs as needed for abortive treatment.  Limit use of NSAIDs to less than 3 times a week as medication overuse headaches can occur  Lifestyle modifications were also discussed including remaining well-hydrated, eating regular meals throughout the day, getting adequate sleep and regular exercise.             Syncope, unspecified syncope type  2025 neurology  History of Present Illness   Sheila Oakes is a 24 y.o.  right handed female, with ADHD and anxiety disorder who is presenting to [facility] for evaluation of a syncopal episode.       For review:   Patient was at a  office on 24 with her friend. She was sitting in a chair alongside of her friend, when she experienced visual changes of seeming like the door was coming towards her. She felt like she was going to pass out, was dizzy,  pale, then passed out, dropping her phone. She did hit her head on the wall sustaining a concussion. She woke up drenched in sweat with numbness and tingling in her feet. Patient estimates that she was unresponsive for about 45 seconds to 1 minute. Her friend did report whole body  shaking during the episode. No tongue biting or incontinence. No post ictal phase or confusion. No immediate precipitating factors were identified. However, she did have some diarrhea the previous week and had not eaten that morning.      There is one other instance where she passed out. This occurred about 6-7 years ago when she was having labs drawn. No history of seizures.      She is being followed by Ortho Concussion clinic and will be starting OT for her continued vision difficulties.Patient continues to experience headaches and light sensitivity, but has returned to work (works remotely).      She is also seeing Cardiology where a stress test, event recorder and TTE are pending. EKGs have shown shortened CO interval.       Interval History:   Since the initial office visit on 11/1/24, patient has not experienced any further episodes of passing out.  She has experienced some presyncopal feelings described as feeling flushed, hot and heart racing.  This caused her to present to the emergency room on April 29th.  Patient has followed up with Cardiology where workup so far has been unremarkable.  A tilt table test is pending to evaluate for POTS.     Patient continues to follow-up with Ortho concussion clinic and is receiving vision therapy.  However, she continues to experience postconcussion symptoms, mainly dizziness, blurry vision and difficulty concentrating.  Headaches also continue to occur about 3-4 times a month.  She was prescribed Imitrex which does help abort her headaches.  She is asking to have her FMLA updated to allow for 4 days of her month due to her above symptoms.     Patient's PCP feels her symptoms are related to anxiety and has prescribed Lexapro.  However, patient never started taking this medication and never took the hydroxyzine.  She does not feel like she is super anxious, but does acknowledge some anxiety relating to her continued presyncopal events.     24 y.o.  right handed female,  with ADHD and anxiety disorder who is presenting to [facility] for follow-up of a possible seizure vs syncope vs POTS.  Neurological exam is normal.  Head CT was unremarkable. Given the prodromal feelings of vision closing in, dizziness, pale, and feeling like she was going to pass out, her recent event is most consistent with a syncopal episode, specifically convulsive syncope.  She should continue following up with Cardiology and complete the already ordered tilt table test to evaluate for POTS.  Will also order MRA of patient's head and neck to evaluate cerebral vasculature and rule out vertebrobasilar insufficiency due to patient's dizziness.  Note, she is unable to undergo CTA head and neck due to contrast dye allergy.  Consider obtaining EEG if tilt table test is nondiagnostic.   Orders:    Anti-thyroglobulin antibody; Future    T3; Future    Thyroid stimulating immunoglobulin; Future    Thyroglobulin w/ AB; Future    Anti-microsomal antibody; Future    RHEUMATOID FACTOR; Future    Cyclic citrul peptide antibody, IgG; Future    Sjogren's Antibodies; Future    Uric acid; Future    Lyme Total AB W Reflex to IGM/IGG; Future    C-reactive protein; Future    Sedimentation rate, automated; Future    C3 complement; Future    C4 complement; Future    Complement, total; Future    Urinalysis with microscopic; Future    Protein / creatinine ratio, urine; Future    Histone Antibody; Future    Lupus anticoagulant; Future    Beta-2 glycoprotein antibodies; Future    Cardiolipin antibody; Future    Nuclear antigen antibody; Future    GATITO Screen w/Reflex Cascade; Future    Anti-DNA antibody, double-stranded; Future    Anti-U1 RNP Ab (RDL); Future    Coello; Anti-Coello; Future    SS-A; Ro Antibodies; Future    SS-B; La Antibodies; Future    Centromere Antibody; Future    Anti-scleroderma antibody; Future    Anti-Jaclyn 1 Antibody; Future    Chronic Hepatitis Panel; Future    CK (with reflex to MB); Future     "Anti-neutrophilic cytoplasmic antibody; Future    Aldolase; Future    Parvovirus B19 antibody, IgG and IgM; Future    CBC and differential; Future    Comprehensive metabolic panel; Future    Iron Panel (Includes Ferritin, Iron Sat%, Iron, and TIBC); Future    Magnesium; Future    PTH, intact; Future    TSH, 3rd generation with Free T4 reflex; Future    Vitamin B12; Future    Vitamin D 25 hydroxy; Future    T4, free; Future    Primary hypertension  [facility]  Sheila Oakes is a 24 y.o. female who has a history of Tachycardia, HTN, ADHD, Syncope 09/2024, GERD and HLD.   The patient was seen 5/26/2025 in the ED for elevated BP and dizziness. She reports she started on BB a few months ago for elevated BP and HR. The day she went to the ED was due to dizziness and nausea. A visiting nurse at that time caring for a family member took her BP and it was 168/92 with HR of 150 bpm so she went to the ED. While in the ED her BP did stabilize on its own. She reports chest pain when her BP elevates. She also feels SOB when BP is \"high\"; usually 150-170s. She has an upcoming tilt table test through [facility]. Currently at home her average BP is 140s systolic and resting HR 110s. She also reports recent weight gain over the last 4 months and eating poorly.   Assessment & Plan:  Today in office BP is at goal <130/80  Renal Arterial duplex 05/07/2025: Negative   Pt reports her average BP at home is 140s systolic and 110 for HR  Low salt diet addressed  Low carb diet and exercise for weight loss also discussed  If BP goes >180 she is to take an additional tablet of Metoprolol and then recheck in 1 hour   F/U in 4 weeks   If BP remains elevated will add HCTZ   Orders:    Anti-thyroglobulin antibody; Future    T3; Future    Thyroid stimulating immunoglobulin; Future    Thyroglobulin w/ AB; Future    Anti-microsomal antibody; Future    RHEUMATOID FACTOR; Future    Cyclic citrul peptide antibody, IgG; Future    " Sjogren's Antibodies; Future    Uric acid; Future    Lyme Total AB W Reflex to IGM/IGG; Future    C-reactive protein; Future    Sedimentation rate, automated; Future    C3 complement; Future    C4 complement; Future    Complement, total; Future    Urinalysis with microscopic; Future    Protein / creatinine ratio, urine; Future    Histone Antibody; Future    Lupus anticoagulant; Future    Beta-2 glycoprotein antibodies; Future    Cardiolipin antibody; Future    Nuclear antigen antibody; Future    GATITO Screen w/Reflex Cascade; Future    Anti-DNA antibody, double-stranded; Future    Anti-U1 RNP Ab (RDL); Future    Coello; Anti-Coello; Future    SS-A; Ro Antibodies; Future    SS-B; La Antibodies; Future    Centromere Antibody; Future    Anti-scleroderma antibody; Future    Anti-Jaclyn 1 Antibody; Future    Chronic Hepatitis Panel; Future    CK (with reflex to MB); Future    Anti-neutrophilic cytoplasmic antibody; Future    Aldolase; Future    Parvovirus B19 antibody, IgG and IgM; Future    CBC and differential; Future    Comprehensive metabolic panel; Future    Iron Panel (Includes Ferritin, Iron Sat%, Iron, and TIBC); Future    Magnesium; Future    PTH, intact; Future    TSH, 3rd generation with Free T4 reflex; Future    Vitamin B12; Future    Vitamin D 25 hydroxy; Future    T4, free; Future    Annual physical exam    Orders:    CBC and differential; Future    Comprehensive metabolic panel; Future    Sinus tachycardia  5/29/2025 [facility]  Assessment & Plan:  F/U ED visit on 5/26/2025 for elevated BP 160s systolic and HR of 150  She reports she has been taking labetalol 200 mg twice a day for the last few months  Despite medication her HR remains >100 at rest  She went to the ED due to feeling dizzy and nauseated with a home HR reading of 150 bpm  In the ED she was monitored and both HR and BP stabilized without intervention   Currently her average resting HR is 110s  She reports she has had COVID-19 X 5 and was told  "she may be experiencing \"long-haul\" symptoms   Echo 04/22/2025: Normal biventricular functioning; EF 65%  Stop labetalol   Start Metoprolol ER 25 mg daily  If BP remains >130 after 2 weeks increase to 2 tabs daily   Continue to monitor both BP at HR at home and keep a log   Orders:    Anti-thyroglobulin antibody; Future    T3; Future    Thyroid stimulating immunoglobulin; Future    Thyroglobulin w/ AB; Future    Anti-microsomal antibody; Future    RHEUMATOID FACTOR; Future    Cyclic citrul peptide antibody, IgG; Future    Sjogren's Antibodies; Future    Uric acid; Future    Lyme Total AB W Reflex to IGM/IGG; Future    C-reactive protein; Future    Sedimentation rate, automated; Future    C3 complement; Future    C4 complement; Future    Complement, total; Future    Urinalysis with microscopic; Future    Protein / creatinine ratio, urine; Future    Histone Antibody; Future    Lupus anticoagulant; Future    Beta-2 glycoprotein antibodies; Future    Cardiolipin antibody; Future    Nuclear antigen antibody; Future    GATITO Screen w/Reflex Cascade; Future    Anti-DNA antibody, double-stranded; Future    Anti-U1 RNP Ab (RDL); Future    Coello; Anti-Coello; Future    SS-A; Ro Antibodies; Future    SS-B; La Antibodies; Future    Centromere Antibody; Future    Anti-scleroderma antibody; Future    Anti-Jaclyn 1 Antibody; Future    Chronic Hepatitis Panel; Future    CK (with reflex to MB); Future    Anti-neutrophilic cytoplasmic antibody; Future    Aldolase; Future    Parvovirus B19 antibody, IgG and IgM; Future    CBC and differential; Future    Comprehensive metabolic panel; Future    Iron Panel (Includes Ferritin, Iron Sat%, Iron, and TIBC); Future    Magnesium; Future    PTH, intact; Future    TSH, 3rd generation with Free T4 reflex; Future    Vitamin B12; Future    Vitamin D 25 hydroxy; Future    T4, free; Future    Syncope and collapse  5/29/2025 [facility]  Assessment & Plan:  Pt had an episode of unwitnessed syncope in " 09/2024 while helping her friend at veterinary clinic   She had been following with [facility] cardiology   She also reported heart racing and was noted to have elevated BP and resting HR  Orthostatic BP work up at that time was negative  She has a tile table test ordered for July to assess for POTS  She denies any further near syncope or LOC since   Orders:    Anti-thyroglobulin antibody; Future    T3; Future    Thyroid stimulating immunoglobulin; Future    Thyroglobulin w/ AB; Future    Anti-microsomal antibody; Future    RHEUMATOID FACTOR; Future    Cyclic citrul peptide antibody, IgG; Future    Sjogren's Antibodies; Future    Uric acid; Future    Lyme Total AB W Reflex to IGM/IGG; Future    C-reactive protein; Future    Sedimentation rate, automated; Future    C3 complement; Future    C4 complement; Future    Complement, total; Future    Urinalysis with microscopic; Future    Protein / creatinine ratio, urine; Future    Histone Antibody; Future    Lupus anticoagulant; Future    Beta-2 glycoprotein antibodies; Future    Cardiolipin antibody; Future    Nuclear antigen antibody; Future    GATITO Screen w/Reflex Cascade; Future    Anti-DNA antibody, double-stranded; Future    Anti-U1 RNP Ab (RDL); Future    Coello; Anti-Coello; Future    SS-A; Ro Antibodies; Future    SS-B; La Antibodies; Future    Centromere Antibody; Future    Anti-scleroderma antibody; Future    Anti-Jaclyn 1 Antibody; Future    Chronic Hepatitis Panel; Future    CK (with reflex to MB); Future    Anti-neutrophilic cytoplasmic antibody; Future    Aldolase; Future    Parvovirus B19 antibody, IgG and IgM; Future    CBC and differential; Future    Comprehensive metabolic panel; Future    Iron Panel (Includes Ferritin, Iron Sat%, Iron, and TIBC); Future    Magnesium; Future    PTH, intact; Future    TSH, 3rd generation with Free T4 reflex; Future    Vitamin B12; Future    Vitamin D 25 hydroxy; Future    T4, free; Future    Adult ADHD  Patient has Based upon  the Diagnostic and Statistical Manual of Mental Health Disorders (DSM-5 guide for mental health disorders)    Patient has positive findings as listed on the Adult ADHD-RS-IV with Adult Prompts Document.    The patient has completed the controlled substance paperwork.   This patient will have a Toxicology test scheduled for his/her next appointment.     This patient was educated on side effects, risks of taking this type of medication which include abuse, misuse, dependence, addiction and tolerance.    All questions were answered including different dosing levels, increasing and decreasing of of this medication.  We discussed their continued open discussions with the PCP in order to make sure that they are on the correct dose.     ???The risk of opioid and controlled substance addiction as well as the proper prescribed use of opioids and controlled substances been discussed.  This includes instruction to lock away and secure opioids and controlled substances from the reach of children and pets and not to share, sell, or distribute prescribed controlled substances to anyone else as such behavior will result in discontinuation of opiold therapy and controlled substances.  ??The patient has been advised that discontinuation of alcohol use is a requirement of initiation and continuation of opioid therapy and controlled substance therapy.  ???The risks of dizziness, drowsiness and motor impairment been discussed with the patient with respect to opioid and controlled substance use, and the patlent been advised not to drive or operate any heavy machinery or engage in any safety sensitive tasks while using opioids or controlled substances.  All questions were answered including different dosing levels, increasing and decreasing of of this medication.  We reviewed the potential side effects of the medications including, but are not limited to, constipation, diarrhea, nausea/upset stomach, drowsiness, fatigue, dizziness,  urinary retention, visual changes, insomnia, headaches, nightmares, slowed breathing while sleeping, UTI issues, impaired judgment, addiction issues and the risk of fatal overdose if not taken as prescribed.   We discussed the importance of notifying the office/provider immediately of any side effects.   We discussed the importance of immediate notification if there are any feelings of suicidality thoughts or behaviors   We discussed the importance of contacting the office if he has any tachycardia or fainting situations.     The patient was warned against driving while taking sedation medications.    We discussed that sharing medications is a felony.   We discussed other side effects such as QT prolongation, risks of Myocardial Infarction (heart attack), stroke and sudden death.   We discussed immediate notification if there is any seizure-like activity.    We discussed issues with angioedema as an anaphylactic reactions.      I have personally reviewed the [place] Drug Monitoring Program (PDMP) website prior to prescribing this medication.  The patient understands and agrees to use these medications only as prescribed. At this point in time, the patient is showing no signs of addiction, abuse, diversion or suicidal ideation.  All the patient's questions were answered to their satisfaction.  [place] Prescription Drug Monitoring Program report was reviewed and was appropriate     I have personally reviewed the [place] Drug Monitoring Program (PDMP) website prior to prescribing this medication and they have been found to be appropriate for this medication.  The patient understands and agrees to use these medications only as prescribed.   At this point in time, the patient is showing no signs of addiction, abuse, diversion or suicidal ideation.  The patient's use has been found to be appropriate without any concerns for misuse by the patient.   The patient's current conditions require continued  scheduled medication use at this time.   Future review for continued appropriate medication use and misuse will continue.    All the patient's questions were answered to their satisfaction.  [place] Prescription Drug Monitoring Program report was reviewed and was appropriate     ???The patient been advised to the rationale and requirement of using the PDMP, UDS, Pill Counts, and pain assessment tools to initiate and continue opioid and controlled substance therapy.  The patient has also been advised as to the appropriate way to call in for refill of medication, allowing for a five to seven day time frame for medications refills.    The patient understands and agrees to use these medications only as prescribed. At this point in time, the patient is showing no signs of addiction, abuse, diversion or suicidal ideation.  All the patient's questions were answered to their satisfaction.    Patient with positive findings on her ADHD assessment (under flowsheets)  Will restart the patient on Adderall 15mg capsule.  Will monitor for improvement     Orders:    amphetamine-dextroamphetamine (ADDERALL XR, 15MG,) 15 MG 24 hr capsule; Take 1 capsule (15 mg total) by mouth every morning Max Daily Amount: 15 mg    CIARA (generalized anxiety disorder)         Panic attacks         Other folate deficiency anemias    Orders:    Folate; Future    Impaired fasting glucose    Orders:    Hemoglobin A1C; Future    On long term drug therapy    Orders:    Hemoglobin A1C; Future    Lipid Panel with Direct LDL reflex; Future    TSH, 3rd generation with Free T4 reflex; Future    Vitamin D 25 hydroxy; Future    Iron deficiency anemia secondary to inadequate dietary iron intake    Orders:    Iron Panel (Includes Ferritin, Iron Sat%, Iron, and TIBC); Future    Pure hypercholesterolemia    Orders:    Lipid Panel with Direct LDL reflex; Future    Disorders of magnesium metabolism, unspecified    Orders:    Magnesium; Future    Secondary  hyperparathyroidism, renal (HCC)    Orders:    PTH, intact; Future    Abnormal results of thyroid function studies    Orders:    Anti-thyroglobulin antibody; Future    T3; Future    Thyroid stimulating immunoglobulin; Future    Thyroglobulin w/ AB; Future    TSH, 3rd generation with Free T4 reflex; Future    T4, free; Future    Vitamin B12 deficiency (dietary) anemia    Orders:    Vitamin B12; Future    Vitamin D deficiency    Orders:    Vitamin D 25 hydroxy; Future    Other fatigue    Orders:    Anti-thyroglobulin antibody; Future    T3; Future    Thyroid stimulating immunoglobulin; Future    Thyroglobulin w/ AB; Future    Anti-microsomal antibody; Future    RHEUMATOID FACTOR; Future    Cyclic citrul peptide antibody, IgG; Future    Sjogren's Antibodies; Future    Uric acid; Future    Lyme Total AB W Reflex to IGM/IGG; Future    C-reactive protein; Future    Sedimentation rate, automated; Future    C3 complement; Future    C4 complement; Future    Complement, total; Future    Urinalysis with microscopic; Future    Protein / creatinine ratio, urine; Future    Histone Antibody; Future    Lupus anticoagulant; Future    Beta-2 glycoprotein antibodies; Future    Cardiolipin antibody; Future    Nuclear antigen antibody; Future    GATITO Screen w/Reflex Cascade; Future    Anti-DNA antibody, double-stranded; Future    Anti-U1 RNP Ab (RDL); Future    Coello; Anti-Coello; Future    SS-A; Ro Antibodies; Future    SS-B; La Antibodies; Future    Centromere Antibody; Future    Anti-scleroderma antibody; Future    Anti-Jaclyn 1 Antibody; Future    Chronic Hepatitis Panel; Future    CK (with reflex to MB); Future    Anti-neutrophilic cytoplasmic antibody; Future    Aldolase; Future    Parvovirus B19 antibody, IgG and IgM; Future    CBC and differential; Future    Comprehensive metabolic panel; Future    Folate; Future    Iron Panel (Includes Ferritin, Iron Sat%, Iron, and TIBC); Future    Magnesium; Future    PTH, intact; Future    TSH,  3rd generation with Free T4 reflex; Future    T4, free; Future    Autoimmune disorder of autonomic nervous system    Orders:    Hepatitis C Antibody; Future    Anti-thyroglobulin antibody; Future    T3; Future    Thyroid stimulating immunoglobulin; Future    Thyroglobulin w/ AB; Future    Anti-microsomal antibody; Future    RHEUMATOID FACTOR; Future    Cyclic citrul peptide antibody, IgG; Future    Sjogren's Antibodies; Future    Uric acid; Future    Lyme Total AB W Reflex to IGM/IGG; Future    C-reactive protein; Future    Sedimentation rate, automated; Future    C3 complement; Future    C4 complement; Future    Complement, total; Future    Urinalysis with microscopic; Future    Protein / creatinine ratio, urine; Future    Histone Antibody; Future    Lupus anticoagulant; Future    Beta-2 glycoprotein antibodies; Future    Cardiolipin antibody; Future    Nuclear antigen antibody; Future    GATITO Screen w/Reflex Cascade; Future    Anti-DNA antibody, double-stranded; Future    Anti-U1 RNP Ab (RDL); Future    Coello; Anti-Coello; Future    SS-A; Ro Antibodies; Future    SS-B; La Antibodies; Future    Centromere Antibody; Future    Anti-scleroderma antibody; Future    Anti-Jaclyn 1 Antibody; Future    Chronic Hepatitis Panel; Future    CK (with reflex to MB); Future    Anti-neutrophilic cytoplasmic antibody; Future    Aldolase; Future    Parvovirus B19 antibody, IgG and IgM; Future    CBC and differential; Future    Comprehensive metabolic panel; Future    Iron Panel (Includes Ferritin, Iron Sat%, Iron, and TIBC); Future    Magnesium; Future    TSH, 3rd generation with Free T4 reflex; Future    Vitamin D 25 hydroxy; Future    T4, free; Future    Autoimmune myopathy    Orders:    Anti-thyroglobulin antibody; Future    T3; Future    Thyroid stimulating immunoglobulin; Future    Thyroglobulin w/ AB; Future    Anti-microsomal antibody; Future    RHEUMATOID FACTOR; Future    Cyclic citrul peptide antibody, IgG; Future    Sjogren's  Antibodies; Future    Uric acid; Future    Lyme Total AB W Reflex to IGM/IGG; Future    C-reactive protein; Future    Sedimentation rate, automated; Future    C3 complement; Future    C4 complement; Future    Complement, total; Future    Urinalysis with microscopic; Future    Protein / creatinine ratio, urine; Future    Histone Antibody; Future    Lupus anticoagulant; Future    Beta-2 glycoprotein antibodies; Future    Cardiolipin antibody; Future    Nuclear antigen antibody; Future    GATITO Screen w/Reflex Cascade; Future    Anti-DNA antibody, double-stranded; Future    Anti-U1 RNP Ab (RDL); Future    Coello; Anti-Coello; Future    SS-A; Ro Antibodies; Future    SS-B; La Antibodies; Future    Centromere Antibody; Future    Anti-scleroderma antibody; Future    Anti-Jaclyn 1 Antibody; Future    Chronic Hepatitis Panel; Future    CK (with reflex to MB); Future    Anti-neutrophilic cytoplasmic antibody; Future    Aldolase; Future    Parvovirus B19 antibody, IgG and IgM; Future    CBC and differential; Future    Comprehensive metabolic panel; Future    Iron Panel (Includes Ferritin, Iron Sat%, Iron, and TIBC); Future    Magnesium; Future    TSH, 3rd generation with Free T4 reflex; Future    Vitamin D 25 hydroxy; Future    T4, free; Future    Postural orthostatic tachycardia syndrome (POTS)  6/18/2025  Continue   Metoprolol succinate   Following with cardiology at this time   [place] paperwork filled out       7/30/2025   Has TILT Test scheduled         Labyrinthitis of right ear           Preventive Screenings:  - Diabetes Screening: screening up-to-date  - Cholesterol Screening: screening not indicated and has hyperlipidemia   - Cervical cancer screening: screening up-to-date   - Lung cancer screening: screening not indicated     Counseling/Anticipatory Guidance:  - Alcohol: discussed moderation in alcohol intake and recommendations for healthy alcohol use.   - Drug use: discussed harms of illicit drug use and how it can  negatively impact mental/physical health.   - Tobacco use: discussed harms of tobacco use and management options for quitting.   - Dental health: discussed importance of regular tooth brushing, flossing, and dental visits.   - Sexual health: discussed sexually transmitted diseases, partner selection, use of condoms, avoidance of unintended pregnancy, and contraceptive alternatives.   - Diet: discussed recommendations for a healthy/well-balanced diet.   - Exercise: the importance of regular exercise/physical activity was discussed. Recommend exercise 3-5 times per week for at least 30 minutes.   - Injury prevention: discussed safety/seat belts, safety helmets, smoke detectors, carbon monoxide detectors, and smoking near bedding or upholstery.       Depression Screening and Follow-up Plan:   Patient assessed for underlying major depression. Brief counseling provided and recommend additional follow-up/re-evaluation next office visit.         History of Present Illness     Adult Annual Physical:  Patient presents for annual physical.     Diet and Physical Activity:  - Diet/Nutrition: no special diet.  - Exercise: walking and no formal exercise.    General Health:  - Sleep: 4-6 hours of sleep on average.  - Hearing: normal hearing bilateral ears.  - Vision: no vision problems.  - Dental: regular dental visits.    /GYN Health:  - Follows with GYN: yes.   - History of STDs: no    Advanced Care Planning:  - Has an advanced directive?: no    - Has a durable medical POA?: no    - ACP document given to patient?: no      Review of Systems   Constitutional:  Positive for fatigue. Negative for activity change, chills and fever.   HENT:  Negative for rhinorrhea and sore throat.    Eyes:  Negative for pain.   Respiratory:  Negative for cough and shortness of breath.    Cardiovascular:  Positive for chest pain and palpitations. Negative for leg swelling.   Gastrointestinal:  Negative for abdominal pain, constipation, diarrhea,  "nausea and vomiting.   Genitourinary:  Negative for difficulty urinating, flank pain, frequency and urgency.   Musculoskeletal:  Positive for arthralgias and myalgias. Negative for gait problem and joint swelling.   Skin:  Negative for color change.   Neurological:  Positive for syncope and headaches. Negative for dizziness, weakness and light-headedness.   Psychiatric/Behavioral:  Positive for decreased concentration and sleep disturbance. The patient is nervous/anxious.    All other systems reviewed and are negative.    Medications Ordered Prior to Encounter[1]     Objective   /82 (Patient Position: Sitting, Cuff Size: Standard)   Pulse 90   Temp 97.9 °F (36.6 °C) (Tympanic)   Resp 16   Ht 5' 6\" (1.676 m)   Wt 78.9 kg (174 lb)   SpO2 99%   BMI 28.08 kg/m²     Physical Exam  Vitals and nursing note reviewed.   Constitutional:       General: She is awake. She is not in acute distress.     Appearance: Normal appearance. She is well-developed.   HENT:      Head: Normocephalic and atraumatic.      Nose: Nose normal.      Mouth/Throat:      Mouth: Mucous membranes are moist.     Eyes:      Conjunctiva/sclera: Conjunctivae normal.       Cardiovascular:      Rate and Rhythm: Normal rate and regular rhythm.      Pulses: Normal pulses.      Heart sounds: Normal heart sounds. No murmur heard.     Comments: C/o palpitations  Pulmonary:      Effort: Pulmonary effort is normal. No respiratory distress.      Breath sounds: Normal breath sounds.   Abdominal:      General: Bowel sounds are normal.      Palpations: Abdomen is soft.      Tenderness: There is no abdominal tenderness.     Musculoskeletal:      Cervical back: Neck supple.      Right lower leg: No edema.      Left lower leg: No edema.      Comments: Chronic pain      Skin:     General: Skin is warm and dry.     Neurological:      Mental Status: She is alert and oriented to person, place, and time.      Motor: Weakness present.     Psychiatric:         " Attention and Perception: Attention normal.         Mood and Affect: Mood is anxious.         Speech: Speech normal.         Behavior: Behavior normal. Behavior is cooperative.         Thought Content: Thought content normal.         Cognition and Memory: Cognition normal.         Judgment: Judgment normal.      Comments: Decreased concentration        Administrative Statements   I have spent a total time of 60 minutes in caring for this patient on the day of the visit/encounter including Diagnostic results, Prognosis, Risks and benefits of tx options, Instructions for management, Patient and family education, Importance of tx compliance, Risk factor reductions, Impressions, Counseling / Coordination of care, Documenting in the medical record, Reviewing/placing orders in the medical record (including tests, medications, and/or procedures), and Obtaining or reviewing history  .         [1]   Current Outpatient Medications on File Prior to Visit   Medication Sig Dispense Refill    albuterol (ProAir HFA) 90 mcg/act inhaler Inhale 2 puffs every 4 (four) hours as needed for wheezing or shortness of breath 8.5 g 1    meclizine (ANTIVERT) 25 mg tablet Take 1 tablet (25 mg total) by mouth 3 (three) times a day as needed for dizziness 30 tablet 0    metoprolol succinate (TOPROL-XL) 25 mg 24 hr tablet Take 25 mg by mouth daily      norgestimate-ethinyl estradiol (ORTHO TRI-CYCLEN,TRINESSA) 0.18/0.215/0.25 MG-35 MCG per tablet TAKE 1 TABLET BY MOUTH EVERY DAY 84 tablet 1    SUMAtriptan (Imitrex) 25 mg tablet Take 1 tablet (25 mg total) by mouth once as needed for migraine 10 tablet 1    [DISCONTINUED] labetalol (NORMODYNE) 200 mg tablet Take 1 tablet (200 mg total) by mouth 2 (two) times a day (Patient not taking: Reported on 6/18/2025) 60 tablet 8     No current facility-administered medications on file prior to visit.

## 2025-06-17 NOTE — ASSESSMENT & PLAN NOTE
"5/29/2025 [facility] Cardiology  Sheila Oakes is a 24 y.o. female who has a history of Tachycardia, HTN, ADHD, Syncope 09/2024, GERD and HLD.   The patient was seen 5/26/2025 in the ED for elevated BP and dizziness. She reports she started on BB a few months ago for elevated BP and HR. The day she went to the ED was due to dizziness and nausea. A visiting nurse at that time caring for a family member took her BP and it was 168/92 with HR of 150 bpm so she went to the ED. While in the ED her BP did stabilize on its own. She reports chest pain when her BP elevates. She also feels SOB when BP is \"high\"; usually 150-170s. She has an upcoming tilt table test through [facility]. Currently at home her average BP is 140s systolic and resting HR 110s. She also reports recent weight gain over the last 4 months and eating poorly.   Assessment & Plan:  Today in office BP is at goal <130/80  Renal Arterial duplex 05/07/2025: Negative   Pt reports her average BP at home is 140s systolic and 110 for HR  Low salt diet addressed  Low carb diet and exercise for weight loss also discussed  If BP goes >180 she is to take an additional tablet of Metoprolol and then recheck in 1 hour   F/U in 4 weeks   If BP remains elevated will add HCTZ   Orders:    Anti-thyroglobulin antibody; Future    T3; Future    Thyroid stimulating immunoglobulin; Future    Thyroglobulin w/ AB; Future    Anti-microsomal antibody; Future    RHEUMATOID FACTOR; Future    Cyclic citrul peptide antibody, IgG; Future    Sjogren's Antibodies; Future    Uric acid; Future    Lyme Total AB W Reflex to IGM/IGG; Future    C-reactive protein; Future    Sedimentation rate, automated; Future    C3 complement; Future    C4 complement; Future    Complement, total; Future    Urinalysis with microscopic; Future    Protein / creatinine ratio, urine; Future    Histone Antibody; Future    Lupus anticoagulant; Future    Beta-2 glycoprotein antibodies; Future    Cardiolipin antibody; " Future    Nuclear antigen antibody; Future    GATITO Screen w/Reflex Cascade; Future    Anti-DNA antibody, double-stranded; Future    Anti-U1 RNP Ab (RDL); Future    Coello; Anti-Coello; Future    SS-A; Ro Antibodies; Future    SS-B; La Antibodies; Future    Centromere Antibody; Future    Anti-scleroderma antibody; Future    Anti-Jaclyn 1 Antibody; Future    Chronic Hepatitis Panel; Future    CK (with reflex to MB); Future    Anti-neutrophilic cytoplasmic antibody; Future    Aldolase; Future    Parvovirus B19 antibody, IgG and IgM; Future    CBC and differential; Future    Comprehensive metabolic panel; Future    Iron Panel (Includes Ferritin, Iron Sat%, Iron, and TIBC); Future    Magnesium; Future    PTH, intact; Future    TSH, 3rd generation with Free T4 reflex; Future    Vitamin B12; Future    Vitamin D 25 hydroxy; Future    T4, free; Future

## 2025-06-17 NOTE — ASSESSMENT & PLAN NOTE
5/29/2025 [facility] Cardiology  Assessment & Plan:  Pt had an episode of unwitnessed syncope in 09/2024 while helping her friend at veterinary clinic   She had been following with [facility] cardiology   She also reported heart racing and was noted to have elevated BP and resting HR  Orthostatic BP work up at that time was negative  She has a tile table test ordered for July to assess for POTS  She denies any further near syncope or LOC since   Orders:    Anti-thyroglobulin antibody; Future    T3; Future    Thyroid stimulating immunoglobulin; Future    Thyroglobulin w/ AB; Future    Anti-microsomal antibody; Future    RHEUMATOID FACTOR; Future    Cyclic citrul peptide antibody, IgG; Future    Sjogren's Antibodies; Future    Uric acid; Future    Lyme Total AB W Reflex to IGM/IGG; Future    C-reactive protein; Future    Sedimentation rate, automated; Future    C3 complement; Future    C4 complement; Future    Complement, total; Future    Urinalysis with microscopic; Future    Protein / creatinine ratio, urine; Future    Histone Antibody; Future    Lupus anticoagulant; Future    Beta-2 glycoprotein antibodies; Future    Cardiolipin antibody; Future    Nuclear antigen antibody; Future    GATITO Screen w/Reflex Cascade; Future    Anti-DNA antibody, double-stranded; Future    Anti-U1 RNP Ab (RDL); Future    Coello; Anti-Coello; Future    SS-A; Ro Antibodies; Future    SS-B; La Antibodies; Future    Centromere Antibody; Future    Anti-scleroderma antibody; Future    Anti-Jaclyn 1 Antibody; Future    Chronic Hepatitis Panel; Future    CK (with reflex to MB); Future    Anti-neutrophilic cytoplasmic antibody; Future    Aldolase; Future    Parvovirus B19 antibody, IgG and IgM; Future    CBC and differential; Future    Comprehensive metabolic panel; Future    Iron Panel (Includes Ferritin, Iron Sat%, Iron, and TIBC); Future    Magnesium; Future    PTH, intact; Future    TSH, 3rd generation with Free T4 reflex; Future    Vitamin B12; Future     Vitamin D 25 hydroxy; Future    T4, free; Future

## 2025-06-17 NOTE — PATIENT INSTRUCTIONS
"Patient Education     Routine physical for adults   The Basics   Written by the doctors and editors at [facility]   What is a physical? -- A physical is a routine visit, or \"check-up,\" with your doctor. You might also hear it called a \"wellness visit\" or \"preventive visit.\"  During each visit, the doctor will:   Ask about your physical and mental health   Ask about your habits, behaviors, and lifestyle   Do an exam   Give you vaccines if needed   Talk to you about any medicines you take   Give advice about your health   Answer your questions  Getting regular check-ups is an important part of taking care of your health. It can help your doctor find and treat any problems you have. But it's also important for preventing health problems.  A routine physical is different from a \"sick visit.\" A sick visit is when you see a doctor because of a health concern or problem. Since physicals are scheduled ahead of time, you can think about what you want to ask the doctor.  How often should I get a physical? -- It depends on your age and health. In general, for people age 21 years and older:   If you are younger than 50 years, you might be able to get a physical every 3 years.   If you are 50 years or older, your doctor might recommend a physical every year.  If you have an ongoing health condition, like diabetes or high blood pressure, your doctor will probably want to see you more often.  What happens during a physical? -- In general, each visit will include:   Physical exam - The doctor or nurse will check your height, weight, heart rate, and blood pressure. They will also look at your eyes and ears. They will ask about how you are feeling and whether you have any symptoms that bother you.   Medicines - It's a good idea to bring a list of all the medicines you take to each doctor visit. Your doctor will talk to you about your medicines and answer any questions. Tell them if you are having any side effects that bother you. You " "should also tell them if you are having trouble paying for any of your medicines.   Habits and behaviors - This includes:   Your diet   Your exercise habits   Whether you smoke, drink alcohol, or use drugs   Whether you are sexually active   Whether you feel safe at home  Your doctor will talk to you about things you can do to improve your health and lower your risk of health problems. They will also offer help and support. For example, if you want to quit smoking, they can give you advice and might prescribe medicines. If you want to improve your diet or get more physical activity, they can help you with this, too.   Lab tests, if needed - The tests you get will depend on your age and situation. For example, your doctor might want to check your:   Cholesterol   Blood sugar   Iron level   Vaccines - The recommended vaccines will depend on your age, health, and what vaccines you already had. Vaccines are very important because they can prevent certain serious or deadly infections.   Discussion of screening - \"Screening\" means checking for diseases or other health problems before they cause symptoms. Your doctor can recommend screening based on your age, risk, and preferences. This might include tests to check for:   Cancer, such as breast, prostate, cervical, ovarian, colorectal, prostate, lung, or skin cancer   Sexually transmitted infections, such as chlamydia and gonorrhea   Mental health conditions like depression and anxiety  Your doctor will talk to you about the different types of screening tests. They can help you decide which screenings to have. They can also explain what the results might mean.   Answering questions - The physical is a good time to ask the doctor or nurse questions about your health. If needed, they can refer you to other doctors or specialists, too.  Adults older than 65 years often need other care, too. As you get older, your doctor will talk to you about:   How to prevent falling at " home   Hearing or vision tests   Memory testing   How to take your medicines safely   Making sure that you have the help and support you need at home  All topics are updated as new evidence becomes available and our peer review process is complete.  This topic retrieved from [employer] on: May 02, 2024.  Topic 672962 Version 1.0  Release: 32.4.3 - C32.122  © 2024 UpToDate, Inc. and/or its affiliates. All rights reserved.  Consumer Information Use and Disclaimer   Disclaimer: This generalized information is a limited summary of diagnosis, treatment, and/or medication information. It is not meant to be comprehensive and should be used as a tool to help the user understand and/or assess potential diagnostic and treatment options. It does NOT include all information about conditions, treatments, medications, side effects, or risks that may apply to a specific patient. It is not intended to be medical advice or a substitute for the medical advice, diagnosis, or treatment of a health care provider based on the health care provider's examination and assessment of a patient's specific and unique circumstances. Patients must speak with a health care provider for complete information about their health, medical questions, and treatment options, including any risks or benefits regarding use of medications. This information does not endorse any treatments or medications as safe, effective, or approved for treating a specific patient. UpToDate, Inc. and its affiliates disclaim any warranty or liability relating to this information or the use thereof.The use of this information is governed by the Terms of Use, available at https://www.[employer]SIM Digitaluwer.com/en/know/clinical-effectiveness-terms. 2024© UpToDate, Inc. and its affiliates and/or licensors. All rights reserved.  Copyright   © 2024 UpToDate, Inc. and/or its affiliates. All rights reserved.

## 2025-06-17 NOTE — ASSESSMENT & PLAN NOTE
Patient has Based upon the Diagnostic and Statistical Manual of Mental Health Disorders (DSM-5 guide for mental health disorders)    Patient has positive findings as listed on the Adult ADHD-RS-IV with Adult Prompts Document.    The patient has completed the controlled substance paperwork.   This patient will have a Toxicology test scheduled for his/her next appointment.     This patient was educated on side effects, risks of taking this type of medication which include abuse, misuse, dependence, addiction and tolerance.    All questions were answered including different dosing levels, increasing and decreasing of of this medication.  We discussed their continued open discussions with the PCP in order to make sure that they are on the correct dose.     ???The risk of opioid and controlled substance addiction as well as the proper prescribed use of opioids and controlled substances been discussed.  This includes instruction to lock away and secure opioids and controlled substances from the reach of children and pets and not to share, sell, or distribute prescribed controlled substances to anyone else as such behavior will result in discontinuation of opiold therapy and controlled substances.  ??The patient has been advised that discontinuation of alcohol use is a requirement of initiation and continuation of opioid therapy and controlled substance therapy.  ???The risks of dizziness, drowsiness and motor impairment been discussed with the patient with respect to opioid and controlled substance use, and the patlent been advised not to drive or operate any heavy machinery or engage in any safety sensitive tasks while using opioids or controlled substances.  All questions were answered including different dosing levels, increasing and decreasing of of this medication.  We reviewed the potential side effects of the medications including, but are not limited to, constipation, diarrhea, nausea/upset stomach, drowsiness,  fatigue, dizziness, urinary retention, visual changes, insomnia, headaches, nightmares, slowed breathing while sleeping, UTI issues, impaired judgment, addiction issues and the risk of fatal overdose if not taken as prescribed.   We discussed the importance of notifying the office/provider immediately of any side effects.   We discussed the importance of immediate notification if there are any feelings of suicidality thoughts or behaviors   We discussed the importance of contacting the office if he has any tachycardia or fainting situations.     The patient was warned against driving while taking sedation medications.    We discussed that sharing medications is a felony.   We discussed other side effects such as QT prolongation, risks of Myocardial Infarction (heart attack), stroke and sudden death.   We discussed immediate notification if there is any seizure-like activity.    We discussed issues with angioedema as an anaphylactic reactions.      I have personally reviewed the [place] Drug Monitoring Program (PDMP) website prior to prescribing this medication.  The patient understands and agrees to use these medications only as prescribed. At this point in time, the patient is showing no signs of addiction, abuse, diversion or suicidal ideation.  All the patient's questions were answered to their satisfaction.  [place] Prescription Drug Monitoring Program report was reviewed and was appropriate     I have personally reviewed the [place] Drug Monitoring Program (PDMP) website prior to prescribing this medication and they have been found to be appropriate for this medication.  The patient understands and agrees to use these medications only as prescribed.   At this point in time, the patient is showing no signs of addiction, abuse, diversion or suicidal ideation.  The patient's use has been found to be appropriate without any concerns for misuse by the patient.   The patient's current conditions  require continued scheduled medication use at this time.   Future review for continued appropriate medication use and misuse will continue.    All the patient's questions were answered to their satisfaction.  [place] Prescription Drug Monitoring Program report was reviewed and was appropriate     ???The patient been advised to the rationale and requirement of using the PDMP, UDS, Pill Counts, and pain assessment tools to initiate and continue opioid and controlled substance therapy.  The patient has also been advised as to the appropriate way to call in for refill of medication, allowing for a five to seven day time frame for medications refills.    The patient understands and agrees to use these medications only as prescribed. At this point in time, the patient is showing no signs of addiction, abuse, diversion or suicidal ideation.  All the patient's questions were answered to their satisfaction.    Patient with positive findings on her ADHD assessment (under flowsheets)  Will restart the patient on Adderall 15mg capsule.  Will monitor for improvement     Orders:    amphetamine-dextroamphetamine (ADDERALL XR, 15MG,) 15 MG 24 hr capsule; Take 1 capsule (15 mg total) by mouth every morning Max Daily Amount: 15 mg

## 2025-06-17 NOTE — ASSESSMENT & PLAN NOTE
5/14/2025 neurology   Continue follow-up with Ortho concussion clinic  Referral placed for transfer to Neurology concussion specialist for management of patient's ongoing post concussion symptoms.   Recommend to add Magnesium 400 mg and Riboflavin 400mg supplements for headache prevention   Continue Imitrex and NSAIDs as needed for abortive treatment.  Limit use of NSAIDs to less than 3 times a week as medication overuse headaches can occur  Lifestyle modifications were also discussed including remaining well-hydrated, eating regular meals throughout the day, getting adequate sleep and regular exercise.

## 2025-06-17 NOTE — ASSESSMENT & PLAN NOTE
Orders:    Hepatitis C Antibody; Future    Anti-thyroglobulin antibody; Future    T3; Future    Thyroid stimulating immunoglobulin; Future    Thyroglobulin w/ AB; Future    Anti-microsomal antibody; Future    RHEUMATOID FACTOR; Future    Cyclic citrul peptide antibody, IgG; Future    Sjogren's Antibodies; Future    Uric acid; Future    Lyme Total AB W Reflex to IGM/IGG; Future    C-reactive protein; Future    Sedimentation rate, automated; Future    C3 complement; Future    C4 complement; Future    Complement, total; Future    Urinalysis with microscopic; Future    Protein / creatinine ratio, urine; Future    Histone Antibody; Future    Lupus anticoagulant; Future    Beta-2 glycoprotein antibodies; Future    Cardiolipin antibody; Future    Nuclear antigen antibody; Future    GATITO Screen w/Reflex Cascade; Future    Anti-DNA antibody, double-stranded; Future    Anti-U1 RNP Ab (RDL); Future    Coello; Anti-Coello; Future    SS-A; Ro Antibodies; Future    SS-B; La Antibodies; Future    Centromere Antibody; Future    Anti-scleroderma antibody; Future    Anti-Jaclyn 1 Antibody; Future    Chronic Hepatitis Panel; Future    CK (with reflex to MB); Future    Anti-neutrophilic cytoplasmic antibody; Future    Aldolase; Future    Parvovirus B19 antibody, IgG and IgM; Future    CBC and differential; Future    Comprehensive metabolic panel; Future    Iron Panel (Includes Ferritin, Iron Sat%, Iron, and TIBC); Future    Magnesium; Future    TSH, 3rd generation with Free T4 reflex; Future    Vitamin D 25 hydroxy; Future    T4, free; Future     Detail Level: Zone Plan: Patient has tried and failed: OTCs, clindamycin, minocycline \\n\\nDiscussed RBCs of Accutane\\n-risk of inflamm bowel disease\\n\\n-risk of depression/mood changes; patient denies history of depression \\n-strict sun protection \\n-no alcohol \\n-no tetracycline antibiotics\\n-no blood donation \\n-no waxing, tattoos,piercings or threading \\n-no Tylenol \\n\\nPatient to d/c minocycline; will send Accutane Rx in 2 weeks \\nPatient advised to get fasting labs within 2 week period; will need baseline labs before sending prescription \\nRecommended gentle face wash, moisturizer and sunscreen daily \\nPatient verbalized understanding Render In Strict Bullet Format?: No Discontinue Regimen: Minocycline

## 2025-06-17 NOTE — ASSESSMENT & PLAN NOTE
Orders:    Anti-thyroglobulin antibody; Future    T3; Future    Thyroid stimulating immunoglobulin; Future    Thyroglobulin w/ AB; Future    TSH, 3rd generation with Free T4 reflex; Future    T4, free; Future

## 2025-06-17 NOTE — ASSESSMENT & PLAN NOTE
Orders:    Anti-thyroglobulin antibody; Future    T3; Future    Thyroid stimulating immunoglobulin; Future    Thyroglobulin w/ AB; Future    Anti-microsomal antibody; Future    RHEUMATOID FACTOR; Future    Cyclic citrul peptide antibody, IgG; Future    Sjogren's Antibodies; Future    Uric acid; Future    Lyme Total AB W Reflex to IGM/IGG; Future    C-reactive protein; Future    Sedimentation rate, automated; Future    C3 complement; Future    C4 complement; Future    Complement, total; Future    Urinalysis with microscopic; Future    Protein / creatinine ratio, urine; Future    Histone Antibody; Future    Lupus anticoagulant; Future    Beta-2 glycoprotein antibodies; Future    Cardiolipin antibody; Future    Nuclear antigen antibody; Future    GATITO Screen w/Reflex Cascade; Future    Anti-DNA antibody, double-stranded; Future    Anti-U1 RNP Ab (RDL); Future    Coello; Anti-Coello; Future    SS-A; Ro Antibodies; Future    SS-B; La Antibodies; Future    Centromere Antibody; Future    Anti-scleroderma antibody; Future    Anti-Jaclyn 1 Antibody; Future    Chronic Hepatitis Panel; Future    CK (with reflex to MB); Future    Anti-neutrophilic cytoplasmic antibody; Future    Aldolase; Future    Parvovirus B19 antibody, IgG and IgM; Future    CBC and differential; Future    Comprehensive metabolic panel; Future    Iron Panel (Includes Ferritin, Iron Sat%, Iron, and TIBC); Future    Magnesium; Future    TSH, 3rd generation with Free T4 reflex; Future    Vitamin D 25 hydroxy; Future    T4, free; Future

## 2025-06-17 NOTE — ASSESSMENT & PLAN NOTE
"5/29/2025 [facility]iology  Assessment & Plan:  F/U ED visit on 5/26/2025 for elevated BP 160s systolic and HR of 150  She reports she has been taking labetalol 200 mg twice a day for the last few months  Despite medication her HR remains >100 at rest  She went to the ED due to feeling dizzy and nauseated with a home HR reading of 150 bpm  In the ED she was monitored and both HR and BP stabilized without intervention   Currently her average resting HR is 110s  She reports she has had COVID-19 X 5 and was told she may be experiencing \"long-haul\" symptoms   Echo 04/22/2025: Normal biventricular functioning; EF 65%  Stop labetalol   Start Metoprolol ER 25 mg daily  If BP remains >130 after 2 weeks increase to 2 tabs daily   Continue to monitor both BP at HR at home and keep a log   Orders:    Anti-thyroglobulin antibody; Future    T3; Future    Thyroid stimulating immunoglobulin; Future    Thyroglobulin w/ AB; Future    Anti-microsomal antibody; Future    RHEUMATOID FACTOR; Future    Cyclic citrul peptide antibody, IgG; Future    Sjogren's Antibodies; Future    Uric acid; Future    Lyme Total AB W Reflex to IGM/IGG; Future    C-reactive protein; Future    Sedimentation rate, automated; Future    C3 complement; Future    C4 complement; Future    Complement, total; Future    Urinalysis with microscopic; Future    Protein / creatinine ratio, urine; Future    Histone Antibody; Future    Lupus anticoagulant; Future    Beta-2 glycoprotein antibodies; Future    Cardiolipin antibody; Future    Nuclear antigen antibody; Future    GATITO Screen w/Reflex Cascade; Future    Anti-DNA antibody, double-stranded; Future    Anti-U1 RNP Ab (RDL); Future    Coello; Anti-Coello; Future    SS-A; Ro Antibodies; Future    SS-B; La Antibodies; Future    Centromere Antibody; Future    Anti-scleroderma antibody; Future    Anti-Jaclyn 1 Antibody; Future    Chronic Hepatitis Panel; Future    CK (with reflex to MB); Future    Anti-neutrophilic cytoplasmic " antibody; Future    Aldolase; Future    Parvovirus B19 antibody, IgG and IgM; Future    CBC and differential; Future    Comprehensive metabolic panel; Future    Iron Panel (Includes Ferritin, Iron Sat%, Iron, and TIBC); Future    Magnesium; Future    PTH, intact; Future    TSH, 3rd generation with Free T4 reflex; Future    Vitamin B12; Future    Vitamin D 25 hydroxy; Future    T4, free; Future

## 2025-06-17 NOTE — ASSESSMENT & PLAN NOTE
5/14/2025 neurology  History of Present Illness   Sheila Oakes is a 24 y.o.  right handed female, with ADHD and anxiety disorder who is presenting to [facility] for evaluation of a syncopal episode.       For review:   Patient was at a  office on 9/27/24 with her friend. She was sitting in a chair alongside of her friend, when she experienced visual changes of seeming like the door was coming towards her. She felt like she was going to pass out, was dizzy,  pale, then passed out, dropping her phone. She did hit her head on the wall sustaining a concussion. She woke up drenched in sweat with numbness and tingling in her feet. Patient estimates that she was unresponsive for about 45 seconds to 1 minute. Her friend did report whole body shaking during the episode. No tongue biting or incontinence. No post ictal phase or confusion. No immediate precipitating factors were identified. However, she did have some diarrhea the previous week and had not eaten that morning.      There is one other instance where she passed out. This occurred about 6-7 years ago when she was having labs drawn. No history of seizures.      She is being followed by [facility] and will be starting OT for her continued vision difficulties.Patient continues to experience headaches and light sensitivity, but has returned to work (works remotely).      She is also seeing Cardiology where a stress test, event recorder and TTE are pending. EKGs have shown shortened NM interval.       Interval History:   Since the initial office visit on 11/1/24, patient has not experienced any further episodes of passing out.  She has experienced some presyncopal feelings described as feeling flushed, hot and heart racing.  This caused her to present to the emergency room on April 29th.  Patient has followed up with Cardiology where workup so far has been unremarkable.  A tilt table test is pending to evaluate for POTS.      Patient continues to follow-up with Ortho concussion clinic and is receiving vision therapy.  However, she continues to experience postconcussion symptoms, mainly dizziness, blurry vision and difficulty concentrating.  Headaches also continue to occur about 3-4 times a month.  She was prescribed Imitrex which does help abort her headaches.  She is asking to have her FMLA updated to allow for 4 days of her month due to her above symptoms.     Patient's PCP feels her symptoms are related to anxiety and has prescribed Lexapro.  However, patient never started taking this medication and never took the hydroxyzine.  She does not feel like she is super anxious, but does acknowledge some anxiety relating to her continued presyncopal events.     24 y.o.  right handed female, with ADHD and anxiety disorder who is presenting to [facility] for follow-up of a possible seizure vs syncope vs POTS.  Neurological exam is normal.  Head CT was unremarkable. Given the prodromal feelings of vision closing in, dizziness, pale, and feeling like she was going to pass out, her recent event is most consistent with a syncopal episode, specifically convulsive syncope.  She should continue following up with Cardiology and complete the already ordered tilt table test to evaluate for POTS.  Will also order MRA of patient's head and neck to evaluate cerebral vasculature and rule out vertebrobasilar insufficiency due to patient's dizziness.  Note, she is unable to undergo CTA head and neck due to contrast dye allergy.  Consider obtaining EEG if tilt table test is nondiagnostic.   Orders:    Anti-thyroglobulin antibody; Future    T3; Future    Thyroid stimulating immunoglobulin; Future    Thyroglobulin w/ AB; Future    Anti-microsomal antibody; Future    RHEUMATOID FACTOR; Future    Cyclic citrul peptide antibody, IgG; Future    Sjogren's Antibodies; Future    Uric acid; Future    Lyme Total AB W Reflex to IGM/IGG; Future    C-reactive  protein; Future    Sedimentation rate, automated; Future    C3 complement; Future    C4 complement; Future    Complement, total; Future    Urinalysis with microscopic; Future    Protein / creatinine ratio, urine; Future    Histone Antibody; Future    Lupus anticoagulant; Future    Beta-2 glycoprotein antibodies; Future    Cardiolipin antibody; Future    Nuclear antigen antibody; Future    GATITO Screen w/Reflex Cascade; Future    Anti-DNA antibody, double-stranded; Future    Anti-U1 RNP Ab (RDL); Future    Coello; Anti-Coello; Future    SS-A; Ro Antibodies; Future    SS-B; La Antibodies; Future    Centromere Antibody; Future    Anti-scleroderma antibody; Future    Anti-Jaclyn 1 Antibody; Future    Chronic Hepatitis Panel; Future    CK (with reflex to MB); Future    Anti-neutrophilic cytoplasmic antibody; Future    Aldolase; Future    Parvovirus B19 antibody, IgG and IgM; Future    CBC and differential; Future    Comprehensive metabolic panel; Future    Iron Panel (Includes Ferritin, Iron Sat%, Iron, and TIBC); Future    Magnesium; Future    PTH, intact; Future    TSH, 3rd generation with Free T4 reflex; Future    Vitamin B12; Future    Vitamin D 25 hydroxy; Future    T4, free; Future

## 2025-06-17 NOTE — ASSESSMENT & PLAN NOTE
Orders:    Anti-thyroglobulin antibody; Future    T3; Future    Thyroid stimulating immunoglobulin; Future    Thyroglobulin w/ AB; Future    Anti-microsomal antibody; Future    RHEUMATOID FACTOR; Future    Cyclic citrul peptide antibody, IgG; Future    Sjogren's Antibodies; Future    Uric acid; Future    Lyme Total AB W Reflex to IGM/IGG; Future    C-reactive protein; Future    Sedimentation rate, automated; Future    C3 complement; Future    C4 complement; Future    Complement, total; Future    Urinalysis with microscopic; Future    Protein / creatinine ratio, urine; Future    Histone Antibody; Future    Lupus anticoagulant; Future    Beta-2 glycoprotein antibodies; Future    Cardiolipin antibody; Future    Nuclear antigen antibody; Future    GATITO Screen w/Reflex Cascade; Future    Anti-DNA antibody, double-stranded; Future    Anti-U1 RNP Ab (RDL); Future    Coello; Anti-Coello; Future    SS-A; Ro Antibodies; Future    SS-B; La Antibodies; Future    Centromere Antibody; Future    Anti-scleroderma antibody; Future    Anti-Jaclyn 1 Antibody; Future    Chronic Hepatitis Panel; Future    CK (with reflex to MB); Future    Anti-neutrophilic cytoplasmic antibody; Future    Aldolase; Future    Parvovirus B19 antibody, IgG and IgM; Future    CBC and differential; Future    Comprehensive metabolic panel; Future    Folate; Future    Iron Panel (Includes Ferritin, Iron Sat%, Iron, and TIBC); Future    Magnesium; Future    PTH, intact; Future    TSH, 3rd generation with Free T4 reflex; Future    T4, free; Future

## 2025-06-18 ENCOUNTER — TELEPHONE (OUTPATIENT)
Age: 25
End: 2025-06-18

## 2025-06-18 ENCOUNTER — OFFICE VISIT (OUTPATIENT)
Dept: FAMILY MEDICINE CLINIC | Facility: CLINIC | Age: 25
End: 2025-06-18
Payer: COMMERCIAL

## 2025-06-18 VITALS
SYSTOLIC BLOOD PRESSURE: 118 MMHG | HEIGHT: 66 IN | BODY MASS INDEX: 27.97 KG/M2 | DIASTOLIC BLOOD PRESSURE: 82 MMHG | TEMPERATURE: 97.9 F | RESPIRATION RATE: 16 BRPM | HEART RATE: 90 BPM | WEIGHT: 174 LBS | OXYGEN SATURATION: 99 %

## 2025-06-18 DIAGNOSIS — R94.6 ABNORMAL RESULTS OF THYROID FUNCTION STUDIES: ICD-10-CM

## 2025-06-18 DIAGNOSIS — F41.1 GAD (GENERALIZED ANXIETY DISORDER): Chronic | ICD-10-CM

## 2025-06-18 DIAGNOSIS — R00.0 SINUS TACHYCARDIA: Chronic | ICD-10-CM

## 2025-06-18 DIAGNOSIS — E55.9 VITAMIN D DEFICIENCY: ICD-10-CM

## 2025-06-18 DIAGNOSIS — F07.81 POST CONCUSSION SYNDROME: ICD-10-CM

## 2025-06-18 DIAGNOSIS — Z11.4 SCREENING FOR HIV (HUMAN IMMUNODEFICIENCY VIRUS): ICD-10-CM

## 2025-06-18 DIAGNOSIS — R73.01 IMPAIRED FASTING GLUCOSE: ICD-10-CM

## 2025-06-18 DIAGNOSIS — D50.8 IRON DEFICIENCY ANEMIA SECONDARY TO INADEQUATE DIETARY IRON INTAKE: ICD-10-CM

## 2025-06-18 DIAGNOSIS — E83.40 DISORDERS OF MAGNESIUM METABOLISM, UNSPECIFIED: ICD-10-CM

## 2025-06-18 DIAGNOSIS — R55 SYNCOPE, UNSPECIFIED SYNCOPE TYPE: ICD-10-CM

## 2025-06-18 DIAGNOSIS — G90.A POSTURAL ORTHOSTATIC TACHYCARDIA SYNDROME (POTS): ICD-10-CM

## 2025-06-18 DIAGNOSIS — R55 SYNCOPE AND COLLAPSE: Chronic | ICD-10-CM

## 2025-06-18 DIAGNOSIS — D51.8 VITAMIN B12 DEFICIENCY (DIETARY) ANEMIA: ICD-10-CM

## 2025-06-18 DIAGNOSIS — I10 PRIMARY HYPERTENSION: ICD-10-CM

## 2025-06-18 DIAGNOSIS — N25.81 SECONDARY HYPERPARATHYROIDISM, RENAL (HCC): ICD-10-CM

## 2025-06-18 DIAGNOSIS — Z11.59 NEED FOR HEPATITIS C SCREENING TEST: ICD-10-CM

## 2025-06-18 DIAGNOSIS — Z79.899 ON LONG TERM DRUG THERAPY: ICD-10-CM

## 2025-06-18 DIAGNOSIS — D52.8 OTHER FOLATE DEFICIENCY ANEMIAS: ICD-10-CM

## 2025-06-18 DIAGNOSIS — Z00.00 ANNUAL PHYSICAL EXAM: Primary | ICD-10-CM

## 2025-06-18 DIAGNOSIS — H83.01 LABYRINTHITIS OF RIGHT EAR: Chronic | ICD-10-CM

## 2025-06-18 DIAGNOSIS — E78.00 PURE HYPERCHOLESTEROLEMIA: ICD-10-CM

## 2025-06-18 DIAGNOSIS — G90.89: Chronic | ICD-10-CM

## 2025-06-18 DIAGNOSIS — F41.0 PANIC ATTACKS: Chronic | ICD-10-CM

## 2025-06-18 DIAGNOSIS — Z23 ENCOUNTER FOR IMMUNIZATION: ICD-10-CM

## 2025-06-18 DIAGNOSIS — R53.83 OTHER FATIGUE: Chronic | ICD-10-CM

## 2025-06-18 DIAGNOSIS — F90.9 ADULT ADHD: Chronic | ICD-10-CM

## 2025-06-18 DIAGNOSIS — G72.49 AUTOIMMUNE MYOPATHY: Chronic | ICD-10-CM

## 2025-06-18 PROCEDURE — 99395 PREV VISIT EST AGE 18-39: CPT | Performed by: NURSE PRACTITIONER

## 2025-06-18 RX ORDER — DEXTROAMPHETAMINE SACCHARATE, AMPHETAMINE ASPARTATE MONOHYDRATE, DEXTROAMPHETAMINE SULFATE AND AMPHETAMINE SULFATE 3.75; 3.75; 3.75; 3.75 MG/1; MG/1; MG/1; MG/1
15 CAPSULE, EXTENDED RELEASE ORAL EVERY MORNING
Qty: 30 CAPSULE | Refills: 0 | Status: SHIPPED | OUTPATIENT
Start: 2025-06-18

## 2025-06-18 NOTE — TELEPHONE ENCOUNTER
Pt called stating she needs a PA started for her Adderall. Please start and notify pt of status. Thanks

## 2025-06-18 NOTE — TELEPHONE ENCOUNTER
PA for (ADDERALL XR, 15MG,) SUBMITTED to [facility]    via    []CMM-KEY:   [x]Surescripts-Case ID # [id number]  []Availity-Auth ID # NDC #   []Faxed to plan   []Other website   []Phone call Case ID #     []PA sent as URGENT    All office notes, labs and other pertaining documents and studies sent. Clinical questions answered. Awaiting determination from insurance company.     Turnaround time for your insurance to make a decision on your Prior Authorization can take 7-21 business days.

## 2025-06-18 NOTE — TELEPHONE ENCOUNTER
PA for (ADDERALL XR, 15MG,) APPROVED     Date(s) approved June 18, 2025 to June 18, 2026     Case # [id number]     Patient advised by      - unable to leave message VM box not set up    [x]MyChart Message  []Phone call   []LMOM  []L/M to call office as no active Communication consent on file  []Unable to leave detailed message as VM not approved on Communication consent       Pharmacy advised by    [x]Fax  []Phone call  []Secure Chat    Specialty Pharmacy    []     Approval letter scanned into Media Yes

## 2025-06-18 NOTE — ASSESSMENT & PLAN NOTE
6/18/2025  Continue   Metoprolol succinate   Following with cardiology at this time   FMLA paperwork filled out       7/30/2025   Has TILT Test scheduled

## 2025-07-01 ENCOUNTER — OFFICE VISIT (OUTPATIENT)
Dept: URGENT CARE | Age: 25
End: 2025-07-01
Payer: COMMERCIAL

## 2025-07-01 ENCOUNTER — NURSE TRIAGE (OUTPATIENT)
Age: 25
End: 2025-07-01

## 2025-07-01 VITALS
BODY MASS INDEX: 27 KG/M2 | RESPIRATION RATE: 16 BRPM | HEART RATE: 87 BPM | DIASTOLIC BLOOD PRESSURE: 75 MMHG | HEIGHT: 66 IN | WEIGHT: 168 LBS | OXYGEN SATURATION: 98 % | TEMPERATURE: 97.1 F | SYSTOLIC BLOOD PRESSURE: 135 MMHG

## 2025-07-01 DIAGNOSIS — R39.9 UTI SYMPTOMS: Primary | ICD-10-CM

## 2025-07-01 LAB
SL AMB  POCT GLUCOSE, UA: NEGATIVE
SL AMB LEUKOCYTE ESTERASE,UA: ABNORMAL
SL AMB POCT BILIRUBIN,UA: NEGATIVE
SL AMB POCT BLOOD,UA: ABNORMAL
SL AMB POCT CLARITY,UA: ABNORMAL
SL AMB POCT COLOR,UA: YELLOW
SL AMB POCT KETONES,UA: NEGATIVE
SL AMB POCT NITRITE,UA: NEGATIVE
SL AMB POCT PH,UA: 6
SL AMB POCT SPECIFIC GRAVITY,UA: 1.03
SL AMB POCT URINE PROTEIN: NEGATIVE
SL AMB POCT UROBILINOGEN: 0.2

## 2025-07-01 PROCEDURE — 87086 URINE CULTURE/COLONY COUNT: CPT

## 2025-07-01 PROCEDURE — S9083 URGENT CARE CENTER GLOBAL: HCPCS

## 2025-07-01 PROCEDURE — 87077 CULTURE AEROBIC IDENTIFY: CPT

## 2025-07-01 PROCEDURE — G0381 LEV 2 HOSP TYPE B ED VISIT: HCPCS

## 2025-07-01 PROCEDURE — 81002 URINALYSIS NONAUTO W/O SCOPE: CPT

## 2025-07-01 RX ORDER — NITROFURANTOIN 25; 75 MG/1; MG/1
100 CAPSULE ORAL 2 TIMES DAILY
Qty: 10 CAPSULE | Refills: 0 | Status: SHIPPED | OUTPATIENT
Start: 2025-07-01 | End: 2025-07-06

## 2025-07-01 RX ORDER — ONDANSETRON 4 MG/1
4 TABLET, ORALLY DISINTEGRATING ORAL EVERY 8 HOURS PRN
Qty: 21 TABLET | Refills: 0 | Status: SHIPPED | OUTPATIENT
Start: 2025-07-01 | End: 2025-07-08

## 2025-07-01 NOTE — TELEPHONE ENCOUNTER
"REASON FOR CONVERSATION: Possible UTI    SYMPTOMS: vaginal bleeding/ possibly blood from urethra, possibly pregnant, cramping     OTHER HEALTH INFORMATION: Pt calling in saying that she's having cramping and when wiping pink blood. Pt is unsure if its vaginal bleeding or urethra. Pt is saying she's taking the birth control pills in the morning and at night, as pt isnt taking the pills consistently at the same time everyday. Pt c/o urinating more frequently and and urine color is darker. Cramping pain in lower abdomen 4/10 \"feels like I have period cramps\".     PROTOCOL DISPOSITION: go to urgent care now     CARE ADVICE PROVIDED: Pt is provided care advice, and is notified when to call back, pt verbalized understanding.      PRACTICE FOLLOW-UP: n/a         Reason for Disposition   Urinating more frequently than usual (i.e., frequency) OR new-onset of the feeling of an urgent need to urinate (i.e., urgency)    Answer Assessment - Initial Assessment Questions  1. SYMPTOM: \"What's the main symptom you're concerned about?\" (e.g., frequency, incontinence)      Urinating more frequently and darker in color   2. ONSET: \"When did the  symptoms  start?\"      3 days ago   3. PAIN: \"Is there any pain?\" If Yes, ask: \"How bad is it?\" (Scale: 1-10; mild, moderate, severe)      Cramping pain in lower abdomen 4/10 \"feels like I have period cramps\".   4. CAUSE: \"What do you think is causing the symptoms?\"      Pt denies   5. OTHER SYMPTOMS: \"Do you have any other symptoms?\" (e.g., blood in urine, fever, flank pain, pain with urination)      Pt is unsure of blood in urine, flank pain, pain with urination   6. PREGNANCY: \"Is there any chance you are pregnant?\" \"When was your last menstrual period?\"        Pt reporting possibility of pregnancy, pt is advised to take a pregnancy test and to call back if positive, pt verbalized understanding. LMP 6/6/25.    Protocols used: Urinary Symptoms-Adult-[place]    "

## 2025-07-01 NOTE — PROGRESS NOTES
[facility]  Name: Sheila Oakes      : 2000      MRN: [id number]  Encounter Provider: PABLITO Goldberg  Encounter Date: 2025   Encounter department: [facility] [place]  :  Assessment & Plan  UTI symptoms    Orders:    POCT urine dip    Urine culture; Future    nitrofurantoin (MACROBID) 100 mg capsule; Take 1 capsule (100 mg total) by mouth 2 (two) times a day for 5 days    ondansetron (ZOFRAN-ODT) 4 mg disintegrating tablet; Take 1 tablet (4 mg total) by mouth every 8 (eight) hours as needed for nausea for up to 7 days        Patient Instructions  Follow up with PCP in 3-5 days.  Proceed to  ER if symptoms worsen.    If tests are performed, our office will contact you with results only if changes need to made to the care plan discussed with you at the visit. [facility]    Chief Complaint:   Chief Complaint   Patient presents with    Possible UTI     Patient reports frequency of urination. Patient reports that she had bleeding with morning urination, none since. Patient denies burning with urination. This began this a.m.     History of Present Illness   UTI symptoms. Hx kidney stones. Dip notes leuks/blood          Review of Systems   Constitutional:  Negative for chills and fever.   HENT:  Negative for congestion, postnasal drip and sore throat.    Respiratory:  Negative for cough, shortness of breath and wheezing.    Cardiovascular:  Negative for chest pain.   Gastrointestinal:  Positive for abdominal pain (suprapubic pain). Negative for abdominal distention, nausea and vomiting.   Genitourinary:  Positive for dysuria and frequency. Negative for flank pain and hematuria.   Musculoskeletal:  Negative for back pain.   Neurological:  Negative for dizziness, syncope, light-headedness and headaches.   Psychiatric/Behavioral:  Negative for agitation and confusion.    All other systems reviewed and are negative.    Past Medical History  "  Past Medical History[1]  Past Surgical History[2]  Family History[3]  she reports that she has never smoked. She has never been exposed to tobacco smoke. She has never used smokeless tobacco. She reports current alcohol use. She reports that she does not use drugs.  Current Outpatient Medications   Medication Instructions    albuterol (ProAir HFA) 90 mcg/act inhaler 2 puffs, Inhalation, Every 4 hours PRN    amphetamine-dextroamphetamine (ADDERALL XR, 15MG,) 15 MG 24 hr capsule 15 mg, Oral, Every morning    meclizine (ANTIVERT) 25 mg, Oral, 3 times daily PRN    metoprolol succinate (TOPROL-XL) 25 mg, Daily    nitrofurantoin (MACROBID) 100 mg, Oral, 2 times daily    norgestimate-ethinyl estradiol (ORTHO TRI-CYCLEN,TRINESSA) 0.18/0.215/0.25 MG-35 MCG per tablet 1 tablet, Oral, Daily    ondansetron (ZOFRAN-ODT) 4 mg, Oral, Every 8 hours PRN    SUMAtriptan (IMITREX) 25 mg, Oral, Once as needed   Allergies[4]     Objective   /75   Pulse 87   Temp (!) 97.1 °F (36.2 °C)   Resp 16   Ht 5' 6\" (1.676 m)   Wt 76.2 kg (168 lb)   SpO2 98%   BMI 27.12 kg/m²      Physical Exam  Vitals reviewed.   Constitutional:       Appearance: Normal appearance. She is normal weight.   Abdominal:      Tenderness: There is no right CVA tenderness or left CVA tenderness.     Neurological:      Mental Status: She is alert.         Portions of the record may have been created with voice recognition software.  Occasional wrong word or \"sound a like\" substitutions may have occurred due to the inherent limitations of voice recognition software.  Read the chart carefully and recognize, using context, where substitutions have occurred.       [1]   Past Medical History:  Diagnosis Date    Anxiety     Bilateral ovarian cysts     Kidney stones    [2]   Past Surgical History:  Procedure Laterality Date    ADENOIDECTOMY      TONSILECTOMY AND ADNOIDECTOMY      TONSILLECTOMY      TYMPANOSTOMY      TYMPANOSTOMY TUBE PLACEMENT      6 sets - last " time as a child    WISDOM TOOTH EXTRACTION     [3]   Family History  Problem Relation Name Age of Onset    Fibroids Mother      GI problems Father      Seizures Sister Virginie     Arthritis Maternal Grandmother     [4]   Allergies  Allergen Reactions    Mupirocin      tongue swollen    Gramineae Pollens Itching     [place] blue[place] pollen extract    Iv Contrast [Iodinated Contrast Media] Hives     Patient developed a few hives on the face/neck after IV contrast. No other associated symptoms.     Sulfa Antibiotics Rash

## 2025-07-02 NOTE — TELEPHONE ENCOUNTER
Patient calling regarding spotting. States yesterday it only happened once. Was seen in urgent care an Amy showed blood and leuks in urine. Culture pending. Currently taking macrobid rx'd by urgent care. Patient took UPT yesterday and it was negative.     Advised to monitor spotting. If it becomes persistent or tests positive for pregnancy should return call. Patient verbalized understanding.

## 2025-07-03 LAB
BACTERIA UR CULT: ABNORMAL
BACTERIA UR CULT: ABNORMAL

## 2025-07-07 LAB
BACTERIA UR CULT: ABNORMAL
BACTERIA UR CULT: ABNORMAL

## 2025-07-16 ENCOUNTER — HOSPITAL ENCOUNTER (OUTPATIENT)
Dept: SLEEP CENTER | Facility: HOSPITAL | Age: 25
Discharge: HOME | End: 2025-07-16
Payer: COMMERCIAL

## 2025-07-16 DIAGNOSIS — R06.83 SNORING: ICD-10-CM

## 2025-07-16 PROCEDURE — G0399 HOME SLEEP TEST/TYPE 3 PORTA: HCPCS

## 2025-07-16 NOTE — PROGRESS NOTES
Home Sleep Study Documentation    HOME STUDY DEVICE: Noxturnal no                                           Purvi G3 yes device # 23      Pre-Sleep Home Study:    Set-up and instructions performed by: Jes    Technician performed demonstration for Patient: yes    Return demonstration performed by Patient: yes    Written instructions provided to Patient: yes    Patient signed consent form: yes          Post-Sleep Home Study:    Post Test Questionnaire Data: Pending    Additional comments by Patient: pending    Home Sleep Study Failed:pending    Failure reason: pending    Reported or Detected: pending    Scored by: pending

## 2025-07-25 ENCOUNTER — ANNUAL EXAM (OUTPATIENT)
Dept: OBGYN CLINIC | Facility: CLINIC | Age: 25
End: 2025-07-25
Payer: COMMERCIAL

## 2025-07-25 VITALS
BODY MASS INDEX: 26.03 KG/M2 | DIASTOLIC BLOOD PRESSURE: 64 MMHG | SYSTOLIC BLOOD PRESSURE: 116 MMHG | WEIGHT: 162 LBS | HEIGHT: 66 IN

## 2025-07-25 DIAGNOSIS — Z01.419 WELL WOMAN EXAM WITH ROUTINE GYNECOLOGICAL EXAM: Primary | ICD-10-CM

## 2025-07-25 DIAGNOSIS — G43.109 MIGRAINE WITH AURA AND WITHOUT STATUS MIGRAINOSUS, NOT INTRACTABLE: ICD-10-CM

## 2025-07-25 DIAGNOSIS — Z30.09 BIRTH CONTROL COUNSELING: ICD-10-CM

## 2025-07-25 PROBLEM — R06.83 SNORING: Status: ACTIVE | Noted: 2025-07-25

## 2025-07-25 PROBLEM — Z91.89 AT RISK FOR SLEEP APNEA: Status: ACTIVE | Noted: 2025-07-25

## 2025-07-25 PROBLEM — G47.33 OSA (OBSTRUCTIVE SLEEP APNEA): Status: ACTIVE | Noted: 2025-07-25

## 2025-07-25 PROCEDURE — S0612 ANNUAL GYNECOLOGICAL EXAMINA: HCPCS | Performed by: PHYSICIAN ASSISTANT

## 2025-07-25 PROCEDURE — 95806 SLEEP STUDY UNATT&RESP EFFT: CPT | Performed by: STUDENT IN AN ORGANIZED HEALTH CARE EDUCATION/TRAINING PROGRAM

## 2025-07-25 RX ORDER — ACETAMINOPHEN AND CODEINE PHOSPHATE 120; 12 MG/5ML; MG/5ML
1 SOLUTION ORAL DAILY
Qty: 84 TABLET | Refills: 3 | Status: SHIPPED | OUTPATIENT
Start: 2025-07-25

## 2025-07-25 NOTE — PROGRESS NOTES
Name: Sheila Oakes      : 2000      MRN: [id number]  Encounter Provider: Gena Shelton PA-C  Encounter Date: 2025   Encounter department: [facility] [place]  :  Assessment & Plan  Well woman exam with routine gynecological exam  Pap up to date    Perineal hygiene reviewed. Weight bearing exercises minium of 150 mins/weekly advised. Kegel exercises recommended.   SBE encouraged, A yearly mammogram is recommended for breast cancer screening starting at age 40. ASCCP guidelines reviewed. Condoms encouraged with all sexual activity to prevent STI's. Gardisil vaccines recommended up to age 45. Calcium/ Vit D dietary requirements discussed.   Advised to call with any issues, all concerns & questions addressed.   See provided information in your after visit summary     F/U Annually and PRN    Results will be released to [facility], if abnormal will call or message to review and discuss treatment plan.        Migraine with aura and without status migrainosus, not intractable  We discussed transition from combined OCP to POP due to new diagnosis of HTN and migraine with aura given increased risk for cardiovascular events and stroke   Pt agreeable to plan. Rx sent   Can consider transition to Slynd if BTB on Susan   Orders:    norethindrone (Susan) 0.35 MG tablet; Take 1 tablet (0.35 mg total) by mouth daily    Birth control counseling    Orders:    norethindrone (Susan) 0.35 MG tablet; Take 1 tablet (0.35 mg total) by mouth daily        History of Present Illness   HPI    Subjective     Sheila Oakes is a 24 y.o.  presenting for annual exam. She is without complaint and does not want STD testing today.     Pt with new development of HTN within the past year. She has been started on Toprol daily. She has also been following with neurology for post-concussive syndrome - next step is testing for POTS with tilt test. She is having more frequent HA with visual  changes     We discussed transition from combined OCP to POP due to new diagnosis of HTN and migraine with aura given increased risk for cardiovascular events and stroke     SCREENING  Last Pap: 2024 negative       GYN  The patient's menstrual history is as follows:    ;  ; Period Cycle (Days): 28; Period Duration (Days): 5; Period Pattern: Regular; Menstrual Flow: Moderate; Dysmenorrhea: (!) Mild; Dysmenorrhea Symptoms: Cramping    Sexually active: Yes - single partner - male  Contraception: OCPs    Hx Abnormal pap: denies  We reviewed ASCCP guidelines for Pap testing today.    Denies vaginal discharge, itching, odor, dyspareunia, pelvic pain and vulvar/vaginal symptoms      OB           Complaints: denies   Denies urgency, frequency, hematuria, leakage / change in stream, difficulty urinating.       BREAST  Complaints: denies   Denies: breast lump, breast tenderness, nipple discharge, skin color change, and skin lesion(s)      Pertinent Family Hx:   Family hx of breast cancer: no  Family hx of ovarian cancer: no  Family hx of colon cancer: no      GENERAL  PMH reviewed/updated and is as below.   Patient does follow with a PCP.    SOCIAL  Smoking: no  Alcohol:social  Drug: no  Occupation: [employer]       Past Medical History[1]    Past Surgical History[2]    Current Medications[3]    Allergies[4]    Social History     Socioeconomic History    Marital status: Single     Spouse name: Not on file    Number of children: Not on file    Years of education: Not on file    Highest education level: Not on file   Occupational History    Not on file   Tobacco Use    Smoking status: Never     Passive exposure: Never    Smokeless tobacco: Never   Vaping Use    Vaping status: Never Used   Substance and Sexual Activity    Alcohol use: Yes     Comment: socially     Drug use: No    Sexual activity: Yes     Partners: Male     Birth control/protection: OCP   Other Topics Concern    Not on file   Social  "History Narrative    Not on file     Social Drivers of Health     Financial Resource Strain: Not on file   Food Insecurity: No Food Insecurity (6/17/2025)    Nursing - Inadequate Food Risk Classification     Worried About Running Out of Food in the Last Year: Never true     Ran Out of Food in the Last Year: Never true     Ran Out of Food in the Last Year: Not on file   Transportation Needs: No Transportation Needs (6/17/2025)    PRAPARE - Transportation     Lack of Transportation (Medical): No     Lack of Transportation (Non-Medical): No   Physical Activity: Not on file   Stress: Not on file   Social Connections: Not on file   Intimate Partner Violence: Not on file   Housing Stability: Low Risk  (6/17/2025)    Housing Stability Vital Sign     Unable to Pay for Housing in the Last Year: No     Number of Times Moved in the Last Year: 0     Homeless in the Last Year: No         Review of Systems   Constitutional: Negative.    Respiratory: Negative.     Cardiovascular: Negative.    Gastrointestinal: Negative.    Genitourinary: Negative.    Musculoskeletal: Negative.    Skin: Negative.    Psychiatric/Behavioral: Negative.            Objective   /64 (BP Location: Left arm, Patient Position: Sitting, Cuff Size: Standard)   Ht 5' 6\" (1.676 m)   Wt 73.5 kg (162 lb)   LMP 07/11/2025 (Approximate)   BMI 26.15 kg/m²      Physical Exam  Constitutional:       Appearance: Normal appearance.   Genitourinary:      Urethral meatus normal.      No lesions in the vagina.      Right Labia: No rash, tenderness, lesions or skin changes.     Left Labia: No tenderness, lesions, skin changes or rash.     No inguinal adenopathy present in the right or left side.     No vaginal discharge, tenderness or bleeding.      No vaginal prolapse present.       Right Adnexa: not tender, not full and no mass present.     Left Adnexa: not tender and not full.     No cervical motion tenderness, friability, lesion, polyp or eversion.      Uterus " is not enlarged or tender.      No uterine mass detected.  Breasts:     Breasts are symmetrical.      Right: No mass, nipple discharge, skin change or tenderness.      Left: No mass, nipple discharge, skin change or tenderness.   HENT:      Head: Normocephalic and atraumatic.   Neck:      Thyroid: No thyroid mass or thyromegaly.   Pulmonary:      Effort: Pulmonary effort is normal.   Abdominal:      General: Abdomen is flat.      Hernia: There is no hernia in the left inguinal area or right inguinal area.     Musculoskeletal:      Cervical back: Neck supple.      Right lower leg: No edema.      Left lower leg: No edema.   Lymphadenopathy:      Cervical: No cervical adenopathy.      Upper Body:      Right upper body: No supraclavicular or axillary adenopathy.      Left upper body: No supraclavicular or axillary adenopathy.      Lower Body: No right inguinal adenopathy. No left inguinal adenopathy.     Neurological:      General: No focal deficit present.      Mental Status: She is alert. Mental status is at baseline.     Skin:     General: Skin is warm and dry.     Psychiatric:         Mood and Affect: Mood normal.         Behavior: Behavior normal.         Thought Content: Thought content normal.         Judgment: Judgment normal.   Vitals reviewed.                [1]   Past Medical History:  Diagnosis Date    Anxiety     Bilateral ovarian cysts     Kidney stones    [2]   Past Surgical History:  Procedure Laterality Date    ADENOIDECTOMY      TONSILECTOMY AND ADNOIDECTOMY      TONSILLECTOMY      TYMPANOSTOMY      TYMPANOSTOMY TUBE PLACEMENT      6 sets - last time as a child    WISDOM TOOTH EXTRACTION     [3]   Current Outpatient Medications:     albuterol (ProAir HFA) 90 mcg/act inhaler, Inhale 2 puffs every 4 (four) hours as needed for wheezing or shortness of breath, Disp: 8.5 g, Rfl: 1    amphetamine-dextroamphetamine (ADDERALL XR, 15MG,) 15 MG 24 hr capsule, Take 1 capsule (15 mg total) by mouth every morning  Max Daily Amount: 15 mg, Disp: 30 capsule, Rfl: 0    meclizine (ANTIVERT) 25 mg tablet, Take 1 tablet (25 mg total) by mouth 3 (three) times a day as needed for dizziness, Disp: 30 tablet, Rfl: 0    metoprolol succinate (TOPROL-XL) 25 mg 24 hr tablet, Take 25 mg by mouth daily, Disp: , Rfl:     norethindrone (Susan) 0.35 MG tablet, Take 1 tablet (0.35 mg total) by mouth daily, Disp: 84 tablet, Rfl: 3    SUMAtriptan (Imitrex) 25 mg tablet, Take 1 tablet (25 mg total) by mouth once as needed for migraine, Disp: 10 tablet, Rfl: 1    ondansetron (ZOFRAN-ODT) 4 mg disintegrating tablet, Take 1 tablet (4 mg total) by mouth every 8 (eight) hours as needed for nausea for up to 7 days, Disp: 21 tablet, Rfl: 0  [4]   Allergies  Allergen Reactions    Mupirocin      tongue swollen    Gramineae Pollens Itching     [facility] blue[facility] pollen extract    Iv Contrast [Iodinated Contrast Media] Hives     Patient developed a few hives on the face/neck after IV contrast. No other associated symptoms.     Sulfa Antibiotics Rash

## 2025-07-30 ENCOUNTER — HOSPITAL ENCOUNTER (OUTPATIENT)
Dept: NON INVASIVE DIAGNOSTICS | Facility: HOSPITAL | Age: 25
Discharge: HOME | End: 2025-07-30
Payer: COMMERCIAL

## 2025-07-30 DIAGNOSIS — R55 SYNCOPE, UNSPECIFIED SYNCOPE TYPE: ICD-10-CM

## 2025-07-30 PROCEDURE — 93660 TILT TABLE EVALUATION: CPT | Performed by: INTERNAL MEDICINE

## 2025-07-30 PROCEDURE — 93660 TILT TABLE EVALUATION: CPT

## 2025-08-05 LAB
MAX DIASTOLIC BP: 86 MMHG
MAX PREDICTED HEART RATE: 195 BPM
PROTOCOL NAME: NORMAL
REASON FOR TERMINATION: NORMAL
STRESS POST EXERCISE DUR MIN: 33 MIN
STRESS POST EXERCISE DUR SEC: 23 SEC
STRESS POST PEAK HR: 130 BPM
STRESS POST PEAK SYSTOLIC BP: 158 MMHG
TARGET HR FORMULA: NORMAL
TEST INDICATION: NORMAL

## 2025-08-22 ENCOUNTER — NURSE TRIAGE (OUTPATIENT)
Age: 25
End: 2025-08-22